# Patient Record
Sex: FEMALE | Race: WHITE | URBAN - METROPOLITAN AREA
[De-identification: names, ages, dates, MRNs, and addresses within clinical notes are randomized per-mention and may not be internally consistent; named-entity substitution may affect disease eponyms.]

---

## 2017-01-30 ENCOUNTER — OFFICE VISIT (OUTPATIENT)
Dept: NEUROLOGY | Facility: CLINIC | Age: 57
End: 2017-01-30
Payer: COMMERCIAL

## 2017-01-30 VITALS
BODY MASS INDEX: 22.65 KG/M2 | HEART RATE: 66 BPM | TEMPERATURE: 97.6 F | DIASTOLIC BLOOD PRESSURE: 89 MMHG | SYSTOLIC BLOOD PRESSURE: 132 MMHG | WEIGHT: 119.8 LBS

## 2017-01-30 DIAGNOSIS — F03.90 DEMENTIA WITHOUT BEHAVIORAL DISTURBANCE, UNSPECIFIED DEMENTIA TYPE: Primary | ICD-10-CM

## 2017-01-30 DIAGNOSIS — F03.90 RAPIDLY PROGRESSIVE DEMENTIA (H): ICD-10-CM

## 2017-01-30 LAB
ANION GAP SERPL CALCULATED.3IONS-SCNC: 7 MMOL/L (ref 3–14)
BUN SERPL-MCNC: 16 MG/DL (ref 7–30)
CALCIUM SERPL-MCNC: 9.7 MG/DL (ref 8.5–10.1)
CHLORIDE SERPL-SCNC: 104 MMOL/L (ref 94–109)
CO2 SERPL-SCNC: 30 MMOL/L (ref 20–32)
CREAT SERPL-MCNC: 0.76 MG/DL (ref 0.52–1.04)
GFR SERPL CREATININE-BSD FRML MDRD: 79 ML/MIN/1.7M2
GLUCOSE SERPL-MCNC: 107 MG/DL (ref 70–99)
POTASSIUM SERPL-SCNC: 4.1 MMOL/L (ref 3.4–5.3)
SODIUM SERPL-SCNC: 141 MMOL/L (ref 133–144)
T3FREE SERPL-MCNC: 2.6 PG/ML (ref 2.3–4.2)
T4 FREE SERPL-MCNC: 0.93 NG/DL (ref 0.76–1.46)
TSH SERPL DL<=0.05 MIU/L-ACNC: 1.13 MU/L (ref 0.4–4)

## 2017-01-30 PROCEDURE — 84443 ASSAY THYROID STIM HORMONE: CPT | Performed by: PSYCHIATRY & NEUROLOGY

## 2017-01-30 PROCEDURE — 80048 BASIC METABOLIC PNL TOTAL CA: CPT | Performed by: PSYCHIATRY & NEUROLOGY

## 2017-01-30 PROCEDURE — 84439 ASSAY OF FREE THYROXINE: CPT | Performed by: PSYCHIATRY & NEUROLOGY

## 2017-01-30 PROCEDURE — 84481 FREE ASSAY (FT-3): CPT | Performed by: PSYCHIATRY & NEUROLOGY

## 2017-01-30 PROCEDURE — 99215 OFFICE O/P EST HI 40 MIN: CPT | Performed by: PSYCHIATRY & NEUROLOGY

## 2017-01-30 PROCEDURE — 36415 COLL VENOUS BLD VENIPUNCTURE: CPT | Performed by: PSYCHIATRY & NEUROLOGY

## 2017-01-30 RX ORDER — DONEPEZIL HYDROCHLORIDE 10 MG/1
10 TABLET, FILM COATED ORAL AT BEDTIME
Qty: 30 TABLET | Refills: 11 | Status: SHIPPED | OUTPATIENT
Start: 2017-01-30 | End: 2017-07-06

## 2017-01-30 RX ORDER — MEMANTINE HYDROCHLORIDE 5 MG/1
TABLET ORAL
Qty: 120 TABLET | Refills: 11 | Status: ON HOLD | OUTPATIENT
Start: 2017-01-30 | End: 2017-07-18

## 2017-01-30 NOTE — MR AVS SNAPSHOT
"              After Visit Summary   1/30/2017    Judith Sifuentes    MRN: [id number]           Patient Information     Date Of Birth          1960        Visit Information        Provider Department      1/30/2017 9:30 AM Esme Russell MD [facility]        Today's Diagnoses     Dementia without behavioral disturbance, unspecified dementia type    -  1     Rapidly progressive dementia           Care Instructions    Plan:    Continue the Aricept (donepezil) 10mg at bedtime.  Increase the Namenda to 5mg in the morning and 10mg at bedtime for one week, then 10mg twice daily.   Labs today. We will notify you of results.   Keep up the good work with staying active!  Return to clinic in 3 months. We will discuss the shaking/twitching further then, if it continues.        Follow-ups after your visit        Follow-up notes from your care team     Return in about 3 months (around 4/30/2017).      Who to contact     If you have questions or need follow up information about today's clinic visit or your schedule please contact [facility] directly at [phone].  Normal or non-critical lab and imaging results will be communicated to you by [employer], letter or phone within 4 business days after the clinic has received the results. If you do not hear from us within 7 days, please contact the clinic through [employer] or phone. If you have a critical or abnormal lab result, we will notify you by phone as soon as possible.  Submit refill requests through [employer] or call your pharmacy and they will forward the refill request to us. Please allow 3 business days for your refill to be completed.          Additional Information About Your Visit        MyChart Information     [employer]ola lets you send messages to your doctor, view your test results, renew your prescriptions, schedule appointments and more. To sign up, go to [email or web address] . Click on \"Log in\" on the left side of the screen, " "which will take you to the Welcome page. Then click on \"Sign up Now\" on the right side of the page.     You will be asked to enter the access code listed below, as well as some personal information. Please follow the directions to create your username and password.     Your access code is: [email or web address]  Expires: 3/27/2017  7:43 AM     Your access code will  in 90 days. If you need help or a new code, please call your [facility] or [phone].        Care EveryWhere ID     This is your Care EveryWhere ID. This could be used by other organizations to access your [place] medical records  [id number]        Your Vitals Were     Pulse Temperature Last Period             66 97.6  F (36.4  C) (Oral) 2013          Blood Pressure from Last 3 Encounters:   17 132/89   16 132/75   16 122/69    Weight from Last 3 Encounters:   17 119 lb 12.8 oz (54.341 kg)   16 117 lb (53.071 kg)   16 113 lb (51.256 kg)              We Performed the Following     Basic metabolic panel     T3, Free     T4, free     TSH          Today's Medication Changes          These changes are accurate as of: 17 10:07 AM.  If you have any questions, ask your nurse or doctor.               These medicines have changed or have updated prescriptions.        Dose/Directions    memantine 5 MG tablet   Commonly known as:  NAMENDA   This may have changed:  additional instructions   Used for:  Dementia without behavioral disturbance, unspecified dementia type   Changed by:  Esme Russell MD        Take 1 tab in the morning and 2 tabs (10mg) at night for one week and then increase to 2 tabs (10mg ) twice daily.   Quantity:  120 tablet   Refills:  11            Where to get your medicines      [facility] [place]  [address] [address]  [address] [facility] [address]     Phone:  [phone]    - donepezil 10 MG tablet  - memantine 5 MG tablet       "       Primary Care Provider Office Phone # Fax #    Erika Davey [phone]       [facility] [address] [address]        Thank you!     Thank you for choosing [facility]  for your care. Our goal is always to provide you with excellent care. Hearing back from our patients is one way we can continue to improve our services. Please take a few minutes to complete the written survey that you may receive in the mail after your visit with us. Thank you!             Your Updated Medication List - Protect others around you: Learn how to safely use, store and throw away your medicines at www.disposemymeds.org.          This list is accurate as of: 1/30/17 10:07 AM.  Always use your most recent med list.                   Brand Name Dispense Instructions for use    donepezil 10 MG tablet    ARICEPT    30 tablet    Take 1 tablet (10 mg) by mouth At Bedtime       memantine 5 MG tablet    NAMENDA    120 tablet    Take 1 tab in the morning and 2 tabs (10mg) at night for one week and then increase to 2 tabs (10mg ) twice daily.

## 2017-01-30 NOTE — PATIENT INSTRUCTIONS
Plan:    Continue the Aricept (donepezil) 10mg at bedtime.  Increase the Namenda to 5mg in the morning and 10mg at bedtime for one week, then 10mg twice daily.   Labs today. We will notify you of results.   Keep up the good work with staying active!  Referral to care coordinator.  Return to clinic in 3 months. We will discuss the shaking/twitching further then, if it continues.

## 2017-01-30 NOTE — NURSING NOTE
"Chief Complaint   Patient presents with     RECHECK     Cognitive decline.       Initial /89 mmHg  Pulse 66  Temp(Src) 97.6  F (36.4  C) (Oral)  Wt 119 lb 12.8 oz (54.341 kg)  LMP 09/18/2013 Estimated body mass index is 22.65 kg/(m^2) as calculated from the following:    Height as of 12/27/16: 5' 1\" (1.549 m).    Weight as of this encounter: 119 lb 12.8 oz (54.341 kg).  BP completed using cuff size: regular    Patient prefers to be contacted: [phone] and letter.  Okay to leave detailed message on voicemail: yes    Madeleine Pressley NR-CMA      "

## 2017-01-30 NOTE — PROGRESS NOTES
ESTABLISHED PATIENT NEUROLOGY NOTE    DATE OF VISIT: 1/30/2017  MRN: [id number]  PATIENT NAME: Judith Sifuentes  YOB: 1960    Chief Complaint   Patient presents with     RECHECK     Cognitive decline.     SUBJECTIVE:                                                      HISTORY OF PRESENT ILLNESS:  Judith is here for follow up regarding cognitive decline. The patient is accompanied by her  in clinic today. He is her primary caregiver. The only new concerns they express today is occasional twitching (vs tremor?) in the hands. This comes and goes without warning and can sometimes get int he way of fine motor tasks such as eating. The patient's daughter helps to care for her mother and her son will also be moving back into the house for additional support. Her  says that she takes care of her own ADLs without help. There are not currently any safety concerns. She does occasionally get frustrated, but otherwise no behavioral changes to report. She likes to go shopping with her friend and also continues to be social with [employer] functions. No change in her mobility. She is tolerating the Aricept and Namenda, denies side effects. The patient feels that maybe her memory is a little better on the medications. They do endorse good days and bad days. She has occasional bouts of confusion. No safety concerns at home. The patient's  says he has been doing some future planning in terms of welfare options, but he has not been in touch with a /care coordinator.     Regarding follow-up for the abnormal thyroid antibody testing, the patient's appointment with endocrinology was apparently cancelled and then not rescheduled. The patient's primary care provider felt that the results were a false positive. No further testing has been completed. I am reminded that the patient had abnormal CSF studies which we have not been able to explain. The family opted not to have the studies  redone.     CURRENT MEDICATIONS:     No current outpatient prescriptions on file prior to visit.  No current facility-administered medications on file prior to visit.    RECENT DIAGNOSTIC STUDIES:   Labs:   Results for orders placed or performed in visit on 07/14/16   T4, free   Result Value Ref Range    T4 Free 0.85 0.76 - 1.46 ng/dL   TSH   Result Value Ref Range    TSH 1.50 0.40 - 4.00 mU/L   T3, Free   Result Value Ref Range    Free T3 2.2 (L) 2.3 - 4.2 pg/mL       REVIEW OF SYSTEMS:                                                      10-point review of systems is negative except as mentioned above in HPI.     EXAM:                                                      Physical Exam:   Vitals: /89 mmHg  Pulse 66  Temp(Src) 97.6  F (36.4  C) (Oral)  Wt 119 lb 12.8 oz (54.341 kg)  LMP 09/18/2013  BMI= Body mass index is 22.65 kg/(m^2).  GENERAL: NAD. Appears older than stated age.  Neurologic:  MENTAL STATUS: Alert, attentive. Speech is fluent. Poor comprehension. Requires a lot of cues.  MoCA: 6/30 (normal is 26 and above. Prior score 10/30).   CRANIAL NERVES: Pupils equally, round and reactive to light. Facial movement normal. EOM full. Hearing to conversation. Trapezius strength intact. Palate moves symmetrically. Tongue midline.  MOTOR: 5/5 in proximal and distal muscle groups of upper and lower extremities. Tone and bulk normal. No abnormal movements observed.  SENSATION: Normal light touch throughout.  COORDINATION: Normal finger nose finger. Normal hand opening/closing speed and amplitude.  STATION AND GAIT: Posture is stooped. Romberg negative. Gait is cautious, but otherwise appears normal.  CV: RRR. S1, S2.       ASSESSMENT and PLAN:                                                      Assessment and Plan:    ICD-10-CM    1. Dementia without behavioral disturbance, unspecified dementia type F03.90 memantine (NAMENDA) 5 MG tablet     Basic metabolic panel     TSH     T4, free     T3, Free   2.  Rapidly progressive dementia F03.90 donepezil (ARICEPT) 10 MG tablet       Ms. Denys Sifuentes is a pleasant 55 yo woman with somewhat rapid cognitive decline (over 3 years time). We have not yet found a good explanation for the speed of her decline, though it is possible that she has a rapidly progressive form of early-onset Alzheimer's. Subjectively, the patient and her  feel that she is stable. She feels that the memory is slightly better on the Aricept and Namenda. Objectively, she continues to decline clinically. We discussed her current needs and future planning. The patient's  is the primary caregiver, and he is interested in community resources. I will place a care coordination referral to get things started. We also discussed increasing the Namenda to full dose of 10mg BID since Judith is tolerating the medication well. I would like to recheck some labs today too, with the new history of twitching/shaking. The patient's  understands and agrees with the plan.     Patient Instructions:  Continue the Aricept (donepezil) 10mg at bedtime.  Increase the Namenda to 5mg in the morning and 10mg at bedtime for one week, then 10mg twice daily.   Labs today. We will notify you of results.   Keep up the good work with staying active!  Referral to care coordinator.  Return to clinic in 3 months. We will discuss the shaking/twitching further then, if it continues.      Total Time: 40 minutes were spent with the patient. More than 50% of the time spent on counseling (as described above in Assessment and Plan) /coordinating the care.    Esme Russell MD  Neurology

## 2017-01-31 NOTE — PROGRESS NOTES
Quick Note:    Please advise Judith Sifuentes,  1960, that her lab results were unremarkable. Thyroid function appears normal.   [phone] (home) None (work)    Thank you,  Esme Russell    ______

## 2017-02-02 ENCOUNTER — CARE COORDINATION (OUTPATIENT)
Dept: CARE COORDINATION | Facility: CLINIC | Age: 57
End: 2017-02-02

## 2017-02-02 NOTE — LETTER
[facility]  [address] [place] [address]      March 6, 2017      Judith Mcfarlane Yuvalnba  [address]  [facility] [address]    Dear Judith,  I am the Clinic Care Coordinator that works with your primary care provider's clinic. I recently tried to call and was unable to reach you. Below is a description of what Clinic Care Coordination is and how I can further assist you.     The Clinic Care Coordinator role is a Registered Nurse and/or  who understands the health care system. The goal of Clinic Care Coordination is to help you manage your health and improve access to the [facility] system in the most efficient manner.  The Registered Nurse can assist you in meeting your health care goals by providing education, coordinating services, and strengthening the communication among your providers. The  can assist you with financial, behavioral, psychosocial, and chemical dependency and counseling/psychiatric resources.    Please feel free to keep this letter and contact information to contact me at [phone] with any further questions or concerns that may arise. We at [facility] are focused on providing you with the highest-quality healthcare experience possible and that all starts with you.       Sincerely,     Genesis Melvin    Enclosed: I have enclosed a copy of a 24 Hour Access Plan. This has helpful phone numbers for you to call when needed. Please keep this in an easy to access place to use as needed.

## 2017-02-02 NOTE — LETTER
Health Care Home - Access Care Plan    About Me  Patient Name:  Judith Sifuentes    YOB: 1960  Age:                            56 year old   Roberts MRN:         [id number] Telephone Information:     Home Phone [phone]   Mobile [phone]       Address:    [address]  [facility] [address] Email address:  No e-mail address on record      Emergency Contact(s)  Name Relationship Lgl Grd Work Phone Home Phone Mobile Phone   1. LIDIA SIFUENTES Spouse  NONE [phone]   2. JOSE CORDOVA Father  NONE [phone] NONE   3. MINDY VALENTIN Friend  NONE [phone] NONE             Health Maintenance:      My Access Plan  Medical Emergency 911   Questions or concerns during clinic hours Primary Clinic Line, I will call the clinic directly: Primary Clinic: [facility]- [phone]   24 Hour Appointment Line [phone] or  [phone])  (toll free)   24 Hour Nurse Line [phone] (toll free)   Questions or concerns outside clinic hours 24 Hour Appointment Line, I will call the after-hours on-call line:   [facility] [phone] or [phone] ([phone]) (toll-free)   Preferred Urgent Care Preferred Urgent Care: [facility], [phone]   Preferred Hospital Preferred Hospital: [place], [place]  [phone]   Preferred Pharmacy [facility] [address] Behavioral Health Crisis Line [facility], [phone] or 911     My Care Team Members  Patient Care Team       Relationship Specialty Notifications Start End    Erika Davey DO PCP - General Internal Medicine  2/1/16     Phone: [phone] Pager: [phone] Fax: [phone]        [facility] [address] [facility] [address]    Genesis Melvin   Admissions 3/1/16     Comment:  [phone]    Phone: [phone] Fax: [phone]        Drew  MD Esme Referring Physician Neurosurgery  3/16/16     Comment:  referring to neuropsych    Phone: [phone] Fax: [phone]         [address] [facility] [address]    Judith Lewis,    Psychology  3/16/16     Phone: [phone] Fax: [phone]         [address] [facility] [address]        My Medical and Care Information  Problem List   Patient Active Problem List   Diagnosis     Prediabetes     Hyperlipidemia LDL goal <130     Hair loss     Memory change     Advanced directives, counseling/discussion     Spinal puncture headache     Dementia without behavioral disturbance, unspecified dementia type      Current Medications and Allergies:  See printed Medication Report

## 2017-02-02 NOTE — PROGRESS NOTES
Clinic Care Coordination Contact  [facility]/Voicemail    Referral Source: PCP  Clinical Data: Care Coordinator Outreach  Outreach attempted x 1.  Left message on voicemail with call back information and requested return call.  Plan: Care Coordinator will mail out care coordination introduction letter with care coordinator contact information and explanation of care coordination services.  SW previously closed pt to care coordination in November 2016 per patient/family request.  Care Coordinator will try to reach patient again in 3-5 business days.    Genesis Gibbons  Social Work Care Coordinator  [facility]  [phone]

## 2017-02-27 ENCOUNTER — TELEPHONE (OUTPATIENT)
Dept: FAMILY MEDICINE | Facility: CLINIC | Age: 57
End: 2017-02-27

## 2017-02-28 NOTE — TELEPHONE ENCOUNTER
Your last mammogram was 5/12/14 at [facility]. You are due for a screening Mammogram.  Please contact the Diagnostics Registration Department at: [phone] to schedule this appointment.     Specialty Comments have been updated:    Panel Management: Telephone Encounter: Breast Cancer 2/20/17    Marium Harley CMA

## 2017-03-01 NOTE — TELEPHONE ENCOUNTER
Dr. Davey, patient is due for pappe,mammo,colon cancer screening. Per her problem list she has dementia. Is this something we can defer, or send a letter? .Thank You . Fernando AVENDANO CMA

## 2017-03-06 NOTE — PROGRESS NOTES
Clinic Care Coordination Contact  [facility]/Voicemail    Referral Source: PCP  Clinical Data: Care Coordinator Outreach  Outreach attempted x 2.  Left message on voicemail with call back information and requested return call.  Plan: Care Coordinator mailed out care coordination introduction letter on 12/19/16 & 3/6/17. Care Coordinator will do no further outreaches at this time.      Genesis Gibbons  Social Work Care Coordinator  [facility]  [phone]

## 2017-03-09 ENCOUNTER — CARE COORDINATION (OUTPATIENT)
Dept: CARE COORDINATION | Facility: CLINIC | Age: 57
End: 2017-03-09

## 2017-03-09 NOTE — PROGRESS NOTES
Clinic Care Coordination Contact  [facility]/Voicemail       Clinical Data: Care Coordinator Outreach  Outreach attempted x 2.  Left message on voicemail with call back information and requested return call.  Plan: Care Coordinator mailed out care coordination introduction letter on 3-6-17. Care Coordinator will try to reach patient again in 5-15 business days.    Genesis Pagan  Social Work Care Coordinator  [facility]  [phone]

## 2017-03-10 ENCOUNTER — OFFICE VISIT (OUTPATIENT)
Dept: FAMILY MEDICINE | Facility: CLINIC | Age: 57
End: 2017-03-10
Payer: COMMERCIAL

## 2017-03-10 VITALS
HEIGHT: 61 IN | BODY MASS INDEX: 21.52 KG/M2 | SYSTOLIC BLOOD PRESSURE: 121 MMHG | HEART RATE: 61 BPM | WEIGHT: 114 LBS | TEMPERATURE: 96.7 F | DIASTOLIC BLOOD PRESSURE: 68 MMHG

## 2017-03-10 DIAGNOSIS — Z71.89 ADVANCED DIRECTIVES, COUNSELING/DISCUSSION: ICD-10-CM

## 2017-03-10 DIAGNOSIS — R14.3 FLATULENCE, ERUCTATION AND GAS PAIN: Primary | ICD-10-CM

## 2017-03-10 DIAGNOSIS — R14.2 FLATULENCE, ERUCTATION AND GAS PAIN: Primary | ICD-10-CM

## 2017-03-10 DIAGNOSIS — F03.90 DEMENTIA WITHOUT BEHAVIORAL DISTURBANCE, UNSPECIFIED DEMENTIA TYPE: ICD-10-CM

## 2017-03-10 DIAGNOSIS — K30 INDIGESTION: ICD-10-CM

## 2017-03-10 DIAGNOSIS — R14.1 FLATULENCE, ERUCTATION AND GAS PAIN: Primary | ICD-10-CM

## 2017-03-10 PROCEDURE — 99214 OFFICE O/P EST MOD 30 MIN: CPT | Performed by: INTERNAL MEDICINE

## 2017-03-10 RX ORDER — OMEPRAZOLE 40 MG/1
40 CAPSULE, DELAYED RELEASE ORAL DAILY
Qty: 30 CAPSULE | Refills: 0 | Status: SHIPPED | OUTPATIENT
Start: 2017-03-10 | End: 2017-04-27

## 2017-03-10 NOTE — NURSING NOTE
"Chief Complaint   Patient presents with     Gastrointestinal Problem     burping all the time last 2 weeks. Still eating fine.        Initial /68 (BP Location: Right arm, Patient Position: Chair, Cuff Size: Adult Regular)  Pulse 61  Temp 96.7  F (35.9  C) (Tympanic)  Ht 5' 1\" (1.549 m)  Wt 114 lb (51.7 kg)  LMP 09/18/2013  BMI 21.54 kg/m2 Estimated body mass index is 21.54 kg/(m^2) as calculated from the following:    Height as of this encounter: 5' 1\" (1.549 m).    Weight as of this encounter: 114 lb (51.7 kg).  Medication Reconciliation: complete  "

## 2017-03-10 NOTE — PATIENT INSTRUCTIONS
Thank you for choosing [facility].  You may be receiving a survey in the mail from [employer] regarding your visit today.  Please take a few minutes to complete and return the survey to let us know how we are doing.      If you have questions or concerns, please contact us via [employer] or you can contact your care team at [phone].    Our Clinic hours are:  Monday 6:40 am  to 7:00 pm  Tuesday -Friday 6:40 am to 5:00 pm    The [place] outpatient lab hours are:  Monday - Friday 6:10 am to 4:45 pm  Saturdays 7:00 am to 11:00 am  Appointments are required, call [phone]    If you have clinical questions after hours or would like to schedule an appointment,  call the clinic at [phone].      [facility] [place] [place] - [address] [place] [address]      1. Start omeprazole 40 mg once daily x 1 month.  2. If burping continues, can try Gas-X or Beano or Mylanta.    3. See Dr. Davey in 1-2 months is symptoms continue.    Please mail copy of your Advance Directive (living will)

## 2017-03-10 NOTE — PROGRESS NOTES
SUBJECTIVE:                                                    Judith Sifuentes is a 56 year old female who presents to clinic today for the following health issues:      Chief Complaint   Patient presents with     Gastrointestinal Problem     burping all the time last 2 weeks. Still eating fine.    GI symptoms:  Feels abdominal distension.  Feels excessive gas that she needs to frequently belch to resolve.  She does report passing gas, not changed.  Is having daily or QOD bowel movement.  No nausea.  No diarrhea.  Unknown if blood in the stool.  Denies abdominal pain.  No fevers.  Appetite is normal.  Denies heartburn.  No diet changes.  No history of previous belching issues. No carbonated beverage use. No dysphagia or odynophagia.  Has not tried any OTC meds except took a few tums.     aricept 3-19% report nausea  namenda  - Diarrhea (5%), constipation (3% to 5%), vomiting (2% to 3%), abdominal pain (2%)        Advance Directive: from note 5/2016 -  is her health care agent, Ramy 2nd oldest child is legal POA.  reports she would not want to live on a machine. He does not know if there is a formal code status order in place.  We dont' have AD on file.   reports if there is no chance of meaningful recovery, they would not want life sustaining treatment.  They do want patient to remain full code for now.     HCM:  Patient declines any further cancer screening due to dementia.   agrees.  Current Outpatient Prescriptions   Medication Sig Dispense Refill     memantine (NAMENDA) 5 MG tablet Take 1 tab in the morning and 2 tabs (10mg) at night for one week and then increase to 2 tabs (10mg ) twice daily. 120 tablet 11     donepezil (ARICEPT) 10 MG tablet Take 1 tablet (10 mg) by mouth At Bedtime 30 tablet 11       Reviewed and updated as needed this visit by clinical staff  Allergies       Reviewed and updated as needed this visit by Provider         ROS:  Constitutional, HEENT,  "cardiovascular, pulmonary, gi and gu systems are negative, except as otherwise noted.    OBJECTIVE:                                                    /68 (BP Location: Right arm, Patient Position: Chair, Cuff Size: Adult Regular)  Pulse 61  Temp 96.7  F (35.9  C) (Tympanic)  Ht 5' 1\" (1.549 m)  Wt 114 lb (51.7 kg)  LMP 09/18/2013  BMI 21.54 kg/m2  Body mass index is 21.54 kg/(m^2).  GENERAL APPEARANCE: alert and no distress  RESP: lungs clear to auscultation - no rales, rhonchi or wheezes  CV: regular rates and rhythm, normal S1 S2, no S3 or S4 and no murmur, click or rub  ABDOMEN: soft, nontender, without hepatosplenomegaly or masses and bowel sounds normal  NEURO: difficulty following simple directions, very poor memory.  Word finding difficulties.  All baseline findings       ASSESSMENT/PLAN:                                                       1. Flatulence, eructation and gas pain - likely GERD or indgestion.  Also considered aerophagia due to advanced dementia.  1. Start omeprazole 40 mg once daily x 1 month.  2. If burping continues, can try Gas-X or Beano or Mylanta.    3. See Dr. Davey in 1-2 months is symptoms continue.   - omeprazole (PRILOSEC) 40 MG capsule; Take 1 capsule (40 mg) by mouth daily Take 30-60 minutes before a meal.  Dispense: 30 capsule; Refill: 0    2. Indigestion  - omeprazole (PRILOSEC) 40 MG capsule; Take 1 capsule (40 mg) by mouth daily Take 30-60 minutes before a meal.  Dispense: 30 capsule; Refill: 0    3. Dementia without behavioral disturbance, unspecified dementia type - looked up side effects of her 2 meds, burping does not appear to be one  --she does not want any further cancer screenings which I feel is reasonable given her advanced dementia.    4. Advanced directives, counseling/discussion -= gave  POLST and discussed.  He wants to take home and discuss with family, will return to be signed      Erika Davey, DO  [facility]  "

## 2017-03-10 NOTE — MR AVS SNAPSHOT
After Visit Summary   3/10/2017    Judith Sifuentes    MRN: [id number]           Patient Information     Date Of Birth          1960        Visit Information        Provider Department      3/10/2017 2:40 PM Erika Davey, DO [facility]        Today's Diagnoses     Dementia without behavioral disturbance, unspecified dementia type    -  1    Flatulence, eructation and gas pain        Indigestion          Care Instructions          Thank you for choosing [facility].  You may be receiving a survey in the mail from [employer] regarding your visit today.  Please take a few minutes to complete and return the survey to let us know how we are doing.      If you have questions or concerns, please contact us via [employer] or you can contact your care team at [phone].    Our Clinic hours are:  Monday 6:40 am  to 7:00 pm  Tuesday -Friday 6:40 am to 5:00 pm    The [place] outpatient lab hours are:  Monday - Friday 6:10 am to 4:45 pm  Saturdays 7:00 am to 11:00 am  Appointments are required, call [phone]    If you have clinical questions after hours or would like to schedule an appointment,  call the clinic at [phone].      [facility] [place], [place] - [id number] [place] [address]      1. Start omeprazole 40 mg once daily x 1 month.  2. If burping continues, can try Gas-X or Beano or Mylanta.    3. See Dr. Davey in 1-2 months is symptoms continue.    Please mail copy of your Advance Directive (living will)        Follow-ups after your visit        Your next 10 appointments already scheduled     Apr 27, 2017  9:30 AM CDT   Return Visit with Esme Russell MD   [facility] ([facility])    [address] [address] [phone]              Who to contact     If you have questions or need follow up information about today's clinic visit or your schedule please contact [facility] directly at  "[phone].  Normal or non-critical lab and imaging results will be communicated to you by MyChart, letter or phone within 4 business days after the clinic has received the results. If you do not hear from us within 7 days, please contact the clinic through [employer] or phone. If you have a critical or abnormal lab result, we will notify you by phone as soon as possible.  Submit refill requests through [employer] or call your pharmacy and they will forward the refill request to us. Please allow 3 business days for your refill to be completed.          Additional Information About Your Visit        [employer] Information     [employer] lets you send messages to your doctor, view your test results, renew your prescriptions, schedule appointments and more. To sign up, go to [email or web address] . Click on \"Log in\" on the left side of the screen, which will take you to the Welcome page. Then click on \"Sign up Now\" on the right side of the page.     You will be asked to enter the access code listed below, as well as some personal information. Please follow the directions to create your username and password.     Your access code is: [email or web address]  Expires: 3/27/2017  7:43 AM     Your access code will  in 90 days. If you need help or a new code, please call your [facility] clinic or [phone].        Care EveryWhere ID     This is your Care EveryWhere ID. This could be used by other organizations to access your [facility] medical records  [id number]        Your Vitals Were     Pulse Temperature Height Last Period BMI (Body Mass Index)       61 96.7  F (35.9  C) (Tympanic) 5' 1\" (1.549 m) 2013 21.54 kg/m2        Blood Pressure from Last 3 Encounters:   03/10/17 121/68   17 132/89   16 132/75    Weight from Last 3 Encounters:   03/10/17 114 lb (51.7 kg)   17 119 lb 12.8 oz (54.3 kg)   16 117 lb (53.1 kg)              Today, you had the following     No orders found for display         Today's " Medication Changes          These changes are accurate as of: 3/10/17  3:00 PM.  If you have any questions, ask your nurse or doctor.               Start taking these medicines.        Dose/Directions    omeprazole 40 MG capsule   Commonly known as:  priLOSEC   Used for:  Flatulence, eructation and gas pain, Indigestion   Started by:  Erika Davey DO        Dose:  40 mg   Take 1 capsule (40 mg) by mouth daily Take 30-60 minutes before a meal.   Quantity:  30 capsule   Refills:  0            Where to get your medicines      These medications were sent to [facility] [address] [place] [address]     Phone:  [phone]     omeprazole 40 MG capsule                Primary Care Provider Office Phone # Fax #    Erika Davey [phone]       [facility] [address] [address]        Thank you!     Thank you for choosing [facility]  for your care. Our goal is always to provide you with excellent care. Hearing back from our patients is one way we can continue to improve our services. Please take a few minutes to complete the written survey that you may receive in the mail after your visit with us. Thank you!             Your Updated Medication List - Protect others around you: Learn how to safely use, store and throw away your medicines at www.disposemymeds.org.          This list is accurate as of: 3/10/17  3:00 PM.  Always use your most recent med list.                   Brand Name Dispense Instructions for use    donepezil 10 MG tablet    ARICEPT    30 tablet    Take 1 tablet (10 mg) by mouth At Bedtime       memantine 5 MG tablet    NAMENDA    120 tablet    Take 1 tab in the morning and 2 tabs (10mg) at night for one week and then increase to 2 tabs (10mg ) twice daily.       omeprazole 40 MG capsule    priLOSEC    30 capsule    Take 1 capsule (40 mg) by mouth daily Take 30-60 minutes  before a meal.

## 2017-03-27 ENCOUNTER — TELEPHONE (OUTPATIENT)
Dept: FAMILY MEDICINE | Facility: CLINIC | Age: 57
End: 2017-03-27

## 2017-03-27 NOTE — TELEPHONE ENCOUNTER
Letter along with POLST from pt's . Arlene, pt's friend was called and will get a hold of Tavon and have him call in to make an appt to fill out paperwork.    Put in Lady's inbox.    [facility]

## 2017-04-15 NOTE — TELEPHONE ENCOUNTER
Sent patient a letter indicating appt is needed in order to complete.  Will continue to watch chart for appt.

## 2017-04-18 ENCOUNTER — CARE COORDINATION (OUTPATIENT)
Dept: CARE COORDINATION | Facility: CLINIC | Age: 57
End: 2017-04-18

## 2017-04-18 NOTE — PROGRESS NOTES
Clinic Care Coordination Contact  [facility]/Voicemail    Referral Source: PCP  Clinical Data: Care Coordinator Outreach  Outreach attempted x 2.  Left message on voicemail with call back information and requested return call.  Plan: Care Coordinator mailed out care coordination introduction letter on 3-6-17. Care Coordinator will do no further outreaches at this time.    Genesis Kauffman [facility]  Social Work Care Coordinator  [facility]  [phone]

## 2017-04-27 ENCOUNTER — OFFICE VISIT (OUTPATIENT)
Dept: NEUROLOGY | Facility: CLINIC | Age: 57
End: 2017-04-27
Payer: COMMERCIAL

## 2017-04-27 VITALS
SYSTOLIC BLOOD PRESSURE: 111 MMHG | DIASTOLIC BLOOD PRESSURE: 64 MMHG | HEART RATE: 70 BPM | BODY MASS INDEX: 20.78 KG/M2 | WEIGHT: 110 LBS | TEMPERATURE: 98.1 F

## 2017-04-27 DIAGNOSIS — F03.90 RAPIDLY PROGRESSIVE DEMENTIA (H): Primary | ICD-10-CM

## 2017-04-27 PROCEDURE — 99215 OFFICE O/P EST HI 40 MIN: CPT | Performed by: PSYCHIATRY & NEUROLOGY

## 2017-04-27 ASSESSMENT — MONTREAL COGNITIVE ASSESSMENT (MOCA)
6. READ LIST OF DIGITS [FORWARD/BACKWARD]: 1
WHAT LEVEL OF EDUCATION WAS ATTAINED: 1
9. REPEAT EACH SENTENCE: 0
VISUOSPATIAL/EXECUTIVE SUBSCORE: 0
7. [VIGILENCE] TAP WHEN HEARING DESIGNATED LETTER: 0
10. [FLUENCY] NAME WORDS STARTING WITH DESIGNATED LETTER: 0
12. MEMORY INDEX SCORE: 0
8. SERIAL SUBTRACTION OF 7S: 0
11. FOR EACH PAIR OF WORDS, WHAT CATEGORY DO THEY BELONG TO (OUT OF 2): 0
4. NAME EACH OF THE THREE ANIMALS SHOWN: 0
13. ORIENTATION SUBSCORE: 1
WHAT IS THE TOTAL SCORE (OUT OF 30): 3

## 2017-04-27 NOTE — NURSING NOTE
"Chief Complaint   Patient presents with     RECHECK     Cognition.         Initial /64 (BP Location: Right arm, Patient Position: Chair, Cuff Size: Adult Regular)  Pulse 70  Temp 98.1  F (36.7  C) (Oral)  Wt 110 lb (49.9 kg)  LMP 09/18/2013  BMI 20.78 kg/m2 Estimated body mass index is 20.78 kg/(m^2) as calculated from the following:    Height as of 3/10/17: 5' 1\" (1.549 m).    Weight as of this encounter: 110 lb (49.9 kg).  Medication Reconciliation: complete    Patient prefers to be contacted: [phone]  Okay to leave detailed message on voicemail: yes    Madeleine BETANCUR-CMA    "

## 2017-04-27 NOTE — PROGRESS NOTES
ESTABLISHED PATIENT NEUROLOGY NOTE    DATE OF VISIT: 4/27/2017  MRN: [id number]  PATIENT NAME: Judith Sifuentes  YOB: 1960    Chief Complaint   Patient presents with     RECHECK     Cognition.       SUBJECTIVE:                                                      HISTORY OF PRESENT ILLNESS:  Judith is here for follow up regarding cognitive impairment. The patient has a rapidly-progressive dementia, likely early onset Alzheimer's. She is accompanied by her  and daughter in clinic today. They provide the majority of her care. The family is in contact with our Care coordinators as well.  brings [place] paperwork with him again to clinic today.     They tell me that there have not been any big changes since Judith's last visit. She has good days and bad days, sometimes needing help with simple tasks such as getting dressed. Family provides the help. She is having trouble understanding how to swallow her pills so now sometimes she lets them dissolve in her mouth first. She is on Namenda and Aricept and denies side effects. She denies difficulties swallowing food. No choking. Appetite is good. Sleep is good. Mood is good. No behavioral changes noted by family. The patient sleeps well. No hallucinations. No physical changes in terms of mobility either. The patient denies dizziness, difficulties walking, changes in bladder/bowel function. I note that the  does reach out to help steady her as she takes a step up onto the exam room bed. The family feels that they are doing well at home.     Regarding the twitching discussed at her previous visit, it is now less frequent. The  and the patient herself have not really noticed any twitching lately.     Regarding follow-up for the abnormal thyroid antibody testing, the patient's appointment with endocrinology was apparently cancelled and then not rescheduled. The patient's primary care provider felt that the results were a false  positive. No further testing has been completed. I am reminded that the patient had abnormal CSF studies which we have not been able to explain. The family opted not to have the studies redone. Repeat thyroid function testing was normal.         CURRENT MEDICATIONS:     Current Outpatient Prescriptions on File Prior to Visit:  donepezil (ARICEPT) 10 MG tablet Take 1 tablet (10 mg) by mouth At Bedtime   memantine (NAMENDA) 5 MG tablet Take 1 tab in the morning and 2 tabs (10mg) at night for one week and then increase to 2 tabs (10mg ) twice daily. (Patient taking differently: Take 5 mg by mouth 2 times daily Take 1 tab in the morning and 2 tabs (10mg) at night for one week and then increase to 2 tabs (10mg ) twice daily.)     No current facility-administered medications on file prior to visit.     RECENT DIAGNOSTIC STUDIES:   Labs:   Results for orders placed or performed in visit on 01/30/17   Basic metabolic panel   Result Value Ref Range    Sodium 141 133 - 144 mmol/L    Potassium 4.1 3.4 - 5.3 mmol/L    Chloride 104 94 - 109 mmol/L    Carbon Dioxide 30 20 - 32 mmol/L    Anion Gap 7 3 - 14 mmol/L    Glucose 107 (H) 70 - 99 mg/dL    Urea Nitrogen 16 7 - 30 mg/dL    Creatinine 0.76 0.52 - 1.04 mg/dL    GFR Estimate 79 >60 mL/min/1.7m2    GFR Estimate If Black >90   GFR Calc   >60 mL/min/1.7m2    Calcium 9.7 8.5 - 10.1 mg/dL   TSH   Result Value Ref Range    TSH 1.13 0.40 - 4.00 mU/L   T4, free   Result Value Ref Range    T4 Free 0.93 0.76 - 1.46 ng/dL   T3, Free   Result Value Ref Range    Free T3 2.6 2.3 - 4.2 pg/mL         REVIEW OF SYSTEMS:                                                      10-point review of systems is negative except as mentioned above in HPI.     EXAM:                                                      Physical Exam:   Vitals: /64 (BP Location: Right arm, Patient Position: Chair, Cuff Size: Adult Regular)  Pulse 70  Temp 98.1  F (36.7  C) (Oral)  Wt 110 lb (49.9  kg)  LMP 09/18/2013  BMI 20.78 kg/m2  BMI= Body mass index is 20.78 kg/(m^2).  GENERAL: NAD. Appears older than stated age.  Neurologic:  MENTAL STATUS: Alert, attentive. Speech is fluent. Poor comprehension. Requires a lot of cues. MoCA: 3/30 (normal is 26 and above. Prior score 6/30).   CRANIAL NERVES: Pupils equally, round and reactive to light. Facial movement normal. EOM full. Hearing to conversation. Trapezius strength intact. Palate moves symmetrically. Tongue midline.  MOTOR: 5/5 in proximal and distal muscle groups of upper and lower extremities. Tone and bulk normal. No abnormal movements observed.  SENSATION: Normal light touch throughout.  COORDINATION: Clumsy opening/closing speed and amplitude. Apraxic.  STATION AND GAIT: Gait is normal. Patient runs down the gerber for me.  CV: RRR. S1, S2.   Neck: No bruits.       ASSESSMENT and PLAN:                                                      Assessment and Plan:    ICD-10-CM    1. Rapidly progressive dementia F03.90        MsVilma Sifuentes is a pleasant 57 yo woman with somewhat rapid cognitive decline (over 3 years time). We have not yet found a good explanation for the speed of her decline, though it is possible that she has a rapidly progressive form of early-onset Alzheimer's. Subjectively, the patient and her  feel that she is stable for the most part (good days and bad days). She is tolerating the Aricept and Namenda without difficulty. Objectively, she continues to decline clinically from the cognitive standpoint. We once again discussed her current needs, prognosis and future planning. The family seems to be doing well at home. We will plan to continue the current medications and follow-up in 6 month's time. I asked Judith's family to let me (or the care coordinators) know if they have any concerns in the meantime. They understand and agree with the plan.     Total Time: 40 minutes were spent with the patient. More than 50% of the time  spent on counseling (as described above in A&P) /coordinating the care.    Esme Russell MD  Neurology

## 2017-05-02 ENCOUNTER — TELEPHONE (OUTPATIENT)
Dept: NEUROLOGY | Facility: CLINIC | Age: 57
End: 2017-05-02

## 2017-05-04 NOTE — TELEPHONE ENCOUNTER
Completed forms mailed to Jon Sifuentes's home.  A copy will be scanned into the EMR.  Madeleine GARCIA

## 2017-07-01 ENCOUNTER — HOSPITAL ENCOUNTER (EMERGENCY)
Facility: CLINIC | Age: 57
Discharge: HOME | End: 2017-07-01
Attending: EMERGENCY MEDICINE | Admitting: EMERGENCY MEDICINE
Payer: COMMERCIAL

## 2017-07-01 VITALS
SYSTOLIC BLOOD PRESSURE: 136 MMHG | HEART RATE: 74 BPM | OXYGEN SATURATION: 99 % | TEMPERATURE: 98.2 F | RESPIRATION RATE: 18 BRPM | DIASTOLIC BLOOD PRESSURE: 67 MMHG

## 2017-07-01 DIAGNOSIS — R82.81 PYURIA: ICD-10-CM

## 2017-07-01 DIAGNOSIS — F03.91 DEMENTIA WITH BEHAVIORAL DISTURBANCE, UNSPECIFIED DEMENTIA TYPE: ICD-10-CM

## 2017-07-01 LAB
ALBUMIN SERPL-MCNC: 4 G/DL (ref 3.4–5)
ALBUMIN UR-MCNC: 10 MG/DL
ALP SERPL-CCNC: 110 U/L (ref 40–150)
ALT SERPL W P-5'-P-CCNC: 28 U/L (ref 0–50)
ANION GAP SERPL CALCULATED.3IONS-SCNC: 5 MMOL/L (ref 3–14)
APPEARANCE UR: CLEAR
AST SERPL W P-5'-P-CCNC: 19 U/L (ref 0–45)
BASOPHILS # BLD AUTO: 0 10E9/L (ref 0–0.2)
BASOPHILS NFR BLD AUTO: 0.4 %
BILIRUB SERPL-MCNC: 0.3 MG/DL (ref 0.2–1.3)
BILIRUB UR QL STRIP: NEGATIVE
BUN SERPL-MCNC: 25 MG/DL (ref 7–30)
CALCIUM SERPL-MCNC: 9.2 MG/DL (ref 8.5–10.1)
CHLORIDE SERPL-SCNC: 106 MMOL/L (ref 94–109)
CO2 SERPL-SCNC: 30 MMOL/L (ref 20–32)
COLOR UR AUTO: YELLOW
CREAT SERPL-MCNC: 0.77 MG/DL (ref 0.52–1.04)
DIFFERENTIAL METHOD BLD: NORMAL
EOSINOPHIL # BLD AUTO: 0 10E9/L (ref 0–0.7)
EOSINOPHIL NFR BLD AUTO: 0.4 %
ERYTHROCYTE [DISTWIDTH] IN BLOOD BY AUTOMATED COUNT: 13.4 % (ref 10–15)
GFR SERPL CREATININE-BSD FRML MDRD: 77 ML/MIN/1.7M2
GLUCOSE SERPL-MCNC: 106 MG/DL (ref 70–99)
GLUCOSE UR STRIP-MCNC: NEGATIVE MG/DL
HCT VFR BLD AUTO: 42.7 % (ref 35–47)
HGB BLD-MCNC: 14.1 G/DL (ref 11.7–15.7)
HGB UR QL STRIP: NEGATIVE
IMM GRANULOCYTES # BLD: 0 10E9/L (ref 0–0.4)
IMM GRANULOCYTES NFR BLD: 0.1 %
KETONES UR STRIP-MCNC: NEGATIVE MG/DL
LEUKOCYTE ESTERASE UR QL STRIP: ABNORMAL
LYMPHOCYTES # BLD AUTO: 1.2 10E9/L (ref 0.8–5.3)
LYMPHOCYTES NFR BLD AUTO: 15.5 %
MCH RBC QN AUTO: 31.3 PG (ref 26.5–33)
MCHC RBC AUTO-ENTMCNC: 33 G/DL (ref 31.5–36.5)
MCV RBC AUTO: 95 FL (ref 78–100)
MONOCYTES # BLD AUTO: 0.6 10E9/L (ref 0–1.3)
MONOCYTES NFR BLD AUTO: 7.1 %
MUCOUS THREADS #/AREA URNS LPF: PRESENT /LPF
NEUTROPHILS # BLD AUTO: 6 10E9/L (ref 1.6–8.3)
NEUTROPHILS NFR BLD AUTO: 76.5 %
NITRATE UR QL: NEGATIVE
PH UR STRIP: 5.5 PH (ref 5–7)
PLATELET # BLD AUTO: 206 10E9/L (ref 150–450)
POTASSIUM SERPL-SCNC: 3.7 MMOL/L (ref 3.4–5.3)
PROT SERPL-MCNC: 8.1 G/DL (ref 6.8–8.8)
RBC # BLD AUTO: 4.51 10E12/L (ref 3.8–5.2)
RBC #/AREA URNS AUTO: 2 /HPF (ref 0–2)
SODIUM SERPL-SCNC: 141 MMOL/L (ref 133–144)
SP GR UR STRIP: 1.02 (ref 1–1.03)
URN SPEC COLLECT METH UR: ABNORMAL
UROBILINOGEN UR STRIP-MCNC: NORMAL MG/DL (ref 0–2)
WBC # BLD AUTO: 7.9 10E9/L (ref 4–11)
WBC #/AREA URNS AUTO: 13 /HPF (ref 0–2)

## 2017-07-01 PROCEDURE — 80053 COMPREHEN METABOLIC PANEL: CPT | Performed by: EMERGENCY MEDICINE

## 2017-07-01 PROCEDURE — 85025 COMPLETE CBC W/AUTO DIFF WBC: CPT | Performed by: EMERGENCY MEDICINE

## 2017-07-01 PROCEDURE — 99284 EMERGENCY DEPT VISIT MOD MDM: CPT | Performed by: EMERGENCY MEDICINE

## 2017-07-01 PROCEDURE — 25000132 ZZH RX MED GY IP 250 OP 250 PS 637: Performed by: EMERGENCY MEDICINE

## 2017-07-01 PROCEDURE — 87086 URINE CULTURE/COLONY COUNT: CPT | Performed by: EMERGENCY MEDICINE

## 2017-07-01 PROCEDURE — 81001 URINALYSIS AUTO W/SCOPE: CPT | Performed by: EMERGENCY MEDICINE

## 2017-07-01 PROCEDURE — 99283 EMERGENCY DEPT VISIT LOW MDM: CPT

## 2017-07-01 RX ORDER — LORAZEPAM 0.5 MG/1
0.5 TABLET ORAL ONCE
Status: COMPLETED | OUTPATIENT
Start: 2017-07-01 | End: 2017-07-01

## 2017-07-01 RX ORDER — SULFAMETHOXAZOLE/TRIMETHOPRIM 800-160 MG
1 TABLET ORAL 2 TIMES DAILY
Qty: 6 TABLET | Refills: 0 | Status: SHIPPED | OUTPATIENT
Start: 2017-07-01 | End: 2017-07-04

## 2017-07-01 RX ORDER — LORAZEPAM 1 MG/1
.5-1 TABLET ORAL EVERY 6 HOURS PRN
Qty: 20 TABLET | Refills: 0 | Status: ON HOLD | OUTPATIENT
Start: 2017-07-01 | End: 2017-07-18

## 2017-07-01 RX ADMIN — LORAZEPAM 0.5 MG: 0.5 TABLET ORAL at 15:30

## 2017-07-01 NOTE — ED AVS SNAPSHOT
[facility] Emergency Department    [address] [address]    Phone:  [phone]    Fax:  [phone]                                       Judith Sifuentes   MRN: [id number]    Department:  [facility] Emergency Department   Date of Visit:  7/1/2017           After Visit Summary Signature Page     I have received my discharge instructions, and my questions have been answered. I have discussed any challenges I see with this plan with the nurse or doctor.    ..........................................................................................................................................  Patient/Patient Representative Signature      ..........................................................................................................................................  Patient Representative Print Name and Relationship to Patient    ..................................................               ................................................  Date                                            Time    ..........................................................................................................................................  Reviewed by Signature/Title    ...................................................              ..............................................  Date                                                            Time

## 2017-07-01 NOTE — DISCHARGE INSTRUCTIONS
"   * BLADDER INFECTION,Female (Adult)    A bladder infection (\"cystitis\" or \"UTI\") usually causes a constant urge to urinate and a burning when passing urine. Urine may be cloudy, smelly or dark. There may be pain in the lower abdomen. A bladder infection occurs when bacteria from the vaginal area enter the bladder opening (urethra). This can occur from sexual intercourse, wearing tight clothing, dehydration and other factors.  HOME CARE:  1. Drink lots of fluids (at least 6-8 glasses a day, unless you must restrict fluids for other medical reasons). This will force the medicine into your urinary system and flush the bacteria out of your body. Cranberry juice has been shown to help clear out the bacteria.  2. Avoid sexual intercourse until your symptoms are gone.  3. A bladder infection is treated with antibiotics. You may also be given Pyridium (generic = phenazopyridine) to reduce the burning sensation. This medicine will cause your urine to become a bright orange color. The orange urine may stain clothing. You may wear a pad or panty-liner to protect clothing.  PREVENTING FUTURE INFECTIONS:  1. Always wipe from front to back after a bowel movement.  2. Keep the genital area clean and dry.  3. Drink plenty of fluids each day to avoid dehydration.  4. Urinate right after intercourse to flush out the bladder.  5. Wear cotton underwear and cotton-lined panty hose; avoid tight-fitting pants.  6. If you are on birth control pills and are having frequent bladder infections, discuss with your doctor.  FOLLOW UP: Return to this facility or see your doctor if ALL symptoms are not gone after three days of treatment.  GET PROMPT MEDICAL ATTENTION if any of the following occur:    Fever over 101 F (38.3 C)    No improvement by the third day of treatment    Increasing back or abdominal pain    Repeated vomiting; unable to keep medicine down    Weakness, dizziness or fainting    Vaginal discharge    Pain, redness or swelling in " the labia (outer vaginal area)    6304-3622 [employer], [address] [place] [address], [place] [address]. All rights reserved. This information is not intended as a substitute for professional medical care. Always follow your healthcare professional's instructions.

## 2017-07-01 NOTE — ED AVS SNAPSHOT
" [facility] Emergency Department    [address] [address]    Phone:  [phone]    Fax:  [phone]                                       Judith Sifuentes   MRN: [id number]    Department:  [facility] Emergency Department   Date of Visit:  7/1/2017           Patient Information     Date Of Birth          1960        Your diagnoses for this visit were:     Dementia with behavioral disturbance, unspecified dementia type     Pyuria        You were seen by Soham Samano MD.      Follow-up Information     Follow up with Erika Davey DO.    Specialty:  Internal Medicine    Why:  This week regarding further medication adjustments, plans regarding living situatiion    Contact information:    [facility]  [address] [address]  [phone]          Discharge Instructions          * BLADDER INFECTION,Female (Adult)    A bladder infection (\"cystitis\" or \"UTI\") usually causes a constant urge to urinate and a burning when passing urine. Urine may be cloudy, smelly or dark. There may be pain in the lower abdomen. A bladder infection occurs when bacteria from the vaginal area enter the bladder opening (urethra). This can occur from sexual intercourse, wearing tight clothing, dehydration and other factors.  HOME CARE:  1. Drink lots of fluids (at least 6-8 glasses a day, unless you must restrict fluids for other medical reasons). This will force the medicine into your urinary system and flush the bacteria out of your body. Cranberry juice has been shown to help clear out the bacteria.  2. Avoid sexual intercourse until your symptoms are gone.  3. A bladder infection is treated with antibiotics. You may also be given Pyridium (generic = phenazopyridine) to reduce the burning sensation. This medicine will cause your urine to become a bright orange color. The orange urine may stain clothing. You may wear a pad or panty-liner to protect " clothing.  PREVENTING FUTURE INFECTIONS:  1. Always wipe from front to back after a bowel movement.  2. Keep the genital area clean and dry.  3. Drink plenty of fluids each day to avoid dehydration.  4. Urinate right after intercourse to flush out the bladder.  5. Wear cotton underwear and cotton-lined panty hose; avoid tight-fitting pants.  6. If you are on birth control pills and are having frequent bladder infections, discuss with your doctor.  FOLLOW UP: Return to this facility or see your doctor if ALL symptoms are not gone after three days of treatment.  GET PROMPT MEDICAL ATTENTION if any of the following occur:    Fever over 101 F (38.3 C)    No improvement by the third day of treatment    Increasing back or abdominal pain    Repeated vomiting; unable to keep medicine down    Weakness, dizziness or fainting    Vaginal discharge    Pain, redness or swelling in the labia (outer vaginal area)    6602-6471 [employer], [address] [place] [address]. All rights reserved. This information is not intended as a substitute for professional medical care. Always follow your healthcare professional's instructions.      Future Appointments        Provider Department Dept Phone Center    10/23/2017 10:15 AM Esme Russell MD [facility] [phone] [facility]      24 Hour Appointment Hotline       To make an appointment at any [facility], call [phone] ([phone]). If you don't have a family doctor or clinic, we will help you find one. [facility] are conveniently located to serve the needs of you and your family.             Review of your medicines      START taking        Dose / Directions Last dose taken    LORazepam 1 MG tablet   Commonly known as:  ATIVAN   Dose:  0.5-1 mg   Quantity:  20 tablet        Take 0.5-1 tablets (0.5-1 mg) by mouth every 6 hours as needed for other (aggitation)   Refills:  0        sulfamethoxazole-trimethoprim 800-160 MG per tablet    Commonly known as:  BACTRIM DS   Dose:  1 tablet   Quantity:  6 tablet        Take 1 tablet by mouth 2 times daily for 3 days   Refills:  0          Our records show that you are taking the medicines listed below. If these are incorrect, please call your family doctor or clinic.        Dose / Directions Last dose taken    donepezil 10 MG tablet   Commonly known as:  ARICEPT   Dose:  10 mg   Quantity:  30 tablet        Take 1 tablet (10 mg) by mouth At Bedtime   Refills:  11        memantine 5 MG tablet   Commonly known as:  NAMENDA   Quantity:  120 tablet        Take 1 tab in the morning and 2 tabs (10mg) at night for one week and then increase to 2 tabs (10mg ) twice daily.   Refills:  11                Prescriptions were sent or printed at these locations (2 Prescriptions)                   Other Prescriptions                Printed at Department/Unit printer (2 of 2)         sulfamethoxazole-trimethoprim (BACTRIM DS) 800-160 MG per tablet               LORazepam (ATIVAN) 1 MG tablet                Procedures and tests performed during your visit     CBC with platelets differential    Comprehensive metabolic panel    UA reflex to Microscopic and Culture    Urine Culture Aerobic Bacterial      Orders Needing Specimen Collection     None      Pending Results     Date and Time Order Name Status Description    7/1/2017 1325 Urine Culture Aerobic Bacterial In process             Pending Culture Results     Date and Time Order Name Status Description    7/1/2017 1325 Urine Culture Aerobic Bacterial In process             Pending Results Instructions     If you had any lab results that were not finalized at the time of your Discharge, you can call the ED Lab Result RN at [phone]. You will be contacted by this team for any positive Lab results or changes in treatment. The nurses are available 7 days a week from 10A to 6:30P.  You can leave a message 24 hours per day and they will return your call.        Test  Results From Your Hospital Stay        7/1/2017  2:50 PM      Component Results     Component Value Ref Range & Units Status    WBC 7.9 4.0 - 11.0 10e9/L Final    RBC Count 4.51 3.8 - 5.2 10e12/L Final    Hemoglobin 14.1 11.7 - 15.7 g/dL Final    Hematocrit 42.7 35.0 - 47.0 % Final    MCV 95 78 - 100 fl Final    MCH 31.3 26.5 - 33.0 pg Final    MCHC 33.0 31.5 - 36.5 g/dL Final    RDW 13.4 10.0 - 15.0 % Final    Platelet Count 206 150 - 450 10e9/L Final    Diff Method Automated Method  Final    % Neutrophils 76.5 % Final    % Lymphocytes 15.5 % Final    % Monocytes 7.1 % Final    % Eosinophils 0.4 % Final    % Basophils 0.4 % Final    % Immature Granulocytes 0.1 % Final    Absolute Neutrophil 6.0 1.6 - 8.3 10e9/L Final    Absolute Lymphocytes 1.2 0.8 - 5.3 10e9/L Final    Absolute Monocytes 0.6 0.0 - 1.3 10e9/L Final    Absolute Eosinophils 0.0 0.0 - 0.7 10e9/L Final    Absolute Basophils 0.0 0.0 - 0.2 10e9/L Final    Abs Immature Granulocytes 0.0 0 - 0.4 10e9/L Final         7/1/2017  3:04 PM      Component Results     Component Value Ref Range & Units Status    Sodium 141 133 - 144 mmol/L Final    Potassium 3.7 3.4 - 5.3 mmol/L Final    Chloride 106 94 - 109 mmol/L Final    Carbon Dioxide 30 20 - 32 mmol/L Final    Anion Gap 5 3 - 14 mmol/L Final    Glucose 106 (H) 70 - 99 mg/dL Final    Urea Nitrogen 25 7 - 30 mg/dL Final    Creatinine 0.77 0.52 - 1.04 mg/dL Final    GFR Estimate 77 >60 mL/min/1.7m2 Final    Non  GFR Calc    GFR Estimate If Black >90   GFR Calc   >60 mL/min/1.7m2 Final    Calcium 9.2 8.5 - 10.1 mg/dL Final    Bilirubin Total 0.3 0.2 - 1.3 mg/dL Final    Albumin 4.0 3.4 - 5.0 g/dL Final    Protein Total 8.1 6.8 - 8.8 g/dL Final    Alkaline Phosphatase 110 40 - 150 U/L Final    ALT 28 0 - 50 U/L Final    AST 19 0 - 45 U/L Final         7/1/2017  2:00 PM      Component Results     Component Value Ref Range & Units Status    Color Urine Yellow  Final    Appearance Urine  "Clear  Final    Glucose Urine Negative NEG mg/dL Final    Bilirubin Urine Negative NEG Final    Ketones Urine Negative NEG mg/dL Final    Specific Gravity Urine 1.022 1.003 - 1.035 Final    Blood Urine Negative NEG Final    pH Urine 5.5 5.0 - 7.0 pH Final    Protein Albumin Urine 10 (A) NEG mg/dL Final    Urobilinogen mg/dL Normal 0.0 - 2.0 mg/dL Final    Nitrite Urine Negative NEG Final    Leukocyte Esterase Urine Moderate (A) NEG Final    Source Midstream Urine  Final    RBC Urine 2 0 - 2 /HPF Final    WBC Urine 13 (H) 0 - 2 /HPF Final    Mucous Urine Present (A) NEG /LPF Final         2017  2:35 PM                Thank you for choosing [facility]       Thank you for choosing [facility] for your care. Our goal is always to provide you with excellent care. Hearing back from our patients is one way we can continue to improve our services. Please take a few minutes to complete the written survey that you may receive in the mail after you visit with us. Thank you!        [employer] Information     [employer] lets you send messages to your doctor, view your test results, renew your prescriptions, schedule appointments and more. To sign up, go to [email or web address] . Click on \"Log in\" on the left side of the screen, which will take you to the Welcome page. Then click on \"Sign up Now\" on the right side of the page.     You will be asked to enter the access code listed below, as well as some personal information. Please follow the directions to create your username and password.     Your access code is: [email or web address]  Expires: 2017  3:22 PM     Your access code will  in 90 days. If you need help or a new code, please call your [facility] [facility] clinic or [phone].        Care EveryWhere ID     This is your Care EveryWhere ID. This could be used by other organizations to access your [facility] [facility] medical records  [id number]        Equal Access to Services     DARRYL GUERRA AH: laine Morillo, " ayaan matos ah. So [facility] [phone].    ATENCIÓN: Si habla nakiaañol, tiene a mcclendon disposición servicios gratuitos de asistencia lingüística. Llame al [phone].    We comply with applicable federal civil rights laws and [place] laws. We do not discriminate on the basis of race, color, national origin, age, disability sex, sexual orientation or gender identity.            After Visit Summary       This is your record. Keep this with you and show to your community pharmacist(s) and doctor(s) at your next visit.

## 2017-07-01 NOTE — ED PROVIDER NOTES
History     Chief Complaint   Patient presents with     Dementia     Anxiety     HPI  Judith Sifuentes is a 56 year old female who has a history of early onset of rapid dementia, prediabetes, and hyperlipidemia who presents to the ED for evaluation of dementia and anxiety. Today, she presents with her  and son. Son reports that for the last week her dementia has been getting progressively worse. There have been a few occasions that she was found standing on her own talking to people who are not there.  states that today, she was talking to a little boy who was not there. When her  told her that there was no little boy, patient started to get very upset and started to try and run out of the house. She also started to hit her .  reports that she stays at home during the days, and she is not currently at an assisted living center. Patient does not drink a lot of water per . She is currently seeing Dr. Russell in neurology. She is currently on namenda and Aricept. She does not drink alcohol. She denies recent illness, fever, cough, any pains, or urinary symptoms.    Patient Active Problem List   Diagnosis     Prediabetes     Hyperlipidemia LDL goal <130     Hair loss     Advanced directives, counseling/discussion     Dementia without behavioral disturbance, unspecified dementia type     Current Outpatient Prescriptions   Medication Sig Dispense Refill     sulfamethoxazole-trimethoprim (BACTRIM DS) 800-160 MG per tablet Take 1 tablet by mouth 2 times daily for 3 days 6 tablet 0     LORazepam (ATIVAN) 1 MG tablet Take 0.5-1 tablets (0.5-1 mg) by mouth every 6 hours as needed for other (aggitation) 20 tablet 0     memantine (NAMENDA) 5 MG tablet Take 1 tab in the morning and 2 tabs (10mg) at night for one week and then increase to 2 tabs (10mg ) twice daily. (Patient taking differently: Take 5 mg by mouth 2 times daily Take 1 tab in the morning and 2 tabs (10mg) at night  for one week and then increase to 2 tabs (10mg ) twice daily.) 120 tablet 11     donepezil (ARICEPT) 10 MG tablet Take 1 tablet (10 mg) by mouth At Bedtime 30 tablet 11     No Known Allergies    I have reviewed the Medications, Allergies, Past Medical and Surgical History, and Social History in the Epic system.    Review of Systems  All other systems are reviewed and are negative.    Physical Exam   BP: 129/80  Pulse: 74  Temp: 98.2  F (36.8  C)  Resp: 18  SpO2: 99 %  Physical Exam  Nontoxic-appearing no respiratory distress alert and oriented to self,  and son.  Head atraumatic normocephalic  Cranial nerves; vision baseline fields intact, PERRL, EOMI, facial sensation intact to light touch, facial muscle tone intact and symmetrical, hearing grossly intact,swallowing without difficulty, voice baseline, SCM  strength intact, tongue protrudes midline.  TM's unremarkable, EACs clear, oropharynx moist without lesions or erythema, palatal elevation symmetric, neck supple full active painless range of motion no posterior midline tenderness.  Lungs clear to auscultation no rales rhonchi or wheezes  Heart regular no murmur S3 or rub  Abdomen soft nontender bowel sounds positive no masses or HSM  Strength and sensation intact throughout the extremities, skin clear from rash or lesion.  There is no drift or dysmetria, no truncal ataxia    ED Course     ED Course     Procedures             Critical Care time:  none               Labs Ordered and Resulted from Time of ED Arrival Up to the Time of Departure from the ED   COMPREHENSIVE METABOLIC PANEL - Abnormal; Notable for the following:        Result Value    Glucose 106 (*)     All other components within normal limits   UA MACROSCOPIC WITH REFLEX TO MICRO AND CULTURE - Abnormal; Notable for the following:     Protein Albumin Urine 10 (*)     Leukocyte Esterase Urine Moderate (*)     WBC Urine 13 (*)     Mucous Urine Present (*)     All other components within normal  limits   CBC WITH PLATELETS DIFFERENTIAL   URINE CULTURE AEROBIC BACTERIAL     Results for orders placed or performed during the hospital encounter of 07/01/17 (from the past 24 hour(s))   UA reflex to Microscopic and Culture   Result Value Ref Range    Color Urine Yellow     Appearance Urine Clear     Glucose Urine Negative NEG mg/dL    Bilirubin Urine Negative NEG    Ketones Urine Negative NEG mg/dL    Specific Gravity Urine 1.022 1.003 - 1.035    Blood Urine Negative NEG    pH Urine 5.5 5.0 - 7.0 pH    Protein Albumin Urine 10 (A) NEG mg/dL    Urobilinogen mg/dL Normal 0.0 - 2.0 mg/dL    Nitrite Urine Negative NEG    Leukocyte Esterase Urine Moderate (A) NEG    Source Midstream Urine     RBC Urine 2 0 - 2 /HPF    WBC Urine 13 (H) 0 - 2 /HPF    Mucous Urine Present (A) NEG /LPF   CBC with platelets differential   Result Value Ref Range    WBC 7.9 4.0 - 11.0 10e9/L    RBC Count 4.51 3.8 - 5.2 10e12/L    Hemoglobin 14.1 11.7 - 15.7 g/dL    Hematocrit 42.7 35.0 - 47.0 %    MCV 95 78 - 100 fl    MCH 31.3 26.5 - 33.0 pg    MCHC 33.0 31.5 - 36.5 g/dL    RDW 13.4 10.0 - 15.0 %    Platelet Count 206 150 - 450 10e9/L    Diff Method Automated Method     % Neutrophils 76.5 %    % Lymphocytes 15.5 %    % Monocytes 7.1 %    % Eosinophils 0.4 %    % Basophils 0.4 %    % Immature Granulocytes 0.1 %    Absolute Neutrophil 6.0 1.6 - 8.3 10e9/L    Absolute Lymphocytes 1.2 0.8 - 5.3 10e9/L    Absolute Monocytes 0.6 0.0 - 1.3 10e9/L    Absolute Eosinophils 0.0 0.0 - 0.7 10e9/L    Absolute Basophils 0.0 0.0 - 0.2 10e9/L    Abs Immature Granulocytes 0.0 0 - 0.4 10e9/L   Comprehensive metabolic panel   Result Value Ref Range    Sodium 141 133 - 144 mmol/L    Potassium 3.7 3.4 - 5.3 mmol/L    Chloride 106 94 - 109 mmol/L    Carbon Dioxide 30 20 - 32 mmol/L    Anion Gap 5 3 - 14 mmol/L    Glucose 106 (H) 70 - 99 mg/dL    Urea Nitrogen 25 7 - 30 mg/dL    Creatinine 0.77 0.52 - 1.04 mg/dL    GFR Estimate 77 >60 mL/min/1.7m2    GFR Estimate  If Black >90   GFR Calc   >60 mL/min/1.7m2    Calcium 9.2 8.5 - 10.1 mg/dL    Bilirubin Total 0.3 0.2 - 1.3 mg/dL    Albumin 4.0 3.4 - 5.0 g/dL    Protein Total 8.1 6.8 - 8.8 g/dL    Alkaline Phosphatase 110 40 - 150 U/L    ALT 28 0 - 50 U/L    AST 19 0 - 45 U/L       Medications   LORazepam (ATIVAN) tablet 0.5 mg (not administered)       12:17 PM Patient assessed.     Assessments & Plan (with Medical Decision Making)  56-year-old female presents with family from home, episodic agitation, context early rapidly progressive dementia.  Agitation recent development.  No obvious trigger based on history, exam and workup, urine significant for 13 white cells without urinary complaint or other abnormality on UA.  Culture done pending, treat empirically 3 days Bactrim.  Recommend continuing Aricept and Namenda as previously prescribed.  Prescription for lorazepam 0.5-1 mg every 6 hours as needed for agitation.  This may or may not be helpful in this situation.  Recommend close follow-up this coming week with primary care and neurology regarding further evaluation, medication adjustment, and discussion surrounding living situation.  Given the description of her behaviors recently she is not appropriate for home care.  Discussed return criteria,  and son expressed understanding and agreement.       I have reviewed the nursing notes.    I have reviewed the findings, diagnosis, plan and need for follow up with the patient.       New Prescriptions    LORAZEPAM (ATIVAN) 1 MG TABLET    Take 0.5-1 tablets (0.5-1 mg) by mouth every 6 hours as needed for other (aggitation)    SULFAMETHOXAZOLE-TRIMETHOPRIM (BACTRIM DS) 800-160 MG PER TABLET    Take 1 tablet by mouth 2 times daily for 3 days       Final diagnoses:   Dementia with behavioral disturbance, unspecified dementia type   Pyuria     This document serves as a record of the services and decisions personally performed and made by Soham Samano MD. It  was created on HIS/HER behalf by   Kayley Parisi, a trained medical scribe. The creation of this document is based the provider's statements to the medical scribe.  Kayley Parisi 12:17 PM 7/1/2017    Provider:   The information in this document, created by the medical scribe for me, accurately reflects the services I personally performed and the decisions made by me. I have reviewed and approved this document for accuracy prior to leaving the patient care area.  Soham Samano MD 12:17 PM 7/1/2017 7/1/2017   [facility] EMERGENCY DEPARTMENT     Soham Samano MD  07/01/17 1532

## 2017-07-03 ENCOUNTER — CARE COORDINATION (OUTPATIENT)
Dept: CARE COORDINATION | Facility: CLINIC | Age: 57
End: 2017-07-03

## 2017-07-03 LAB
BACTERIA SPEC CULT: NORMAL
MICRO REPORT STATUS: NORMAL
SPECIMEN SOURCE: NORMAL

## 2017-07-03 RX ORDER — LORAZEPAM 1 MG/1
.5-1 TABLET ORAL EVERY 6 HOURS PRN
Qty: 20 TABLET | Refills: 0 | OUTPATIENT
Start: 2017-07-03

## 2017-07-03 NOTE — PROGRESS NOTES
Clinic Care Coordination Contact  Care Team Conversations    Spouse of pt had left 2 voice mails on Genesis Gibbons's voice mail.  This Sw followed up as regular Sw was out.  Spouse said they were doing ok now and that the medication form the Ed was helping out.  They do have a neurology appointment on Thursday and he wants to wait until that to make any changes/decisions.  Did discuss the Alzheimer's help line and provided him with the phone number - [phone].  Spouse declined any other needs or resources at this time.    Spouse was not very forthcoming with any information and declined that he had any questions when asked on a few occassions.  He did want a follow up call from Genesis and note will be routed for a follow up call in about one week.     CANDICE Bagley, Clinic Care Coordinator 7/3/2017   10:38 AM  [phone]

## 2017-07-03 NOTE — TELEPHONE ENCOUNTER
Reason for Call:  Other prescription    Detailed comments: pt's  calling in to see if they can get a refill. He talked to Dr Russell's clinic and they told him he needed to go through Dr Davey. Pt has severe dementia and gets very agitated according to pt's . She was seen in the ER and given ativan but he only has 6 pills to last until Thursday when they see Dr Russell. He said that won't be enough, she gets really bad and these really helped calm her down.    Phone Number Patient can be reached at: Home number on file [phone] (home)    Best Time: any    Can we leave a detailed message on this number? YES    Call taken on 7/3/2017 at 9:34 AM by Shawanda Hanna

## 2017-07-03 NOTE — TELEPHONE ENCOUNTER
Lorazepam not recommended for patients with dementia and this was never prescribed by Dr. Davey. The patient have enough medication until her appt with Dr. Russell. Recommend to keep appt with Dr. Russell and discuss more appropriate medications for agitation management.     LASHA Patel CNP

## 2017-07-05 ENCOUNTER — TELEPHONE (OUTPATIENT)
Dept: NEUROLOGY | Facility: CLINIC | Age: 57
End: 2017-07-05

## 2017-07-05 NOTE — TELEPHONE ENCOUNTER
I spoke with Judith's  Tavon today as well as [facility] Gibbons about her case.   I cautioned Tavon against the overuse of Ativan - use 1/2 tab at a time at max and if things escalate again she should return to the ED.  He understands and tells me that he will be accompanied by all of their children and Judith's mother in clinic tomorrow.  I did ask that if possible, I would like them to come in early to meet with the  prior to the appt.     Esme Russell

## 2017-07-06 ENCOUNTER — TELEPHONE (OUTPATIENT)
Dept: NEUROLOGY | Facility: CLINIC | Age: 57
End: 2017-07-06

## 2017-07-06 ENCOUNTER — CARE COORDINATION (OUTPATIENT)
Dept: CARE COORDINATION | Facility: CLINIC | Age: 57
End: 2017-07-06

## 2017-07-06 ENCOUNTER — OFFICE VISIT (OUTPATIENT)
Dept: NEUROLOGY | Facility: CLINIC | Age: 57
End: 2017-07-06
Payer: COMMERCIAL

## 2017-07-06 DIAGNOSIS — G30.0 EARLY ONSET ALZHEIMER'S DISEASE WITH BEHAVIORAL DISTURBANCE (H): Primary | ICD-10-CM

## 2017-07-06 DIAGNOSIS — F02.818 EARLY ONSET ALZHEIMER'S DISEASE WITH BEHAVIORAL DISTURBANCE (H): Primary | ICD-10-CM

## 2017-07-06 PROCEDURE — 99215 OFFICE O/P EST HI 40 MIN: CPT | Performed by: PSYCHIATRY & NEUROLOGY

## 2017-07-06 RX ORDER — QUETIAPINE FUMARATE 25 MG/1
TABLET, FILM COATED ORAL
Qty: 60 TABLET | Refills: 3 | Status: ON HOLD | OUTPATIENT
Start: 2017-07-06 | End: 2017-07-18

## 2017-07-06 NOTE — LETTER
July 6, 2017      Judith Sifuentes  [address]  [place] [address]    Dear Judith & family,  I am the Clinic Care Coordinator that works with your primary care provider's clinic and I wanted to thank you for spending the time to talk with me today in clinic.      As discussed, I will contact [facility] and request that an application for [facility] funds & resources be mailed to your home.  I have also provided you with information on [employer] for you to contact for assistance in your home.  Please call them.    Together, we will work on getting the resources that will best care for Caity & your family as caregivers.  Please feel free to contact me at [phone] with any further questions or concerns that may arise. We at [facility] are focused on providing you with the highest-quality healthcare experience possible and that all starts with you.       Sincerely,     Genesis Melvin    Enclosed: I have enclosed a copy of the Complex Care Plan. This has helpful information and goals that we have talked about. Please keep this in an easy to access place to use as needed.

## 2017-07-06 NOTE — PROGRESS NOTES
Clinic Care Coordination Contact  OUTREACH    Referral Information:  Referral Source: PCP  Reason for Contact: Pt with progressive dementia & family with caregiver support burnout  Care Conference: Yes    SW met with pt, spouse-Tavon, daughter-Fiona, son-Ramy and daughter in law-Phyllis.  Son-Sammy is working.  Family shared that the past 3 weeks have been very difficult, as pt is waking around 2-3 in the morning, hallucinating of a little boy outside in the freezing snow and that she must go and save him.  Tavon shared that he is sleeping in front of the door, only getting about 2-3 hours of sleep/night and is exhausted.  Discussed how the family has safety proofed the home, even using interesting techniques such as bringing a driveway sensor (motion sensor) into the kitchen to try to monitor and alert family of pt elopement attempts.  Fiona works PT-FT and is planning on going to college this fall, Sammy is working a lot of OT and Tavon states he needs to get back to work due to financial crisis of the household.  He is currently on a FLMA to care for the pt.     Per Tavon & family, if pt's behaviors can be better managed & they can get assistance of 8 hours of help in the home per day, they would like to keep the pt at home.  If not, they will need to explore placing the pt in a facility as they are unable to manage at home any longer.  SW discussed utilizing friends, family, networks who could provide even an afternoon per week & family said they are unable to think of anyone to help them.    SW provided education to pt's family about how to apply for a LTCC (long term care consultation) with [facility] to see what services the pt will be eligible to receive.  Tavon shared he has had the paperwork from the [facility], but never completed it.  DAYTON stressed the importance of why the [facility] is a great agency to partner with during this time as they have the funds to provide the services needed to best care for the pt.   Tavon stated he understood, asked this writer to connect with the county to ensure he has the proper forms to complete and will do so in the near future.  DAYTON placed call and left message for Lurdes Durbin, [phone] with Adult Services.     [facility] Utilization:   ED Visits in last year: 1  Hospital visits in last year: 0  Last PCP appointment: 07/06/17  Missed Appointments: 0  Concerns: yes  Multiple Providers or Specialists: yes    Clinical Concerns:  Current Medical Concerns: Pt is displaying alzheimer's related behaviors such as hallucinations, eloping, wandering    Current Behavioral Concerns: as above    Education Provided to patient: see above     Clinical Pathway: None    Medication Management:  Pt was seeing MD Russell today to assist with medications for behaviors due to dementia.     Functional Status:  Mobility Status: Independent  Equipment Currently Used at Home: none  Transportation: Family transports      Psychosocial:  Current living arrangement:: I live in a private home with family-, Tavon- Daughter, Fiona- Son-Sammy  Financial/Insurance: SSDI and uncertain of Tavon's income at this time.  Medicare & [employer]     Resources and Interventions:  Current Resources: None      Advanced Care Plans/Directives on file: No  Referrals Placed: Community Resources, [facility] Resources, [facility]     Goals:   Goal 1 Statement: I will research and apply for resources for [facility]  Goal 1 Progression Percent: 30%  Goal 1 Progression Date: 07/06/17  Barriers: Family is very overwhelmed and may not contact resources as needed  Strengths: Family is doing their best to keep pt safe and at home  Patient/Caregiver understanding: Family is understanding that they will need to apply for [facility] resources to bring help into the home  Frequency of Care Coordination: 1 week, monthly, PRN        Plan: DAYTON to continue to follow.

## 2017-07-06 NOTE — LETTER
[facility]  Complex Care Plan  About Me  Patient Name:  Judith Sifuentes  YOB: 1960  Age:   56 year old   [place] MRN: [id number] Telephone Information:     Home Phone [phone]   Mobile [phone]       Address:    [address]  [place] [address] Email address:  No e-mail address on record      Emergency Contact(s)  Name Relationship Lgl Grd Work Phone Home Phone Mobile Phone   1. LIDIA SIFUENTES Spouse  NONE [phone]   2. JOSE CORDOVA Father  NONE [phone] NONE   3. MINDY VALENTIN Friend  NONE [phone] NONE           Primary language:  English     needed? No   [place] [facility]:  [phone] op. 1  Other communication barriers: Yes, pt with Alzheimer's Dementia  Preferred Method of Communication:  Phone  Current living arrangement: I live in a private home with family  Mobility Status/ Medical Equipment: Independent  My Access Plan  Medical Emergency 911   Primary Clinic Line [facility]- [phone]   24 Hour Appointment Line [phone] or  [phone]) (toll-free)   24 Hour Nurse Line [phone] (toll-free)   Preferred Urgent Care [facility], [phone]   Preferred Hospital [place]  [phone]   Preferred Pharmacy [facility] [address] [facility]     Behavioral Health Crisis Line [facility], [phone] or 911   My Care Team Members  Patient Care Team       Relationship Specialty Notifications Start End    Erika Davey DO PCP - General Internal Medicine  2/1/16     Phone: [phone] Pager: [phone] Fax: [phone]        [facility] [address] [facility] [address]    Genesis Melvin   Admissions 3/1/16     Comment:  [phone]    Phone: [phone] Fax: [phone]        Esme Russell MD Referring Physician Neurosurgery  3/16/16      Comment:  referring to neuropsych    Phone: [phone] Fax: [phone]         [address] [facility] [address]    Judith Lewis, LP   Psychology  3/16/16     Phone: [phone] Fax: [phone]         [facility] [address] [facility] [facility] [address]         My Care Plans  Self Management and Treatment Plan  Goals and (Comments)  Goal #1: I will research and apply for resources for [facility]        30% of goal reached      My Medical and Care Information  Problem List   Patient Active Problem List   Diagnosis     Prediabetes     Hyperlipidemia LDL goal <130     Hair loss     Advanced directives, counseling/discussion     Dementia without behavioral disturbance, unspecified dementia type      Current Medications and Allergies:  See printed Medication Report.    Care Coordination Start Date: 07/03/17   Frequency of Care Coordination: 1 week, monthly, PRN   Form Last Updated: 07/06/2017

## 2017-07-06 NOTE — PATIENT INSTRUCTIONS
Plan:    Continue the Namenda 5mg twice daily.   Stop the Aricept. Stop the Ativan.   Start Seroquel: 25mg at bedtime. 1/2 tab during the day as needed. *Information provided.  Let me know how Judit is doing in a week or two.   Return to clinic in 2 months. We will recheck some labs at that time.

## 2017-07-06 NOTE — PROGRESS NOTES
ESTABLISHED PATIENT NEUROLOGY NOTE    DATE OF VISIT: 7/6/2017  MRN: [id number]  PATIENT NAME: Judith Sifuentes  YOB: 1960    Chief Complaint   Patient presents with     RECHECK     worsening dementia     SUBJECTIVE:                                                      HISTORY OF PRESENT ILLNESS:  Judith is here for follow up regarding rapidly-progressive dementia and new behavioral concerns. She was seen in the [facility] ED for behavioral outbursts that the  was having difficulty managing at home. She has been on Aricept and Namenda for a while, without clear benefit  It is noted that she had a UTI at the time of the ED visit. She was given Ativan for the agitation. She was given a three day course of bactrim for the UTI.     The patient and family denied hallucinations or behavioral disturbance at her visit in April.     The patient is accompanied in clinic today by her  (Tavon) and the patient's adult children. The family explains that the episode leading to the ED visit was a new phenomenon. She has had some brief problems with hallucinations, specifically worry about a little boy she sees outside their home that she wants to go save from the cold. She saw some boys at the holiday parade a few days ago, but was only briefly bothered by this. When it occurs from inside the house, she wants to get out and her  is becoming very fatigued at trying to manage her. In addition, she is up during the night at times so he has had to keep watch. The children help, but they have work/school so they will have limited availability in the near future.     The family is interested in keeping Judit at home, but would like to see about getting some help for respite and so that Tavon can go back to work. Our  has been working with the family over the past couple of days, since the incident, so the family has additional resources to turn to in the near future. Tavon is not  concerned about any safety issues at home, except for the potential for Judit to escape. No clear triggers for the hallucinations, but she does seem to have them more frequently in the morning. She sleeps some during the night but also naps on and off throughout the day. She has had some increased clumsiness with the use of the Ativan.     Judit enjoys music from the 1970's so he has been playing this for her. She also enjoys baths with Lavender bubble bath. Appetite is good.      Family has talked about getting an aromatherapy diffuser in the house.     No side effects to report regarding the Namenda and Aricept.     CURRENT MEDICATIONS:     Current Outpatient Prescriptions on File Prior to Visit:  LORazepam (ATIVAN) 1 MG tablet Take 0.5-1 tablets (0.5-1 mg) by mouth every 6 hours as needed for other (aggitation)   memantine (NAMENDA) 5 MG tablet Take 1 tab in the morning and 2 tabs (10mg) at night for one week and then increase to 2 tabs (10mg ) twice daily. (Patient taking differently: Take 5 mg by mouth 2 times daily Take 1 tab in the morning and 2 tabs (10mg) at night for one week and then increase to 2 tabs (10mg ) twice daily.)     No current facility-administered medications on file prior to visit.     RECENT DIAGNOSTIC STUDIES:   Labs:   Results for orders placed or performed during the hospital encounter of 07/01/17   CBC with platelets differential   Result Value Ref Range    WBC 7.9 4.0 - 11.0 10e9/L    RBC Count 4.51 3.8 - 5.2 10e12/L    Hemoglobin 14.1 11.7 - 15.7 g/dL    Hematocrit 42.7 35.0 - 47.0 %    MCV 95 78 - 100 fl    MCH 31.3 26.5 - 33.0 pg    MCHC 33.0 31.5 - 36.5 g/dL    RDW 13.4 10.0 - 15.0 %    Platelet Count 206 150 - 450 10e9/L    Diff Method Automated Method     % Neutrophils 76.5 %    % Lymphocytes 15.5 %    % Monocytes 7.1 %    % Eosinophils 0.4 %    % Basophils 0.4 %    % Immature Granulocytes 0.1 %    Absolute Neutrophil 6.0 1.6 - 8.3 10e9/L    Absolute Lymphocytes 1.2 0.8 - 5.3  10e9/L    Absolute Monocytes 0.6 0.0 - 1.3 10e9/L    Absolute Eosinophils 0.0 0.0 - 0.7 10e9/L    Absolute Basophils 0.0 0.0 - 0.2 10e9/L    Abs Immature Granulocytes 0.0 0 - 0.4 10e9/L   Comprehensive metabolic panel   Result Value Ref Range    Sodium 141 133 - 144 mmol/L    Potassium 3.7 3.4 - 5.3 mmol/L    Chloride 106 94 - 109 mmol/L    Carbon Dioxide 30 20 - 32 mmol/L    Anion Gap 5 3 - 14 mmol/L    Glucose 106 (H) 70 - 99 mg/dL    Urea Nitrogen 25 7 - 30 mg/dL    Creatinine 0.77 0.52 - 1.04 mg/dL    GFR Estimate 77 >60 mL/min/1.7m2    GFR Estimate If Black >90   GFR Calc   >60 mL/min/1.7m2    Calcium 9.2 8.5 - 10.1 mg/dL    Bilirubin Total 0.3 0.2 - 1.3 mg/dL    Albumin 4.0 3.4 - 5.0 g/dL    Protein Total 8.1 6.8 - 8.8 g/dL    Alkaline Phosphatase 110 40 - 150 U/L    ALT 28 0 - 50 U/L    AST 19 0 - 45 U/L   UA reflex to Microscopic and Culture   Result Value Ref Range    Color Urine Yellow     Appearance Urine Clear     Glucose Urine Negative NEG mg/dL    Bilirubin Urine Negative NEG    Ketones Urine Negative NEG mg/dL    Specific Gravity Urine 1.022 1.003 - 1.035    Blood Urine Negative NEG    pH Urine 5.5 5.0 - 7.0 pH    Protein Albumin Urine 10 (A) NEG mg/dL    Urobilinogen mg/dL Normal 0.0 - 2.0 mg/dL    Nitrite Urine Negative NEG    Leukocyte Esterase Urine Moderate (A) NEG    Source Midstream Urine     RBC Urine 2 0 - 2 /HPF    WBC Urine 13 (H) 0 - 2 /HPF    Mucous Urine Present (A) NEG /LPF   Urine Culture Aerobic Bacterial   Result Value Ref Range    Specimen Description Midstream Urine     Culture Micro No growth after 2 days     Micro Report Status FINAL 07/03/2017        REVIEW OF SYSTEMS:                                                      Patient is unable to participate in full ROS due to cognitive impairment. Denies pain.     EXAM:                                                      Physical Exam: (most recent dose of Ativan was about 3 hours prior to my exam)  Vitals: LMP  09/18/2013  BMI= There is no height or weight on file to calculate BMI.   GENERAL:   FOcused Neurologic:   MS: Patient is sleepy, but alerts easily. She answers questions appropriately. She is able to name her favorite food: blueberry pie.   CRANIAL NERVES: Pupils equally, round and sluggishly reactive to light. Facial movement normal. EOM full. Hearing to conversation. Trapezius strength intact. Palate moves symmetrically. Tongue midline.  MOTOR: 5/5 in proximal and distal muscle groups of upper and lower extremities. Tone and bulk normal. No abnormal movements observed.  SENSATION: Normal light touch throughout.  COORDINATION: Clumsy opening/closing speed and amplitude. Apraxic.  STATION AND GAIT: Gait is slightly ataxic.   CV: RRR. S1, S2.     ASSESSMENT and PLAN:                                                      Assessment and Plan:    ICD-10-CM    1. Early onset Alzheimer's disease with behavioral disturbance G30.0 QUEtiapine (SEROQUEL) 25 MG tablet    F02.81        MS. Denys Sifuentes is a pleasant 55 yo woman with likely rapidly-progressive early onset A;zheimer's dementia. She continues to decline and had a recent episode of uncontrollable agitation leading to an ED visit and new hallucinations. The UTI may have been at least in part the cause, but it is clear that the patient continue to decline as would be expected with her neurodegenerative disease. The family is working with our  with regards to help at home. From a neuropsychiatric standpoint, the patient has not really needed treatment in the past and I advised them the benzodiazepines are not the solution for the long-term. I would like to stop the Aricept and start Seroquel (I would be worried about concurrent use of these with potential for QT prolongation). We will continue the Namenda.     Phone numbers for the Nurse Triage line were given to the patient's family as well as some information regarding [facility] resources. I  agree that it is time for the  to get some respite. He is considering placement in the future so we also briefly discussed this. Keeping her at home is desired for now. I encouraged them to keep up the good work with the music and activities to help stimulate Judit. Hopefully the Seroquel will help to normalize her sleep/wake cycle and calm her during the day. I asked them to let us know if they need any additional help. The patient's family understands and agrees with the plan.     Patient Instructions:  Continue the Namenda 5mg twice daily.   Stop the Aricept. Stop the Ativan.   Start Seroquel: 25mg at bedtime. 1/2 tab during the day as needed. *Information provided.  Let me know how Judit is doing in a week or two.   Return to clinic in 2 months. We will recheck some labs at that time.     Total Time: 40 minutes were spent with the patient. More than 50% of the time spent on counseling (as described above in Assessment and Plan) /coordinating the care.    Esme Russell MD  Neurology    CC: Erika Davey DO

## 2017-07-06 NOTE — TELEPHONE ENCOUNTER
asking for clarification on medication changes.  Concern seems to be name brand vs. Generic.      We reviewed the AVS from today:    Patient Instructions:  Continue the Namenda 5mg twice daily.   Stop the Aricept. Stop the Ativan.   Start Seroquel: 25mg at bedtime. 1/2 tab during the day as needed.    1) They have memantine 10 mg.  I had him spell the name from the bottle.  I advised him to split the pills and give her 1/2 tablet morning and night.  2) Confirmed they should stop the donepezil.  They will dispose of them.  3) Confirmed they should stop the lorazepam.  4) They've picked up the quetiapine and said they understood those instructions.

## 2017-07-06 NOTE — MR AVS SNAPSHOT
After Visit Summary   7/6/2017    Judith Sifuentes    MRN: [id number]           Patient Information     Date Of Birth          1960        Visit Information        Provider Department      7/6/2017 9:30 AM Esme Russell MD [facility]        Today's Diagnoses     Early onset Alzheimer's disease with behavioral disturbance    -  1      Care Instructions    Plan:    Continue the Namenda 5mg twice daily.   Stop the Aricept. Stop the Ativan.   Start Seroquel: 25mg at bedtime. 1/2 tab during the day as needed. *Information provided.  Let me know how Judit is doing in a week or two.   Return to clinic in 2 months. We will recheck some labs at that time.           Follow-ups after your visit        Follow-up notes from your care team     Return in about 2 months (around 9/6/2017).      Your next 10 appointments already scheduled     Oct 23, 2017 10:15 AM CDT   Return Visit with Esme Russell MD   [facility] ([facility])    [address] [address] [phone]              Who to contact     If you have questions or need follow up information about today's clinic visit or your schedule please contact [facility] directly at [phone].  Normal or non-critical lab and imaging results will be communicated to you by [employer], letter or phone within 4 business days after the clinic has received the results. If you do not hear from us within 7 days, please contact the clinic through [employer] or phone. If you have a critical or abnormal lab result, we will notify you by phone as soon as possible.  Submit refill requests through [employer] or call your pharmacy and they will forward the refill request to us. Please allow 3 business days for your refill to be completed.          Additional Information About Your Visit        [employer] Information     [employer] lets you send messages to your doctor, view your test results,  "renew your prescriptions, schedule appointments and more. To sign up, go to [email or web address] . Click on \"Log in\" on the left side of the screen, which will take you to the Welcome page. Then click on \"Sign up Now\" on the right side of the page.     You will be asked to enter the access code listed below, as well as some personal information. Please follow the directions to create your username and password.     Your access code is: [email or web address]  Expires: 2017  3:22 PM     Your access code will  in 90 days. If you need help or a new code, please call your [facility] clinic or [phone].        Care EveryWhere ID     This is your Care EveryWhere ID. This could be used by other organizations to access your [facility] medical records  [id number]        Your Vitals Were     Last Period                   2013            Blood Pressure from Last 3 Encounters:   17 136/67   17 111/64   03/10/17 121/68    Weight from Last 3 Encounters:   17 110 lb (49.9 kg)   03/10/17 114 lb (51.7 kg)   17 119 lb 12.8 oz (54.3 kg)              Today, you had the following     No orders found for display         Today's Medication Changes          These changes are accurate as of: 17 10:14 AM.  If you have any questions, ask your nurse or doctor.               Start taking these medicines.        Dose/Directions    QUEtiapine 25 MG tablet   Commonly known as:  SEROQUEL   Used for:  Early onset Alzheimer's disease with behavioral disturbance   Started by:  Esme Russell MD        25mg (1 tab) at bedtime. Okay to give 1/2 tab 2-3 times during the day as needed.   Quantity:  60 tablet   Refills:  3         These medicines have changed or have updated prescriptions.        Dose/Directions    memantine 5 MG tablet   Commonly known as:  NAMENDA   This may have changed:    - how much to take  - how to take this  - when to take this  - additional instructions   Used for:  Dementia without " behavioral disturbance, unspecified dementia type        Take 1 tab in the morning and 2 tabs (10mg) at night for one week and then increase to 2 tabs (10mg ) twice daily.   Quantity:  120 tablet   Refills:  11         Stop taking these medicines if you haven't already. Please contact your care team if you have questions.     donepezil 10 MG tablet   Commonly known as:  ARICEPT   Stopped by:  Esme Russell MD                Where to get your medicines      These medications were sent to [address] [address]     Phone:  [phone]     QUEtiapine 25 MG tablet                Primary Care Provider Office Phone # Fax #    Erika aDvey,  [phone]       [facility] [address] [address]        Equal Access to Services     DARRYL GUERRA : Hadii chuckie nicole hadasho Soomaali, waaxda luqadaha, qaybta kaalmada adeegyada, ayaan li hayyanick campa . So [facility] [phone].    ATENCIÓN: Si habla español, tiene a mcclendon disposición servicios gratuitos de asistencia lingüística. Arjun al [phone].    We comply with applicable federal civil rights laws and [place] laws. We do not discriminate on the basis of race, color, national origin, age, disability sex, sexual orientation or gender identity.            Thank you!     Thank you for choosing [facility]  for your care. Our goal is always to provide you with excellent care. Hearing back from our patients is one way we can continue to improve our services. Please take a few minutes to complete the written survey that you may receive in the mail after your visit with us. Thank you!             Your Updated Medication List - Protect others around you: Learn how to safely use, store and throw away your medicines at www.disposemymeds.org.          This list is accurate as of: 7/6/17 10:14 AM.  Always use your most recent med  list.                   Brand Name Dispense Instructions for use Diagnosis    LORazepam 1 MG tablet    ATIVAN    20 tablet    Take 0.5-1 tablets (0.5-1 mg) by mouth every 6 hours as needed for other (aggitation)        memantine 5 MG tablet    NAMENDA    120 tablet    Take 1 tab in the morning and 2 tabs (10mg) at night for one week and then increase to 2 tabs (10mg ) twice daily.    Dementia without behavioral disturbance, unspecified dementia type       QUEtiapine 25 MG tablet    SEROQUEL    60 tablet    25mg (1 tab) at bedtime. Okay to give 1/2 tab 2-3 times during the day as needed.    Early onset Alzheimer's disease with behavioral disturbance

## 2017-07-07 ENCOUNTER — HOSPITAL ENCOUNTER (EMERGENCY)
Facility: CLINIC | Age: 57
Discharge: ANOTHER HOSPITAL | End: 2017-07-07
Attending: NURSE PRACTITIONER | Admitting: NURSE PRACTITIONER
Payer: COMMERCIAL

## 2017-07-07 ENCOUNTER — CARE COORDINATION (OUTPATIENT)
Dept: CARE COORDINATION | Facility: CLINIC | Age: 57
End: 2017-07-07

## 2017-07-07 ENCOUNTER — TELEPHONE (OUTPATIENT)
Dept: NEUROLOGY | Facility: CLINIC | Age: 57
End: 2017-07-07

## 2017-07-07 ENCOUNTER — HOSPITAL ENCOUNTER (INPATIENT)
Facility: CLINIC | Age: 57
LOS: 11 days | Discharge: NURSING, REHAB OR LONG-TERM CARE | DRG: 057 | End: 2017-07-18
Attending: INTERNAL MEDICINE | Admitting: INTERNAL MEDICINE
Payer: COMMERCIAL

## 2017-07-07 ENCOUNTER — APPOINTMENT (OUTPATIENT)
Dept: CT IMAGING | Facility: CLINIC | Age: 57
End: 2017-07-07
Attending: NURSE PRACTITIONER
Payer: COMMERCIAL

## 2017-07-07 VITALS
RESPIRATION RATE: 18 BRPM | DIASTOLIC BLOOD PRESSURE: 89 MMHG | WEIGHT: 110 LBS | BODY MASS INDEX: 20.78 KG/M2 | SYSTOLIC BLOOD PRESSURE: 122 MMHG | OXYGEN SATURATION: 97 % | HEART RATE: 61 BPM | TEMPERATURE: 98.1 F

## 2017-07-07 DIAGNOSIS — F02.818 EARLY ONSET ALZHEIMER'S DISEASE WITH BEHAVIORAL DISTURBANCE (H): ICD-10-CM

## 2017-07-07 DIAGNOSIS — F03.90 DEMENTIA WITHOUT BEHAVIORAL DISTURBANCE, UNSPECIFIED DEMENTIA TYPE: Primary | ICD-10-CM

## 2017-07-07 DIAGNOSIS — K59.01 SLOW TRANSIT CONSTIPATION: ICD-10-CM

## 2017-07-07 DIAGNOSIS — R44.3 HALLUCINATION: ICD-10-CM

## 2017-07-07 DIAGNOSIS — R41.0 ACUTE DELIRIUM: Primary | ICD-10-CM

## 2017-07-07 DIAGNOSIS — G30.0 EARLY ONSET ALZHEIMER'S DISEASE WITH BEHAVIORAL DISTURBANCE (H): ICD-10-CM

## 2017-07-07 LAB
ALBUMIN SERPL-MCNC: 3.9 G/DL (ref 3.4–5)
ALBUMIN UR-MCNC: 10 MG/DL
ALP SERPL-CCNC: 109 U/L (ref 40–150)
ALT SERPL W P-5'-P-CCNC: 29 U/L (ref 0–50)
AMPHETAMINES UR QL SCN: NORMAL
ANION GAP SERPL CALCULATED.3IONS-SCNC: 7 MMOL/L (ref 3–14)
APPEARANCE UR: CLEAR
AST SERPL W P-5'-P-CCNC: 25 U/L (ref 0–45)
BACTERIA SPEC CULT: NORMAL
BACTERIA SPEC CULT: NORMAL
BARBITURATES UR QL: NORMAL
BASOPHILS # BLD AUTO: 0 10E9/L (ref 0–0.2)
BASOPHILS NFR BLD AUTO: 0.4 %
BENZODIAZ UR QL: NORMAL
BILIRUB SERPL-MCNC: 0.4 MG/DL (ref 0.2–1.3)
BILIRUB UR QL STRIP: NEGATIVE
BUN SERPL-MCNC: 31 MG/DL (ref 7–30)
CALCIUM SERPL-MCNC: 9.7 MG/DL (ref 8.5–10.1)
CANNABINOIDS UR QL SCN: NORMAL
CHLORIDE SERPL-SCNC: 106 MMOL/L (ref 94–109)
CO2 SERPL-SCNC: 30 MMOL/L (ref 20–32)
COCAINE UR QL: NORMAL
COLOR UR AUTO: YELLOW
CREAT SERPL-MCNC: 1.06 MG/DL (ref 0.52–1.04)
DIFFERENTIAL METHOD BLD: NORMAL
EOSINOPHIL # BLD AUTO: 0.1 10E9/L (ref 0–0.7)
EOSINOPHIL NFR BLD AUTO: 1 %
ERYTHROCYTE [DISTWIDTH] IN BLOOD BY AUTOMATED COUNT: 13.2 % (ref 10–15)
GFR SERPL CREATININE-BSD FRML MDRD: 53 ML/MIN/1.7M2
GLUCOSE SERPL-MCNC: 91 MG/DL (ref 70–99)
GLUCOSE UR STRIP-MCNC: NEGATIVE MG/DL
HCT VFR BLD AUTO: 45.7 % (ref 35–47)
HGB BLD-MCNC: 15.1 G/DL (ref 11.7–15.7)
HGB UR QL STRIP: NEGATIVE
IMM GRANULOCYTES # BLD: 0 10E9/L (ref 0–0.4)
IMM GRANULOCYTES NFR BLD: 0 %
KETONES UR STRIP-MCNC: NEGATIVE MG/DL
LEUKOCYTE ESTERASE UR QL STRIP: ABNORMAL
LYMPHOCYTES # BLD AUTO: 1.3 10E9/L (ref 0.8–5.3)
LYMPHOCYTES NFR BLD AUTO: 25 %
MCH RBC QN AUTO: 31.1 PG (ref 26.5–33)
MCHC RBC AUTO-ENTMCNC: 33 G/DL (ref 31.5–36.5)
MCV RBC AUTO: 94 FL (ref 78–100)
MICRO REPORT STATUS: NORMAL
MICRO REPORT STATUS: NORMAL
MONOCYTES # BLD AUTO: 0.5 10E9/L (ref 0–1.3)
MONOCYTES NFR BLD AUTO: 9.2 %
MUCOUS THREADS #/AREA URNS LPF: PRESENT /LPF
NEUTROPHILS # BLD AUTO: 3.4 10E9/L (ref 1.6–8.3)
NEUTROPHILS NFR BLD AUTO: 64.4 %
NITRATE UR QL: NEGATIVE
OPIATES UR QL SCN: NORMAL
PCP UR QL SCN: NORMAL
PH UR STRIP: 5.5 PH (ref 5–7)
PLATELET # BLD AUTO: 231 10E9/L (ref 150–450)
POTASSIUM SERPL-SCNC: 4.3 MMOL/L (ref 3.4–5.3)
PROT SERPL-MCNC: 8.1 G/DL (ref 6.8–8.8)
RBC # BLD AUTO: 4.86 10E12/L (ref 3.8–5.2)
RBC #/AREA URNS AUTO: 1 /HPF (ref 0–2)
SODIUM SERPL-SCNC: 143 MMOL/L (ref 133–144)
SP GR UR STRIP: 1.02 (ref 1–1.03)
SPECIMEN SOURCE: NORMAL
SPECIMEN SOURCE: NORMAL
SQUAMOUS #/AREA URNS AUTO: <1 /HPF (ref 0–1)
URN SPEC COLLECT METH UR: ABNORMAL
UROBILINOGEN UR STRIP-MCNC: NORMAL MG/DL (ref 0–2)
WBC # BLD AUTO: 5.2 10E9/L (ref 4–11)
WBC #/AREA URNS AUTO: 3 /HPF (ref 0–2)

## 2017-07-07 PROCEDURE — 85025 COMPLETE CBC W/AUTO DIFF WBC: CPT | Performed by: NURSE PRACTITIONER

## 2017-07-07 PROCEDURE — 87086 URINE CULTURE/COLONY COUNT: CPT | Performed by: NURSE PRACTITIONER

## 2017-07-07 PROCEDURE — 80053 COMPREHEN METABOLIC PANEL: CPT | Performed by: NURSE PRACTITIONER

## 2017-07-07 PROCEDURE — 80307 DRUG TEST PRSMV CHEM ANLYZR: CPT | Performed by: NURSE PRACTITIONER

## 2017-07-07 PROCEDURE — 99285 EMERGENCY DEPT VISIT HI MDM: CPT | Performed by: NURSE PRACTITIONER

## 2017-07-07 PROCEDURE — 99285 EMERGENCY DEPT VISIT HI MDM: CPT | Mod: 25 | Performed by: EMERGENCY MEDICINE

## 2017-07-07 PROCEDURE — 81001 URINALYSIS AUTO W/SCOPE: CPT | Mod: XU | Performed by: NURSE PRACTITIONER

## 2017-07-07 PROCEDURE — 25000132 ZZH RX MED GY IP 250 OP 250 PS 637: Mod: GY | Performed by: NURSE PRACTITIONER

## 2017-07-07 PROCEDURE — 25000132 ZZH RX MED GY IP 250 OP 250 PS 637: Performed by: INTERNAL MEDICINE

## 2017-07-07 PROCEDURE — 70450 CT HEAD/BRAIN W/O DYE: CPT

## 2017-07-07 PROCEDURE — 12000001 ZZH R&B MED SURG/OB UMMC

## 2017-07-07 RX ORDER — QUETIAPINE FUMARATE 25 MG/1
25 TABLET, FILM COATED ORAL AT BEDTIME
Status: DISCONTINUED | OUTPATIENT
Start: 2017-07-07 | End: 2017-07-08

## 2017-07-07 RX ORDER — MEMANTINE HYDROCHLORIDE 5 MG/1
5 TABLET ORAL 2 TIMES DAILY
Status: DISCONTINUED | OUTPATIENT
Start: 2017-07-07 | End: 2017-07-07

## 2017-07-07 RX ORDER — ONDANSETRON 2 MG/ML
4 INJECTION INTRAMUSCULAR; INTRAVENOUS EVERY 6 HOURS PRN
Status: DISCONTINUED | OUTPATIENT
Start: 2017-07-07 | End: 2017-07-18 | Stop reason: HOSPADM

## 2017-07-07 RX ORDER — QUETIAPINE FUMARATE 25 MG/1
25 TABLET, FILM COATED ORAL 2 TIMES DAILY PRN
Status: DISCONTINUED | OUTPATIENT
Start: 2017-07-07 | End: 2017-07-08

## 2017-07-07 RX ORDER — NALOXONE HYDROCHLORIDE 0.4 MG/ML
.1-.4 INJECTION, SOLUTION INTRAMUSCULAR; INTRAVENOUS; SUBCUTANEOUS
Status: DISCONTINUED | OUTPATIENT
Start: 2017-07-07 | End: 2017-07-18 | Stop reason: HOSPADM

## 2017-07-07 RX ORDER — ONDANSETRON 4 MG/1
4 TABLET, ORALLY DISINTEGRATING ORAL EVERY 6 HOURS PRN
Status: DISCONTINUED | OUTPATIENT
Start: 2017-07-07 | End: 2017-07-18 | Stop reason: HOSPADM

## 2017-07-07 RX ADMIN — QUETIAPINE FUMARATE 25 MG: 25 TABLET, FILM COATED ORAL at 22:17

## 2017-07-07 RX ADMIN — QUETIAPINE FUMARATE 12.5 MG: 25 TABLET, FILM COATED ORAL at 17:24

## 2017-07-07 NOTE — IP AVS SNAPSHOT
"    UR 8A: [phone]                                              INTERAGENCY TRANSFER FORM - PHYSICIAN ORDERS   2017                    Hospital Admission Date: 2017  MAVERICK GARZA   : 1960  Sex: Female        Attending Provider: Momo Boswell MD     Allergies:  No Known Allergies    Infection:  None   Service:  HOSPITALIST    Ht:  1.549 m (5' 1\")   Wt:  49.9 kg (109 lb 14.4 oz)   Admission Wt:  50.6 kg (111 lb 9.6 oz)    BMI:  20.77 kg/m 2   BSA:  1.47 m 2            Patient PCP Information     Provider PCP Type    Erika Davey DO General      ED Clinical Impression     Diagnosis Description Comment Added By Time Added    Dementia without behavioral disturbance, unspecified dementia type [F03.90] Dementia without behavioral disturbance, unspecified dementia type [F03.90]  Shakeel Conde MD 2017  9:40 AM    Early onset Alzheimer's disease with behavioral disturbance [G30.0, F02.81] Early onset Alzheimer's disease with behavioral disturbance [G30.0, F02.81]  Shakeel Conde MD 2017  9:41 AM    Slow transit constipation [K59.01] Slow transit constipation [K59.01]  Shakeel Conde MD 2017  9:42 AM      Hospital Problems as of 2017              Priority Class Noted POA    Delirium Medium  2017 Yes      Non-Hospital Problems as of 2017              Priority Class Noted    Prediabetes   2013    Hyperlipidemia LDL goal <130   2013    Hair loss   2013    Advanced directives, counseling/discussion   2014    Dementia without behavioral disturbance, unspecified dementia type Medium  2016      Code Status History     Date Active Date Inactive Code Status Order ID Comments User Context    2017  9:44 AM  DNR/DNI [id number]  Shakeel Conde MD Outpatient    2017  5:14 PM 2017  9:44 AM DNR/DNI [id number]  Be Thurman MD Inpatient    2017  8:24 PM 2017  5:14 PM Full Code " [id number]  Forrest Boyd MD Inpatient         Medication Review      START taking        Dose / Directions Comments    senna-docusate 8.6-50 MG per tablet   Commonly known as:  SENOKOT-S;PERICOLACE   Used for:  Slow transit constipation        Dose:  1 tablet   Take 1 tablet by mouth 2 times daily   Quantity:  100 tablet   Refills:  0        sertraline 25 MG tablet   Commonly known as:  ZOLOFT   Used for:  Dementia without behavioral disturbance, unspecified dementia type        Dose:  12.5 mg   Take 0.5 tablets (12.5 mg) by mouth At Bedtime   Quantity:  30 tablet   Refills:  0          CONTINUE these medications which may have CHANGED, or have new prescriptions. If we are uncertain of the size of tablets/capsules you have at home, strength may be listed as something that might have changed.        Dose / Directions Comments    * QUEtiapine 25 MG tablet   Commonly known as:  SEROQUEL   This may have changed:  additional instructions   Used for:  Early onset Alzheimer's disease with behavioral disturbance        25mg (1 tab) at bedtime   Quantity:  60 tablet   Refills:  3        * QUEtiapine 25 MG tablet   Commonly known as:  SEROquel   This may have changed:  You were already taking a medication with the same name, and this prescription was added. Make sure you understand how and when to take each.   Used for:  Early onset Alzheimer's disease with behavioral disturbance        Dose:  6.25 mg   Take 0.25 tablets (6.25 mg) by mouth 2 times daily (before meals)   Quantity:  60 tablet   Refills:  0        * Notice:  This list has 2 medication(s) that are the same as other medications prescribed for you. Read the directions carefully, and ask your doctor or other care provider to review them with you.      STOP taking     LORazepam 1 MG tablet   Commonly known as:  ATIVAN           memantine 5 MG tablet   Commonly known as:  NAMENDA                   After Care     Activity - Up ad danielito           Advance Diet as  Tolerated       Follow this diet upon discharge: Orders Placed This Encounter      Room Service      Combination Diet Regular Diet Adult       General info for SNF       Length of Stay Estimate: Long Term Care  Condition at Discharge: Declining  Level of care:skilled   Rehabilitation Potential: Fair  Admission H&P remains valid and up-to-date: Yes  Recent Chemotherapy: N/A  Use Nursing Home Standing Orders: Yes       Mantoux instructions       Give two-step Mantoux (PPD) Per Facility Policy Yes             Your next 10 appointments already scheduled     Sep 06, 2017 10:15 AM CDT   [facility]    [address] [address]   [phone]            Oct 23, 2017 10:15 AM CDT   [facility]   [facility]    [address] [address]   [phone]              Statement of Approval     Ordered          07/18/17 0945  I have reviewed and agree with all the recommendations and orders detailed in this document.  EFFECTIVE NOW     Approved and electronically signed by:  Shakeel Conde MD           07/18/17 0944  I have reviewed and agree with all the recommendations and orders detailed in this document.     Approved and electronically signed by:  Shakeel Conde MD

## 2017-07-07 NOTE — IP AVS SNAPSHOT
UR 8A    [address]    [place] [address]    Phone:  [phone]                                       After Visit Summary   7/7/2017    Judith Sifuentes    MRN: [id number]           After Visit Summary Signature Page     I have received my discharge instructions, and my questions have been answered. I have discussed any challenges I see with this plan with the nurse or doctor.    ..........................................................................................................................................  Patient/Patient Representative Signature      ..........................................................................................................................................  Patient Representative Print Name and Relationship to Patient    ..................................................               ................................................  Date                                            Time    ..........................................................................................................................................  Reviewed by Signature/Title    ...................................................              ..............................................  Date                                                            Time

## 2017-07-07 NOTE — IP AVS SNAPSHOT
"    UR 8A: [phone]                                              INTERAGENCY TRANSFER FORM - LAB / IMAGING / EKG / EMG RESULTS   2017                    Hospital Admission Date: 2017  MAVERICK GARZA   : 1960  Sex: Female        Attending Provider: Momo Boswell MD     Allergies:  No Known Allergies    Infection:  None   Service:  HOSPITALIST    Ht:  1.549 m (5' 1\")   Wt:  49.9 kg (109 lb 14.4 oz)   Admission Wt:  50.6 kg (111 lb 9.6 oz)    BMI:  20.77 kg/m 2   BSA:  1.47 m 2            Patient PCP Information     Provider PCP Type    Erika Davey,  General      Unresulted Labs     None      Encounter-Level Documents:     There are no encounter-level documents.      Order-Level Documents:     There are no order-level documents.      "

## 2017-07-07 NOTE — PROGRESS NOTES
Clinic Care Coordination Contact  Care Team Conversations    Sw called and spoke with inpatient care management team member, Fiorella, to provide information on pt who is in the ED.  Fiorella informed this writer that there is no SW referral for the pt at this time.      DAYTON also discussed care options with ED provider, Erika De La Rosa, regarding pt's home situation and possible admission to [facility] due to pt's agitation, behaviors and hallucinations  later in the day, once a bed is secured.    DAYTON again spoke with Fiorella and discussed that pt will most likely be admitted to [facility] as [facility] has declined admission as pt would not meet eligibility for their geripsych unit; discussed options such as [facility], [facility] and [facility] for beds.    Genesis Gibbons  Social Work Care Coordinator  [facility]  [phone]

## 2017-07-07 NOTE — IP AVS SNAPSHOT
"` `           UR 8A: [phone]                                              INTERAGENCY TRANSFER FORM - NURSING   2017                    Hospital Admission Date: 2017  MAVERICK GARZA   : 1960  Sex: Female        Attending Provider: Momo Boswell MD     Allergies:  No Known Allergies    Infection:  None   Service:  HOSPITALIST    Ht:  1.549 m (5' 1\")   Wt:  49.9 kg (109 lb 14.4 oz)   Admission Wt:  50.6 kg (111 lb 9.6 oz)    BMI:  20.77 kg/m 2   BSA:  1.47 m 2            Patient PCP Information     Provider PCP Type    Erika Davey DO General      Current Code Status     Date Active Code Status Order ID Comments User Context       Prior      Code Status History     Date Active Date Inactive Code Status Order ID Comments User Context    2017  9:44 AM  DNR/DNI [id number]  Shakeel Conde MD Outpatient    2017  5:14 PM 2017  9:44 AM DNR/DNI [id number]  Be Thurman MD Inpatient    2017  8:24 PM 2017  5:14 PM Full Code [id number]  Forrest Boyd MD Inpatient      Advance Directives        Does patient have a scanned Advance Directive/ACP document in EPIC?           No        Hospital Problems as of 2017              Priority Class Noted POA    Delirium Medium  2017 Yes      Non-Hospital Problems as of 2017              Priority Class Noted    Prediabetes   2013    Hyperlipidemia LDL goal <130   2013    Hair loss   2013    Advanced directives, counseling/discussion   2014    Dementia without behavioral disturbance, unspecified dementia type Medium  2016      Immunizations     Name Date      Tdap (Adacel,Boostrix) 08          END      ASSESSMENT     Discharge Profile Flowsheet     DISCHARGE NEEDS ASSESSMENT     FINAL RESOURCES      Concerns To Be Addressed  coping/stress concerns;elopement concerns;financial/insurance concerns;home safety concerns 17 0839   Referrals Placed  Community " "Resources;[facility] Resources;Other *** ([facility] ) 07/18/17 0839    Equipment Currently Used at Home  none 07/18/17 0839   SKIN      GASTROINTESTINAL (ADULT,PEDIATRIC,OB)     Inspection  Full 07/17/17 1921    GI WDL  WDL 07/17/17 1921   Skin areas NOT inspected  Spine;Hip, left;Hip, right;Buttock, left;Buttock, right 07/17/17 0005    Last Bowel Movement  07/17/17 07/17/17 1438   Skin WDL  WDL 07/17/17 1921    Passing flatus  yes 07/16/17 1705   SAFETY      COMMUNICATION ASSESSMENT     Safety WDL  WDL 07/18/17 0648    Patient's communication style  spoken language (English or Bilingual) 07/07/17 1012   Safety Factors  bed in low position;call light in reach 07/18/17 0648                 Assessment WDL (Within Defined Limits) Definitions           Safety WDL     Effective: 09/28/15    Row Information: <b>WDL Definition:</b> Bed in low position, wheels locked; call light in reach; upper side rails up x 2; ID band on<br> <font color=\"gray\"><i>Item=AS safety wdl>>List=AS safety wdl>>Version=F14</i></font>      Skin WDL     Effective: 09/28/15    Row Information: <b>WDL Definition:</b> Warm; dry; intact; elastic; without discoloration; pressure points without redness<br> <font color=\"gray\"><i>Item=AS skin wdl>>List=AS skin wdl>>Version=F14</i></font>      Vitals     Vital Signs Flowsheet     VITAL SIGNS     OXYGEN THERAPY      Temp  97.3  F (36.3  C) 07/18/17 0840   SpO2  97 % 07/18/17 0840    Temp src  Oral 07/18/17 0840   O2 Device  None (Room air) 07/18/17 0840    Resp  18 07/18/17 0840   PAIN/COMFORT      Pulse  71 07/18/17 0840   Patient Currently in Pain  denies 07/17/17 1914    Pulse/Heart Rate Source  Monitor 07/18/17 0840   Preferred Pain Scale  CAPA (Clinically Aligned Pain Assessment) ([facility]) 07/16/17 2356    BP  123/65 07/18/17 0840   HEIGHT AND WEIGHT      BP Location  Right arm 07/18/17 0840   Weight  49.9 kg (109 lb 14.4 oz) 07/15/17 0323    LYING ORTHOSTATIC BP     " Weight Method  Standing scale 07/15/17 0323    Lying Orthostatic BP  108/60 07/16/17 1020   POSITIONING      Lying Orthostatic Pulse  64 bpm 07/16/17 1020   Body Position  independently positioning 07/18/17 1021    SITTING ORTHOSTATIC BP     Head of Bed (HOB)  HOB flat 07/18/17 1021    Sitting Orthostatic BP  113/59 07/16/17 0918   Positioning/Transfer Devices  pillows 07/16/17 2356    Sitting Orthostatic Pulse  77 bpm 07/16/17 0918   Chair  Upright in chair 07/16/17 1705    STANDING ORTHOSTATIC BP     DAILY CARE      Standing Orthostatic BP  93/67 07/16/17 0918   Activity Type  ambulated in gerber;ambulated in room;up ad danielito 07/18/17 1021    Standing Orthostatic Pulse  83 bpm 07/16/17 0918   Activity Level of Assistance  independent 07/18/17 1021            Patient Lines/Drains/Airways Status    Active LINES/DRAINS/AIRWAYS     None            Patient Lines/Drains/Airways Status    Active PICC/CVC     None            Intake/Output Detail Report     Date Intake     Output Net    Shift P.O. I.V. IV Piggyback Total Urine Total       Day 07/17/17 0000 - 07/17/17 0659 -- -- -- -- -- -- 0    Shell 07/17/17 0700 - 07/17/17 1459 -- -- -- -- -- -- 0    Noc 07/17/17 1500 - 07/17/17 2359 -- -- -- -- -- -- 0    Day 07/18/17 0000 - 07/18/17 0659 -- -- -- -- -- -- 0    Shell 07/18/17 0700 - 07/18/17 1459 240 -- -- 240 -- -- 240      Last Void/BM       Most Recent Value    Urine Occurrence 1 at 07/17/2017 0940    Stool Occurrence 1 at 07/17/2017 1400      Case Management/Discharge Planning     Case Management/Discharge Planning Flowsheet     ASSESSMENT/CONCERNS TO BE ADDRESSED     Equipment Currently Used at Home  none 07/18/17 0839    Concerns To Be Addressed  coping/stress concerns;elopement concerns;financial/insurance concerns;home safety concerns 07/18/17 0839   Referrals Placed  Community Resources;[facility] Resources;Other *** ([facility] ) 07/18/17 0839    FINAL RESOURCES

## 2017-07-07 NOTE — ED NOTES
Pt in ED on the 1st, with a follow up visit to neurologist yesterday. Has rapidly progressing alzheimer's. Medication changed yesterday. However, family states behaviors are worsening. Pt is becoming violent at home with  and more difficult to control.

## 2017-07-07 NOTE — ED NOTES
Pt continues to have eyes closed during procedures and while staff is in room.   Pt's  signed DECC form.

## 2017-07-07 NOTE — TELEPHONE ENCOUNTER
My opinion would be that it is too soon to say the Seroquel is ineffective, but will have to know more details about what is going on today.     CY

## 2017-07-07 NOTE — IP AVS SNAPSHOT
MRN:[id number]                      After Visit Summary   7/7/2017    Judith Sifuentes    MRN: [id number]           Thank you!     Thank you for choosing [facility] for your care. Our goal is always to provide you with excellent care. Hearing back from our patients is one way we can continue to improve our services. Please take a few minutes to complete the written survey that you may receive in the mail after you visit with us. Thank you!        Patient Information     Date Of Birth          1960        About your hospital stay     You were admitted on:  July 7, 2017 You last received care in the:  UR 8A    You were discharged on:  July 18, 2017       Who to Call     For medical emergencies, please call 911.  For non-urgent questions about your medical care, please call your primary care provider or clinic, [phone]          Attending Provider     Provider Specialty    Shakeel Conde MD Internal Medicine    [facility], MD [facility] Internal Medicine       Primary Care Provider Office Phone # Fax #    Erika Isidra Davey [phone]      After Care Instructions     Activity - Up ad danielito           Advance Diet as Tolerated       Follow this diet upon discharge: Orders Placed This Encounter      Room Service      Combination Diet Regular Diet Adult            General info for SNF       Length of Stay Estimate: Long Term Care  Condition at Discharge: Declining  Level of care:skilled   Rehabilitation Potential: Fair  Admission H&P remains valid and up-to-date: Yes  Recent Chemotherapy: N/A  Use Nursing Home Standing Orders: Yes            Mantoux instructions       Give two-step Mantoux (PPD) Per Facility Policy Yes                  Your next 10 appointments already scheduled     Sep 06, 2017 10:15 AM CDT   [facility]    [address] [address]   [phone]          "   Oct 23, 2017 10:15 AM CDT   [facility]   [facility]    [address]  [place] [facility] [place] [facility] [place] [address] [phone]              Pending Results     No orders found from 2017 to 2017.            Statement of Approval     Ordered          17 0945  I have reviewed and agree with all the recommendations and orders detailed in this document.  EFFECTIVE NOW     Approved and electronically signed by:  Shakeel Conde MD           17 0944  I have reviewed and agree with all the recommendations and orders detailed in this document.     Approved and electronically signed by:  Shakeel Conde MD             Admission Information     Date & Time Provider Department Dept. Phone    2017 Momo Boswell MD UR 8A [phone]      Your Vitals Were     Blood Pressure Pulse Temperature Respirations Weight Last Period    123/65 (BP Location: Right arm) 71 97.3  F (36.3  C) (Oral) 18 49.9 kg (109 lb 14.4 oz) 2013    Pulse Oximetry BMI (Body Mass Index)                97% 20.77 kg/m2          MyChart Information     [facility] lets you send messages to your doctor, view your test results, renew your prescriptions, schedule appointments and more. To sign up, go to [email or web address] . Click on \"Log in\" on the left side of the screen, which will take you to the Welcome page. Then click on \"Sign up Now\" on the right side of the page.     You will be asked to enter the access code listed below, as well as some personal information. Please follow the directions to create your username and password.     Your access code is: [email or web address]  Expires: 2017  3:22 PM     Your access code will  in 90 days. If you need help or a new code, please call your [facility] clinic or [phone].        Care EveryWhere ID     This is your Care EveryWhere ID. This could be used by other organizations to access your [facility] medical " records  [id number]        Equal Access to Services     [facility] MADDIWANDY : Hadii chuckie nicole davi Solluvia, waaxda luqadaha, qaybta kaalmastephanie burdenmartastephanie, ayaan middletonjaisonladan copeland. So [facility] [phone].    ATENCIÓN: Si habla español, tiene a mcclendon disposición servicios gratuitos de asistencia lingüística. Ricoame al [phone].    We comply with applicable federal civil rights laws and [place] laws. We do not discriminate on the basis of race, color, national origin, age, disability sex, sexual orientation or gender identity.               Review of your medicines      START taking        Dose / Directions    senna-docusate 8.6-50 MG per tablet   Commonly known as:  SENOKOT-S;PERICOLACE   Used for:  Slow transit constipation        Dose:  1 tablet   Take 1 tablet by mouth 2 times daily   Quantity:  100 tablet   Refills:  0       sertraline 25 MG tablet   Commonly known as:  ZOLOFT   Used for:  Dementia without behavioral disturbance, unspecified dementia type        Dose:  12.5 mg   Take 0.5 tablets (12.5 mg) by mouth At Bedtime   Quantity:  30 tablet   Refills:  0         CONTINUE these medicines which may have CHANGED, or have new prescriptions. If we are uncertain of the size of tablets/capsules you have at home, strength may be listed as something that might have changed.        Dose / Directions    * QUEtiapine 25 MG tablet   Commonly known as:  SEROQUEL   This may have changed:  additional instructions   Used for:  Early onset Alzheimer's disease with behavioral disturbance        25mg (1 tab) at bedtime   Quantity:  60 tablet   Refills:  3       * QUEtiapine 25 MG tablet   Commonly known as:  SEROquel   This may have changed:  You were already taking a medication with the same name, and this prescription was added. Make sure you understand how and when to take each.   Used for:  Early onset Alzheimer's disease with behavioral disturbance        Dose:  6.25 mg   Take 0.25 tablets (6.25 mg) by mouth 2  times daily (before meals)   Quantity:  60 tablet   Refills:  0       * Notice:  This list has 2 medication(s) that are the same as other medications prescribed for you. Read the directions carefully, and ask your doctor or other care provider to review them with you.      STOP taking     LORazepam 1 MG tablet   Commonly known as:  ATIVAN           memantine 5 MG tablet   Commonly known as:  NAMENDA                Where to get your medicines      Some of these will need a paper prescription and others can be bought over the counter. Ask your nurse if you have questions.     You don't need a prescription for these medications     QUEtiapine 25 MG tablet    QUEtiapine 25 MG tablet    senna-docusate 8.6-50 MG per tablet    sertraline 25 MG tablet                Protect others around you: Learn how to safely use, store and throw away your medicines at www.disposemymeds.org.             Medication List: This is a list of all your medications and when to take them. Check marks below indicate your daily home schedule. Keep this list as a reference.      Medications           Morning Afternoon Evening Bedtime As Needed    * QUEtiapine 25 MG tablet   Commonly known as:  SEROQUEL   25mg (1 tab) at bedtime   Last time this was given:  6.25 mg on 7/18/2017  8:03 AM                                * QUEtiapine 25 MG tablet   Commonly known as:  SEROquel   Take 0.25 tablets (6.25 mg) by mouth 2 times daily (before meals)   Last time this was given:  6.25 mg on 7/18/2017  8:03 AM                                senna-docusate 8.6-50 MG per tablet   Commonly known as:  SENOKOT-S;PERICOLACE   Take 1 tablet by mouth 2 times daily   Last time this was given:  1 tablet on 7/18/2017  8:03 AM                                sertraline 25 MG tablet   Commonly known as:  ZOLOFT   Take 0.5 tablets (12.5 mg) by mouth At Bedtime   Last time this was given:  12.5 mg on 7/17/2017  9:38 PM                                * Notice:  This list has 2  medication(s) that are the same as other medications prescribed for you. Read the directions carefully, and ask your doctor or other care provider to review them with you.

## 2017-07-07 NOTE — TELEPHONE ENCOUNTER
Spoke to Provider in ED. Patient is to be admitted at [place]. Seroquel finally seems to be kicking in.    CY

## 2017-07-07 NOTE — CONSULTS
Received referral from ER to assist pt and family to find pt geriatric placement. Reviewed pt's chart. Seems pt is having difficulties at home. Family is unable to care for pt. Per notes, it seems to be unsafe for pt to return home at this time. Checked [place] [facility] on-line. Seems not many beds available at this time. Called [facility]; [facility] [place]  #[phone] and faxed referral to #[phone]. Await c/b to see if able to take pt. CANDICE Jeong

## 2017-07-07 NOTE — IP AVS SNAPSHOT
` ` Patient Information     Patient Name Sex     Still Judith Garza ([id number]) Female 1960       Room Bed    [address] [place] [address]      Patient Demographics     Address Phone    [address] [place] [address] [phone] (Home) *Preferred*  None (Work)  [phone] (Mobile)      Patient Ethnicity & Race     Ethnic Group Patient Race    American White      Emergency Contact(s)     Name Relation Home Work Mobile    LIDIA GARZA Spouse [phone] NONE [phone]    JOSE CORDOVA Father [phone] NONE NONE    EATEN,MINDY Friend [phone] NONE NONE      Documents on File        Status Date Received Description       Documents for the Patient    Insurance Card  () 08     Privacy Notice - [place]  () 08     Consent Form  08     Face Sheet  08     External Medication Information Consent Accepted 13     Patient ID Received () 13 [place] Drivers [place]    Consent for Services - Hospital/Clinic Received () 13     Insurance Card Received () 11     Privacy Notice - [place] Received 11     Consent for Services - Hospital/Clinic Received () 11     [facility] for Patient Signature       Consent for EHR Access  13 Copied from existing Consent for services - C/HOD collected on 2011    HIM RISA Authorization - File Only   [facility] 13    [facility] Specified Other       Insurance Card Received () 13 medica [place]    HIM RISA Authorization  14 AI Records    HIM RISA Authorization  () 14 ai records    HIM RISA Authorization  () 14 ai records    HIM RISA Authorization  () 14 ai records    Consent for Services/Privacy Notice - Hospital/Clinic Received () 16     Consent to Communicate  16 AUTHORIZATION TO DISCUSS PROTECTED HEALTH INFORMATION 16    Consent for Services - [facility]       Consent for  Services/Privacy Notice - Hospital/Clinic-Esign Received 03/10/17     HIM RISA Authorization  16 DDS    HIM RISA Authorization  () 16 DDS - ALL [facility] SITES COVERED    Insurance Card Received () 16 [employer]    Consent to Communicate  06/10/16 AUTHORIZATION TO DISCUSS PROTECTED HEALTH INFORMATION    Insurance Card Received () 17 medicare    Insurance Card Received 17 medica    Insurance Card Received 03/10/17 medicare    HIM RISA Authorization - File Only  17 DEC RELEASE OF INFORMATION    CE Prospective Auth Received () 16 Authorization Document from [employer]    System-Retrieved CE Auth Form Received () 16 External Authorization Form from Social Security Administration       Documents for the Encounter    CMS IM for Patient Signature Received 17     Code Blue/Rapid Response  17 RAPID RESPONSE TEAM CALL RECORD      Admission Information     Attending Provider Admitting Provider Admission Type Admission Date/Time    Momo Boswell MD Momo rabago MD Urgent 17  1947    Discharge Date Hospital Service Auth/Cert Status Service Area     Hospitalist Incomplete [facility] SERVICES    Unit Room/Bed Admission Status       UR 8A 801/801- Admission (Confirmed)       Admission     Complaint    acute delirium, chronic dementia, Delirium      Hospital Account     Name Acct ID Class Status Primary Coverage    Still Judith Sifuentes [id number] Inpatient Open MEDICA - [facility] VANTAGE FULLY INS WITH MEDICA            Guarantor Account (for Hospital Account #[id number])     Name Relation to Pt Service Area Active? Acct Type    Judith Red  FCS Yes Personal/Family    Address Phone          [address] [place] [address] [phone](H)              Coverage Information (for Hospital Account #[id number])     1. MEDICA/[facility] VANTAGE FULLY INS WITH  MEDICA     F/O Payor/Plan Precert #    MEDICA/[facility] AND [facility] VANTA FULLY INS WITH MEDICA     Subscriber Subscriber #    Jon Sifuentes [id number]    Address Phone    [address] [address]  [place] [address] [phone]          2. MEDICARE/MEDICARE     F/O Payor/Plan Precert #    MEDICARE/MEDICARE     Subscriber Subscriber #    Judith Red [id number]    Address Phone    [employer] CLAIMS  [address]  [place] [address] [phone]

## 2017-07-07 NOTE — IP AVS SNAPSHOT
` `     UR 8A: [phone]                 INTERAGENCY TRANSFER FORM - NOTES (H&P, Discharge Summary, Consults, Procedures, Therapies)   2017                    Hospital Admission Date: 2017  MAVERICK GARZA   : 1960  Sex: Female        Patient PCP Information     Provider PCP Type    Erika Davey DO General         History & Physicals      H&P by Forrest Boyd MD at 2017 10:09 PM     Author:  Forrest Boyd MD Service:  Internal Medicine Author Type:  Fellow    Filed:  2017 10:09 PM Date of Service:  2017 10:09 PM Creation Time:  2017  9:57 PM    Status:  Attested :  Forrest Boyd MD (Fellow)    Cosigner:  Shakeel Conde MD at 2017 10:08 AM        Attestation signed by Shakeel Conde MD at 2017 10:08 AM        Pt seen, case reviewed with ER team and Dr Daugherty                                 H&P         Assessment and Plan:   55 yo woman with likely rapidly-progressive early onset A;zheimer's dementia. She continues to decline and had a recent episode of uncontrollable agitation leading to recurrent ED visits.    Continue therapy per Neurology's last note 2017  - Continue the Namenda 5mg twice daily.   - Stop the Aricept. Stop the Ativan.   - Start Seroquel: 25mg at bedtime. 1/2 tab during the day as needed.   - Neurology consult in the am  - 1:1 raghav Boyd MD  [facility]  [facility]        HPI:   55 yo woman with likely rapidly-progressive early onset A;zheimer's dementia. She continues to decline and had a recent episode of uncontrollable agitation leading to recurrent ED visits. She was brought to the [facility], for medical management prior to possible transfer to Geropsychiatry.     The patient is not able to provide quality history. She does not where she is, where she lives except for she is here to get better. No family is present at the bedside.    History is summarized from the  chart.    ED notes:   reports the last week has gotten progressively worse.  He reports he has safety proofed the house to try to protect his wife and he is sleeping in front of the door to prevent her from escaping.   reports she is most agitated from 0300 to noon.  He reports he is completely exhausted and cannot take her home.  Yesterday Judith's medications were adjusted and her lorazepam was discontinued and her donzepil was discontinued and she was started on Seroquel 25 mg po qhs and okay to take 1/2 tablet PO 2-3 times during the day for agitation.  She is still taking the namenda 5 mg po bid.  She seems to be eating okay and voiding and stooling okay.  He reports last night was the worst night ever and she was hitting him and biting and kicking him in attempts to help the boy in her hallucinations.  Spoke with  regarding wife and he verbally states he has guardianship of patient - Judith Sifuentes.      Neurology appointment: 7/6/2017  55 yo woman with likely rapidly-progressive early onset A;zheimer's dementia. She continues to decline and had a recent episode of uncontrollable agitation leading to ED visits and new hallucinations. The UTI may have been at least in part the cause, but it is clear that the patient continue to decline as would be expected with her neurodegenerative disease. The family is working with our  with regards to help at home. From a neuropsychiatric standpoint, the patient has not really needed treatment in the past and I advised them the benzodiazepines are not the solution for the long-term. I would like to stop the Aricept and start Seroquel (I would be worried about concurrent use of these with potential for QT prolongation). We will continue the Namenda.      Phone numbers for the Nurse Triage line were given to the patient's family as well as some information regarding [facility] resources. I agree that it is time for the  to  get some respite. He is considering placement in the future so we also briefly discussed this. Keeping her at home is desired for now. I encouraged them to keep up the good work with the music and activities to help stimulate Judit. Hopefully the Seroquel will help to normalize her sleep/wake cycle and calm her during the day. I asked them to let us know if they need any additional help. The patient's family understands and agrees with the plan.     Plan from visit:   Continue the Namenda 5mg twice daily.   Stop the Aricept. Stop the Ativan.   Start Seroquel: 25mg at bedtime. 1/2 tab during the day as needed. *Information provided.         Past Medical History:     Past Medical History:   Diagnosis Date     Spinal puncture headache 5/24/2016            Past Surgical History:   No past surgical history on file.         Social History:     Social History     Social History     Marital status:      Spouse name: N/A     Number of children: N/A     Years of education: N/A     Occupational History     Not on file.     Social History Main Topics     Smoking status: Never Smoker     Smokeless tobacco: Never Used     Alcohol use Yes      Comment: rare     Drug use: No     Sexual activity: Yes     Partners: Male     Other Topics Concern     Parent/Sibling W/ Cabg, Mi Or Angioplasty Before 65f 55m? No     Social History Narrative            Family History:     Family History   Problem Relation Age of Onset     DIABETES Mother      Hypertension Mother      DIABETES Father      Hypertension Maternal Grandmother      DIABETES Maternal Grandmother      Thyroid Disease Daughter             Allergies:    No Known Allergies         Medications:     Current Facility-Administered Medications on File Prior to Encounter:  [COMPLETED] QUEtiapine (SEROquel) half-tab 12.5 mg     Current Outpatient Prescriptions on File Prior to Encounter:  QUEtiapine (SEROQUEL) 25 MG tablet 25mg (1 tab) at bedtime. Okay to give 1/2 tab 2-3 times  during the day as needed.   LORazepam (ATIVAN) 1 MG tablet Take 0.5-1 tablets (0.5-1 mg) by mouth every 6 hours as needed for other (aggitation)   memantine (NAMENDA) 5 MG tablet Take 1 tab in the morning and 2 tabs (10mg) at night for one week and then increase to 2 tabs (10mg ) twice daily. (Patient taking differently: Take 5 mg by mouth 2 times daily Take 1 tab in the morning and 2 tabs (10mg) at night for one week and then increase to 2 tabs (10mg ) twice daily.)            Review of Systems:   Confused cannot provide appropriate review of systems.       Physical Exam:   Temp:  [97.9  F (36.6  C)-98.1  F (36.7  C)] 97.9  F (36.6  C)  Pulse:  [61-83] 83  Heart Rate:  [68-72] 72  Resp:  [16-18] 18  BP: (110-131)/(68-89) 110/68  SpO2:  [97 %-99 %] 97 %  GENERAL APPEARANCE: healthy, alert and no distress  EYES: Eyes grossly normal to inspection, and conjunctivae and sclerae normal  HEENT: No adenopathy  NECK: no adenopathy, no asymmetry, masses, or scars and thyroid normal to palpation  RESP: lungs clear to auscultation - no rales, rhonchi or wheezes  CV: regular rates and rhythm, normal S1 S2, no S3 or S4  ABDOMEN: soft, nontender, no hepatosplenomegaly, no masses and bowel sounds normal         Data:   CMP  Recent Labs  Lab 07/07/17  1116 07/01/17  1430    141   POTASSIUM 4.3 3.7   CHLORIDE 106 106   CO2 30 30   ANIONGAP 7 5   GLC 91 106*   BUN 31* 25   CR 1.06* 0.77   GFRESTIMATED 53* 77   GFRESTBLACK 64 >90African American GFR Calc   JOSETTE 9.7 9.2   PROTTOTAL 8.1 8.1   ALBUMIN 3.9 4.0   BILITOTAL 0.4 0.3   ALKPHOS 109 110   AST 25 19   ALT 29 28     CBC  Recent Labs  Lab 07/07/17  1116 07/01/17  1430   WBC 5.2 7.9   RBC 4.86 4.51   HGB 15.1 14.1   HCT 45.7 42.7   MCV 94 95   MCH 31.1 31.3   MCHC 33.0 33.0   RDW 13.2 13.4    206     INRNo lab results found in last 7 days.[BK1.1]          Revision History        User Key Date/Time User Provider Type Action    > BK1.1 7/7/2017 10:09 PM Forrest Boyd,  MD Fellow Sign                  Discharge Summaries     No notes of this type exist for this encounter.         Consult Notes      Consults by Tony Saldana MD at 7/12/2017  2:50 PM     Author:  Tony Saldana MD Service:  Psychiatry Author Type:  Physician    Filed:  7/12/2017  3:00 PM Date of Service:  7/12/2017  2:50 PM Creation Time:  7/12/2017  2:48 PM    Status:  Signed :  Tony Saldana MD (Physician)     Consult Orders:    1. Psychiatry IP Consult: dementia, agitated behaviors, depressed.; Consultant may enter orders: Yes; Patient to be seen: Routine; Call back #: [phone] [[id number]] ordered by Be Thurman MD at 07/12/17 0848                Psychiatry    Chart reviewed, patient interviewed, discussed with Dr. Mcneil.    Impression: Early onset rapidly progressive Alzheimer's Disease with agitation and mood symptoms.[RW1.1] Concerns about syncope and falls.[RW1.2]    Recommendations:  1.[RW1.1]  Consider replacing Seroquel with low dose Zyprexa if orthostatics and falls are a consideration. Would try 1.25 mg qhs and q 4-6 hrs prn to start and adjust as indicated.  2.  Zoloft may be helpful for depression and irritability.  Consider starting at 12.5 mg to 25 mg hs and titrate by 12.5 to 25mg per day q 2-3days to an initial dose of 50 mg and monitor.  Can go up if needed.  3.  Dictation to follow, call with questions.    Tony Saldana MD  [phone][RW1.2]     Revision History        User Key Date/Time User Provider Type Action    > RW1.2 7/12/2017  3:00 PM Tony Saldana MD Physician Sign     RW1.1 7/12/2017  2:48 PM Tony Saldana MD Physician                      Progress Notes - Physician (Notes from 07/15/17 through 07/18/17)      Progress Notes by Madhuri Gann LSW at 7/18/2017  9:42 AM     Author:  Madhuri Gann LSW Service:  Social Work Author Type:      Filed:  7/18/2017 10:44 AM Date of Service:  7/18/2017  9:42 AM Creation Time:  7/18/2017  9:42 AM    Status:   Signed :  Madhuri Gann LSW ()         Social Work Services Discharge Note      Patient Name:  Judith Sifuentes     Anticipated Discharge Date:  7/18/17    Discharge Disposition:   LTC:  [facility][RB1.1]    (Ph: [phone] F: [phone])[RB1.2]    Following MD:  Erika Davey      Pre-Admission Screening (PAS) online form has been completed.  The Level of Care (LOC) is:  Determined  Confirmation Code is:[RB1.1]  [facility] facility so PAS is not required according to Senior linkage line[RB1.3]   Patient/caregiver informed of referral to Senior Linkage Line for Pre-Admission Screening for skilled nursing facility (SNF) placement and to expect a phone call post discharge from SNF.     Additional Services/Equipment Arranged:  Pt's  Jon will be providing transportation to patient to SNF.      Patient / Family response to discharge plan:  Patients  and decision maker, Jon is in agreement with discharge plan.      Persons notified of above discharge plan:  Patients , medical team, [facility]     Staff Discharge Instructions:  Please fax discharge orders and signed hard scripts for any controlled substances.  Please print a packet and send with patient.     CTS Handoff completed:  YES    Medicare Notice of Rights provided to the patient/family:[RB1.1]  NO[RB1.2]    CANDICE Washington  Float , Coverage 8A  PH: [phone]  Pgr: [phone][RB1.1]      Additional info:     DAYTON did speak with Angella at [facility] to provide update that patient will not be coming to this facility. (Cell: [phone])[RB1.4]     Revision History        User Key Date/Time User Provider Type Action    > RB1.2 7/18/2017 10:44 AM Madhuri Gann LSW  Sign     RB1.3 7/18/2017 10:34 AM Madhuri Gann LSW       RB1.4 7/18/2017  9:50 AM Madhuri Gann LSW       RB1.1 7/18/2017  9:42 AM Azeem  CANDICE Dhaliwal              Progress Notes by Madhuri Gann LSW at 7/17/2017 10:54 AM     Author:  Madhuri Gann LSW Service:  Social Work Author Type:      Filed:  7/18/2017  9:00 AM Date of Service:  7/17/2017 10:54 AM Creation Time:  7/17/2017 10:54 AM    Status:  Signed :  Madhuri Gann LSW ()         Social Work Services Progress Note[RB1.1]    Hospital Day: 11[RB1.2]  Date of Initial Social Work Evaluation:  7/8/17  Collaborated with:  Patients , Dr. Conde, [facility]     Data:    Patient is a 56 year old female who admitted on 7/7/17. Patient is medically ready to DC.     Intervention:    SW continues to follow patient for assistance with DC planning. SW received a phone call from Angella[RB1.1] Dulce Maria[RB1.3] (Cell:[phone]) Jon who states that pt cannot come home as family is unable to to take care of her, so they are agreeable to pay for placement if necessary until MA is approved [phone]) at Pending [facility] to [facility] with update that the cooperate team granted approval for patient to admit to the facility even though patient has been on 1:1 d/t wandering. Angella requested that DAYTON send copy of MA application as facility needs to determine payment for patient prior to pt being admitted. Per request DAYTON did fax a copy of pt's MA application to [facility] (Fax: [phone]). Jon asher states that pt cannot come home as family is unable to to take care of her, so they are agreeable to pay for placement if necessary until MA is approved[RB1.1]. Angella stated that patient's  would be required to pay $6000 down payment but Angella noted that if this is a huge financial hardship then [facility] memory care unit would be willing to look into lowering the amount for the down payment. Pt's  wants to complete paperwork for [facility] before discussing this facility further. [facility] would be able to  take patient upon payment.[RB1.3]     SW also spoke with pt's  Jon to provide update on the acceptance at [facility]. Pt's  Jon said that he would prefer patient DC to [place] at [facility]. Jon stated that he had spoken with Sigrid (Ph: [phone]) at [facility] who reported that patient was accepted to facility but that Facility and financial paperwork needs to be completed by patients  prior to acceptance at facility. Pt's  planning to meet with admissions coordinator, Sigrid at [facility] to complete paperwork today. Once paperwork is processed they can look at admitting her. This SW did fax [phone] [facility] pt's MA application. Sigrid reported that they could take patient on Thursday, however SW expressed that pt is medically ready to DC so Sigrid reported that once paperwork is turned in by pt's  then facility can[RB1.1] admit patient. Patient has been accepted medically. Pt's , Jon is attempting to complete paperwork by tomorrow.[RB1.3]     [facility]-  (Ph: [phone] F: [phone])  [facility]  [place]: (Ph: [phone] F: [phone])     Assessment:  Patient is not appropriate to assess at this time due to dementia. SW did speak with pt's  Jon who appeared open to speaking with SW[RB1.1]. Pt's  expressed desire for patient to go to the memory care unit at [facility] in [place]. Pt appeared overwhelmed with trying to complete paperwork required for [facility] and noted that he was working to have it completed by tomorrow.[RB1.3]     Plan:    Anticipated Disposition:[RB1.1]  Facility:  [facility][RB1.3]    Barriers to d/c plan:[RB1.1]  Facility has accepted patient medically but is awaiting pt's  to complete paperwork.[RB1.3]     Follow Up:[RB1.1]  SW will continue to follow for  discharge planning and provide support as needed.     CANDICE Washington  Float , Coverage 8A  PH: [phone]  Pgr: [phone][RB1.3]         Revision History        User Key Date/Time User Provider Type Action    > RB1.3 7/18/2017  9:00 AM Madhuri Gann LSW  Sign     RB1.2 7/17/2017 11:57 AM Madhuri Gann LSW       RB1.1 7/17/2017 10:54 AM Madhuri Gann LSW              Progress Notes by Shakeel Conde MD at 7/18/2017  8:15 AM     Author:  Shakeel Conde MD Service:  Internal Medicine Author Type:  Physician    Filed:  7/18/2017  8:18 AM Date of Service:  7/18/2017  8:15 AM Creation Time:  7/18/2017  8:15 AM    Status:  Signed :  Shakeel Conde MD (Physician)         Pt seen, case reviewed with team    There have been no interval medical or behavioral issues  There have been no episodes of agitation  Pt has been ambulating steadily on unit alone    VSS  BP 90s-130s/  Alert, oriented to person, pleasant  No tremor or rigidity  Lungs clear  CV rrr  Abd soft  No LE edema      Assessment    Dementia with agitation prior to admission, stable on Zoloft and seroquel.    Episode of near syncope following admission, no recurrence.  BP stable    Plan  D/c to memory care[DS1.1]       Revision History        User Key Date/Time User Provider Type Action    > DS1.1 7/18/2017  8:18 AM Shakeel Conde MD Physician Sign            Progress Notes by Shakeel Conde MD at 7/17/2017 10:36 AM     Author:  Shakeel Conde MD Service:  Internal Medicine Author Type:  Physician    Filed:  7/17/2017 10:46 AM Date of Service:  7/17/2017 10:36 AM Creation Time:  7/17/2017 10:36 AM    Status:  Signed :  Shakeel Conde MD (Physician)         Pt seen, case reviewed with team    Pt remains medically stable  Sitter d/chantal this am, as Pt's behaviors have been stable. She is walking about the unit steadily, without assistance, is pleasant,  "redirectable    VSS  BP 90s-140s/  Alert, pleasant, oriented to self  Lungs clear  CV rrr  Abd soft  No LE edema      Assessment    Dementia, with agitation, resistiveness, improved with seroquel  Behaviors stable.  Pt is appropriate for memory care unit    Plan  D/c when placement can be found, hopefully today[DS1.1]     Revision History        User Key Date/Time User Provider Type Action    > DS1.1 7/17/2017 10:46 AM Shakeel Conde MD Physician Sign            Progress Notes by Be Thurman MD at 7/16/2017  1:02 PM     Author:  Be Thurman MD Service:  Internal Medicine Author Type:  Physician    Filed:  7/16/2017  1:09 PM Date of Service:  7/16/2017  1:02 PM Creation Time:  7/16/2017  1:02 PM    Status:  Signed :  Be Thurman MD (Physician)         [facility] Internal Medicine Progress Note            Interval History:   Record reviewed.  Seen with RN.  On seroquel 25 mg HS and 6.25 mg BID.  Continued 1:1 sitter.  Intermittent agitation per record with \"argumentative' conversation with herself.  Redirectable. Generally pleasant.  In bed resting when seen.  Denied pain, physical concerns.  Tolerating po.  Limited intake.  BM this AM.  Standing BP 93/67.              Medications:   All medications reviewed today          Physical Exam:   Blood pressure 124/48, pulse 68, temperature 99.1  F (37.3  C), temperature source Oral, resp. rate 18, weight 49.9 kg (109 lb 14.4 oz), last menstrual period 09/18/2013, SpO2 95 %.    Intake/Output Summary (Last 24 hours) at 07/10/17 1033  Last data filed at 07/09/17 1330   Gross per 24 hour   Intake              180 ml   Output                0 ml   Net              180 ml          General:  Alert when seen.  Confused.  Pleasant.  No distress.  Observed ambulating without instability.     Heent:      Neck:    Skin:    Chest:  clear    Cardiac:  Reg without gallop, murmur.    Abdomen:  Non distended, soft, non tender.  BS " normal.    Extremities:  Perfused.  No calf, thigh swelling, discomfort, induration.     Neuro:            Data:     No results found for this or any previous visit (from the past 24 hour(s)).  UC mixed katlyn.          Assessment and Plan:   1)  Rapidly progressive early onset Alzheimer's dementia with continued periods where more anxious with tendency to wander.  Slow titration upward on seroquel after repeat review with psychiatry. Orthostatic BP drop without symptoms.  Suspect potential component of vloume depletion.   2)  Syncope subsequent to admission (possible decrease volume/seroquel, possible vasovagal per record)   No recurrence.  BP as nahomi.   3)  Gait instability. Improved stability.     PLAN:  1)  Encourage diet/po.  2)  Mobilize as tolerated with assist.  3)   Same seroquel.  Gradual titration upward as clinically warranted and BP allows - however no significant agitation/behaviors that are not redirectable.   4) Repeat  IV NS bolus,  5)  Discussed with SW frustrating issue that if on locked memory care unit periods of anxiety/wondering could be managed by staff without meds - but to get there requires to be off sitter (difficult in this setting on med surg unit) and on meds to blunt behaviors.  6)  Review with  7/14.  Made DNR/DNI.   Disposition Plan   Expected discharge not determined memory care unit once appropriate facility found and funding in place.      Entered: Be Thurman 07/16/2017, 1:02 PM              Attestation:  I have reviewed today's vital signs, notes, medications, labs and imaging.     Be Thurman MD[WR1.1]             Revision History        User Key Date/Time User Provider Type Action    > WR1.1 7/16/2017  1:09 PM Be Thurman MD Physician Sign            Progress Notes by Be Thurman MD at 7/15/2017 11:38 AM     Author:  Be Thurman MD Service:  Internal Medicine Author Type:  Physician    Filed:  7/15/2017 11:48 AM Date of  "Service:  7/15/2017 11:38 AM Creation Time:  7/15/2017 11:38 AM    Status:  Signed :  Be Thurman MD (Physician)         [facility] Internal Medicine Progress Note            Interval History:   Record reviewed.  Seen with RN.  On seroquel 25 mg HS and 6.25 mg BID.  Continued 1:1 sitter.  Persistent periods of \"anxiety\" with wandering.  Redirectable. Generally pleasant.  In bed resting when seen.  Denied pain, physical concerns.  Tolerating po.  BM 7/14.  Standing BP 96/64.  Sitting 106 systolic.             Medications:   All medications reviewed today          Physical Exam:   Blood pressure 128/53, pulse 60, temperature 99.6  F (37.6  C), temperature source Oral, resp. rate 16, weight 49.9 kg (109 lb 14.4 oz), last menstrual period 09/18/2013, SpO2 96 %.    Intake/Output Summary (Last 24 hours) at 07/10/17 1033  Last data filed at 07/09/17 1330   Gross per 24 hour   Intake              180 ml   Output                0 ml   Net              180 ml   I/O this shift:  In: 480 [P.O.:480]  Out: -       General:  Alert when seen.  Confused.  Pleasant.  No distress.     Heent:      Neck:    Skin:    Chest:  clear    Cardiac:  Reg without gallop, murmur.    Abdomen:  Non distended, soft, non tender.  BS normal.    Extremities:  Perfused.  No calf, thigh swelling, discomfort, induration.     Neuro:            Data:     No results found for this or any previous visit (from the past 24 hour(s)).  UC mixed katlyn.          Assessment and Plan:   1)  Rapidly progressive early onset Alzheimer's dementia with continued periods where more anxious with tendency to wander.  Slow titration upward on seroquel after repeat review with psychiatry. Orthostatic BP drop as above.  Suspect potential component of vloume depletion.   2)  Syncope subsequent to admission (possible decrease volume/seroquel, possible vasovagal per record)   No recurrence.  BP as nahomi.   3)  Gait instability. Improved stability.     PLAN:  " 1)  Encourage diet/po.  2)  Mobilize as tolerated with assist.  3)   Same seroquel.  Gradual titration upward as clinically warranted and BP allows.  4)  IV NS bolus,  5)  Discussed with SW frustrating issue that if on locked memory care unit periods of anxiety/wondering could be managed by staff without meds - but to get there requires to be off sitter (difficult in this setting on med surg unit) and meds not likely necessary in more spervised/closed environment.  6)  Review with  7/14.  Made DNR/DNI.   Disposition Plan   Expected discharge not determined memory care unit once appropriate facility found and funding in place.      Entered: Be Thurman 07/15/2017, 11:38 AM              Attestation:  I have reviewed today's vital signs, notes, medications, labs and imaging.     Be Thurman MD[WR1.1]             Revision History        User Key Date/Time User Provider Type Action    > WR1.1 7/15/2017 11:48 AM Be Thurman MD Physician Sign                  Procedure Notes     No notes of this type exist for this encounter.      Progress Notes - Therapies (Notes from 07/15/17 through 07/18/17)     No notes of this type exist for this encounter.
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
## 2017-07-07 NOTE — TELEPHONE ENCOUNTER
Call returned. In  [facility] right now.    Advised I would FYI Dr Russell.  Cherelle Cobian RN  [facility] [place] [place] [facility]

## 2017-07-07 NOTE — LETTER
Transition Communication Hand-off for Care Transitions to Next Level of Care Provider    Name: Judith Sifuentes  MRN #: [id number]  Primary Care Provider: Erika Davey     Primary Clinic: [address] [address]     Reason for Hospitalization:  acute delirium  chronic dementia  Delirium  Admit Date/Time: 7/7/2017  7:45 PM  Discharge Date: 7/18/17  Payor Source: Payor: MEDICA / Plan: [facility] FULLY INS WITH MEDICA / Product Type: Indemnity /     Plan of Care Goals/Milestone Events:   Patient Goals:   Long-term: Pt's husbands goal is for patient to DC to SNF memory care unit to provide  ongoing 24/7 care to patient.            Reason for Communication Hand-off Referral: Fragility    Discharge Plan:  Patient will discharge to [facility] in [place]      Concern for non-adherence with plan of care:   Y/N no   Follow-up plan:  Future Appointments  Date Time Provider Department Center   9/6/2017 10:15 AM Esme Russell MD [facility]   10/23/2017 10:15 AM Esme Russell MD [facility]       [facility] Recommendations:  It is recommended that patient have 24 hour caregivers. Patients family has decided to have patient move to SNF memory care unit.     CANDICE Washington  [facility]

## 2017-07-07 NOTE — IP AVS SNAPSHOT
` `      8A: [phone]            Medication Administration Report for Judith Red as of 07/18/17 1045   Legend:    Given Hold Not Given Due Canceled Entry Other Actions    Time Time (Time) Time  Time-Action       Inactive    Active    Linked        Medications 07/12/17 07/13/17 07/14/17 07/15/17 07/16/17 07/17/17 07/18/17    naloxone (NARCAN) injection 0.1-0.4 mg  Dose: 0.1-0.4 mg Freq: EVERY 2 MIN PRN Route: IV  PRN Reason: opioid reversal  Start: 07/07/17 2013   Admin Instructions: For respiratory rate LESS than or EQUAL to 8.  Partial reversal dose:  0.1 mg titrated q 2 minutes for Analgesia Side Effects Monitoring Sedation Level of 3 (frequently drowsy, arousable, drifts to sleep during conversation).Full reversal dose:  0.4 mg bolus for Analgesia Side Effects Monitoring Sedation Level of 4 (somnolent, minimal or no response to stimulation).               ondansetron (ZOFRAN-ODT) ODT tab 4 mg  Dose: 4 mg Freq: EVERY 6 HOURS PRN Route: PO  PRN Reasons: nausea,vomiting  Start: 07/07/17 2024   Admin Instructions: This is Step 1 of nausea and vomiting management.  If nausea not resolved in 15 minutes, go to Step 2 prochlorperazine (COMPAZINE). Do not push through foil backing. Peel back foil and gently remove. Place on tongue immediately. Administration with liquid unnecessary              Or  ondansetron (ZOFRAN) injection 4 mg  Dose: 4 mg Freq: EVERY 6 HOURS PRN Route: IV  PRN Reasons: nausea,vomiting  Start: 07/07/17 2024   Admin Instructions: This is Step 1 of nausea and vomiting management.  If nausea not resolved in 15 minutes, go to Step 2 prochlorperazine (COMPAZINE).  Irritant.               QUEtiapine (SEROquel) quarter-tab 6.25 mg  Dose: 6.25 mg Freq: 2 TIMES DAILY BEFORE MEALS Route: PO  Start: 07/11/17 1000   Admin Instructions: Hold and notify prn excess sedation.     0802 (6.25 mg)-Given       1645 (6.25 mg)-Given        0905 (6.25 mg)-Given       1558 (6.25 mg)-Given        0921  (6.25 mg)-Given       1501 (6.25 mg)-Given [C]       (1537)-Not Given [C]        0942 (6.25 mg)-Given       2055 (6.25 mg)-Given        0817 (6.25 mg)-Given       1630 (6.25 mg)-Given        0740 (6.25 mg)-Given       1626 (6.25 mg)-Given        0803 (6.25 mg)-Given       [ ] 1630           QUEtiapine (SEROquel) tablet 25 mg  Dose: 25 mg Freq: AT BEDTIME Route: PO  Start: 07/14/17 2200      2152 (25 mg)-Given         0017 (25 mg)-Given       2134 (25 mg)-Given        2139 (25 mg)-Given        [ ] 2200           senna-docusate (SENOKOT-S;PERICOLACE) 8.6-50 MG per tablet 1 tablet  Dose: 1 tablet Freq: 2 TIMES DAILY Route: PO  Start: 07/10/17 1100   Admin Instructions: Hold prn loose BM     0802 (1 tablet)-Given       2147 (1 tablet)-Given        (0906)-Not Given       2026 (1 tablet)-Given        0921 (1 tablet)-Given       (2000)-Not Given [C]        (0919)-Not Given       (2058)-Not Given        0817 (1 tablet)-Given       2134 (1 tablet)-Given        0741 (1 tablet)-Given       (2126)-Not Given [C]        0803 (1 tablet)-Given       [ ] 2000           sertraline (ZOLOFT) half-tab 12.5 mg  Dose: 12.5 mg Freq: AT BEDTIME Route: PO  Start: 07/12/17 2200    2147 (12.5 mg)-Given        2137 (12.5 mg)-Given        2151 (12.5 mg)-Given         0015 (12.5 mg)-Given       2134 (12.5 mg)-Given        2138 (12.5 mg)-Given        [ ] 2200          Completed Medications  Medications 07/12/17 07/13/17 07/14/17 07/15/17 07/16/17 07/17/17 07/18/17         Dose: 500 mL Freq: ONCE Route: IV  Last Dose: 500 mL (07/16/17 1328)  Start: 07/16/17 1315   End: 07/16/17 1528        1328 (500 mL)-New Bag               Dose: 500 mL Freq: ONCE Route: IV  Last Dose: 500 mL (07/15/17 1356)  Start: 07/15/17 1200   End: 07/15/17 1556       1356 (500 mL)-New Bag [C]             Discontinued Medications  Medications 07/12/17 07/13/17 07/14/17 07/15/17 07/16/17 07/17/17 07/18/17         Start: 07/08/17 0414   End: 07/17/17 1010   Admin Instructions:  Brandee Cifuentes : cabinet override          1010-Med Discontinued

## 2017-07-07 NOTE — TELEPHONE ENCOUNTER
Reason for Call:  Other call back    Detailed comments: pt  calling stating pt was seen yesterday and she is now worse then she was. The new medication QUEtiapine is not working, she was prescribed this yesterday.     Phone Number Patient can be reached at: Other phone number:  [phone]*    Best Time: any     Can we leave a detailed message on this number? YES    Call taken on 7/7/2017 at 9:25 AM by Olga Ivan

## 2017-07-07 NOTE — PROGRESS NOTES
Spoke to Suzy. The last bed has been taken for today and it is anticipated no more bed availability until the middle of next week. Writer tried to contact [facility] and [facility] - no response on bed availability yet. CANDICE Jeong

## 2017-07-07 NOTE — ED PROVIDER NOTES
Seen with Erika De La Rosa.      Patient is 56-year-old presenting with , and family members after she has had increased amount of confusion, hallucinations, outbursts, and difficulty managing symptoms at home.  She has had emergency department visit 6 days ago, in addition to neurology visit yesterday.  Has had multiple medication adjustments.    Patient does have hallucinations, in addition outbursts.  She is alert, and oriented to person, place, and year.  Concern, from neurology visit is for ongoing worsening decline from her neurology degenerative disease.  She does have recent treatment for bladder infection.  However, concern is for acute delirium in the context of rapidly progressing dementia.  Patient does not appear to have any infectious symptoms.  She is in no acute distress, in the emergency department.    In consultation with Erika De La Rosa, we have consulted DEC who states that patient is not appropriate for [facility] in geriatric psychiatry.  I have also contacted Dr. Oglesby, who states that patient is not appropriate for [facility] as there is no inpatient psychiatry.  However, I feel that patient does have acute delirium with new hallucinations which are present, that would benefit from medical hospitalization, with medication adjustments.    Patient has remained hemodynamically stable, and labs are reviewed, and unremarkable.  Currently awaiting placement for the patient, which has proved to be difficult task.    Continue to search for a hospital bed for this patient as she is not safe for discharge home.  Family members are not able to take care of the patient at home, and she is not a candidate that is safe for discharge home.  Patient has been able to eat as we await appropriate placement situation.  She definitely requires hospitalization for uncontrolled agitation at home with new hallucinations.    Sony Corado, Sony Villela MD  07/07/17 1347

## 2017-07-07 NOTE — LETTER
[facility] EMERGENCY DEPARTMENT  [address] [address]  [phone]    2017    Judith Sifuentes  [address]  [facility] [address]  [phone] (home) None (work)    : 1960      To Whom it may concern:    Judith Sifuentes was seen in our Emergency Department today, 2017. Please excuse Sammy Mcfarlane ( 1980) as he was caring for his mother today in the ER and unable to go to work.    Sincerely,        Erika De La Rosa WHNP, CNM, FNP, DNP

## 2017-07-08 LAB
GLUCOSE BLDC GLUCOMTR-MCNC: 114 MG/DL (ref 70–99)
GLUCOSE BLDC GLUCOMTR-MCNC: 121 MG/DL (ref 70–99)

## 2017-07-08 PROCEDURE — 25000132 ZZH RX MED GY IP 250 OP 250 PS 637: Performed by: INTERNAL MEDICINE

## 2017-07-08 PROCEDURE — 99232 SBSQ HOSP IP/OBS MODERATE 35: CPT | Performed by: INTERNAL MEDICINE

## 2017-07-08 PROCEDURE — 00000146 ZZHCL STATISTIC GLUCOSE BY METER IP

## 2017-07-08 PROCEDURE — 12000001 ZZH R&B MED SURG/OB UMMC

## 2017-07-08 PROCEDURE — 40000275 ZZH STATISTIC RCP TIME EA 10 MIN

## 2017-07-08 PROCEDURE — 25000128 H RX IP 250 OP 636: Performed by: INTERNAL MEDICINE

## 2017-07-08 RX ORDER — SODIUM CHLORIDE 9 MG/ML
INJECTION, SOLUTION INTRAVENOUS
Status: DISCONTINUED
Start: 2017-07-08 | End: 2017-07-17 | Stop reason: HOSPADM

## 2017-07-08 RX ADMIN — Medication 12.5 MG: at 21:44

## 2017-07-08 RX ADMIN — SODIUM CHLORIDE 500 ML: 9 INJECTION, SOLUTION INTRAVENOUS at 04:23

## 2017-07-08 NOTE — PROGRESS NOTES
Pt seen, case reviewed with team    Pt had an syncopal episode while in the bathroom early am. BP 80s/ transiently  Was given IV fluids during the night, has had stable BP since  Staff notes good intake this am  Pt is very unsteady with ambulation, requires assist of one  She has been pleasant and cooperative    Pt is unable to offer any specific physical concerns    BP 140s/70s  Sleepy, easily alerted, oriented to person, easily distracted  Most of speech is word salad.  Frail appearing  Lungs clear  CV rrr no M  Abd soft  No LE edema  + coarse tremors of hands  No focal weakness      UA bland    Assessment    Early onset Alzheimer's dementia with agitation, unmanageable at home. No clear acute medical or neurologic causes. Recent med changes by outpt neurologist included the d/c of lorazepam and aricept and initiation of seroquel (25 mg q hs and prn)    Syncope, ? Vasovagal, ? Related to volume depletion, ? seroquel    Plan  Monitor behaviors, intake  Reduce seroquel to 12.5 mg q hs, monitor BP  Sitter  Review with family and SS  Expect d.c to geropsych or NH/memory care unit

## 2017-07-08 NOTE — PLAN OF CARE
Problem: Goal Outcome Summary  Goal: Goal Outcome Summary  Pt. Alert but confused. Pt did have vasovagal episode overnight, otherwise, VSS. Afebrile. Lung sounds CTA. O2 sats-99% on RA. Bowel sounds active. CMS and neuro's are intact. Denies numbness and tingling in all extremities. Denies nausea, shortness of breath, and chest pain. Denies pain. Pt reports no issues voiding. Tolerating regular diet. Pt up with SBA. PIV placed overnight for 500mL NS bolus, now SL. PCD's on BLE's. Bilateral heels are elevated off the bed. Sitter at bedside for agitation, pt has been pleasant and cooperative. Will continue to monitor.

## 2017-07-08 NOTE — PROGRESS NOTES
"Focus: Patients increase in anxiety  DB: patient climbed out of bed and started walking into halls shaking and screaming stating \" Im scared. I need to go in the house.\"  I: Called patient  Ernesto so patient could talk with him on the phone.   E: Patient was more calm after talking to . Patient was placed back in bed and much more content. Status of patient will continue to be monitored.   "

## 2017-07-08 NOTE — PROGRESS NOTES
Arrived after stat page for rapid response. Patient lying flat in bed answering questions. Patient had a syncopal episode in bathroom, MD states these are common for patient. ED RN attempting IV access for labs and fluids. MD and RN stated they were comfortable with me leaving. Patient VSS.

## 2017-07-08 NOTE — PROGRESS NOTES
"    Social Work: Assessment with Discharge Plan    Patient Name:  Judith Sifuentes  :  1960  Age:  56 year old  MRN:  [id number]  Risk/Complexity Score:   Elevated  Completed assessment with:  Patient's Jon.  [phone]    Presenting Information   Reason for Referral:  Discharge plan  Date of Intake:  2017  Referral Source:  Physician  Decision Maker:  Patient's  and guardian, Jon.  Alternate Decision Maker:  Jon.  Health Care Directive:  Declined completing  Living Situation:  House  Previous Functional Status:  Assistance with Other:  Pt requires 24 hour supervision due to dementia.  Patient and family understanding of hospitalization:  Eval and treat for delirium.  Cultural/Language/Spiritual Considerations:  N?A  Adjustment to Illness:  Pt experiencing advanced dementia/delirium.    Physical Health  Reason for Admission:  No diagnosis found.  Services Needed/Recommended:  Other:  Dementia care      Support System  Significant relationship at present time:  Jon.  Family of origin is available for support:  Yes  Other support available:  Adult children, extended family.  Gaps in support system:  None  Patient is caregiver to:  None     Provider Information   Primary Care Physician:  Erika Davey   [phone]   Clinic:  [facility] [address] [facility]      :  N/A    Financial   Income Source:  Unemployed, on \"social security\" per .  Financial Concerns:   reports he cannot pay for care for pt.  Insurance:    Payor/Plan Subscriber Name Rel Member # Group #   MEDICA -  AND [place]* KAYLAJON NESBITT  [id number]      [address] [address]   MEDICARE - MEDICARE ALEE FLORES  [id number]       ATTN CLAIMS, [address]       Discharge Plan   Patient and family discharge goal:   would like pt placed in a facility.  Provided education on discharge plan:  YES  Patient agreeable to discharge " plan:  YES  A list of Medicare Certified Facilities was provided to the patient and/or family to encourage patient choice. Patient's choices for facility are:  None stated.  Prefers a place near home.  Will NH provide Skilled rehabilitation or complex medical:  YES  General information regarding anticipated insurance coverage and possible out of pocket cost was discussed. Patient and patient's family are aware patient may incur the cost of transportation to the facility, pending insurance payment: YES  Barriers to discharge:  Delirium, behavior.      Additional comments   In discussion with pt's , Jon, he has not completed paperwork for Medical Assistance through [facility] and states that pt's  through the [facility] is completing paperwork.  Per clinic 's notes, Jon is supposed to be completing the paperwork.  Jon states he cannot care for pt anymore and would like her placed.  Explained to Jon that insurance does not cover TCU's for dementia and pt would be private pay at a nursing facility unless she qualifies for MA.  SW offered to contact financial counseling to meet with Jon to determine if pt would qualify.  Jon is agreeable.    Per discussion with MD, plan is to try and stabilize pt's delirium over the weekend, and determine on Monday if pt can go to a nursing facility or will need geriatric psych.  Pt has been combative at home, requiring constant supervision and has a sitter here.    SW to follow.      CANDICE Velazquez  [phone]

## 2017-07-08 NOTE — PLAN OF CARE
Problem: Goal Outcome Summary  Goal: Goal Outcome Summary  Outcome: No Change  Arrived from [facility] via ambulance. VSS . Denies pain.Confused. Needing redirection . On 1:1 sitter.  Resting.    Patient is oriented to self .Tolerated regular diet. Needing redirection even in [employer] .Will continue to monitor.

## 2017-07-08 NOTE — PROGRESS NOTES
Pt was up to BR and lost consciousness when standing up from toilet. Vasovagal episode witnessed by MATTY Osuna, stated pt did shake for about 15 seconds. Pt was assisted back to bed, where BP was 88/55 and returned to baseline (see flowsheets). Rapid response called. Pt regained consciousness oriented to person. Pt was seen in person by moonlighter. 500mL NS bolus ordered and administered.

## 2017-07-08 NOTE — H&P
H&P         Assessment and Plan:   57 yo woman with likely rapidly-progressive early onset A;zheimer's dementia. She continues to decline and had a recent episode of uncontrollable agitation leading to recurrent ED visits.    Continue therapy per Neurology's last note 7/6/2017  - Continue the Namenda 5mg twice daily.   - Stop the Aricept. Stop the Ativan.   - Start Seroquel: 25mg at bedtime. 1/2 tab during the day as needed.   - Neurology consult in the am  [facility]  [facility]        HPI:   57 yo woman with likely rapidly-progressive early onset A;zheimer's dementia. She continues to decline and had a recent episode of uncontrollable agitation leading to recurrent ED visits. She was brought to the [facility], for medical management prior to possible transfer to Geropsychiatry.     The patient is not able to provide quality history. She does not where she is, where she lives except for she is here to get better. No family is present at the bedside.    History is summarized from the chart.    ED notes:   reports the last week has gotten progressively worse.  He reports he has safety proofed the house to try to protect his wife and he is sleeping in front of the door to prevent her from escaping.   reports she is most agitated from 0300 to noon.  He reports he is completely exhausted and cannot take her home.  Yesterday Judith's medications were adjusted and her lorazepam was discontinued and her donzepil was discontinued and she was started on Seroquel 25 mg po qhs and okay to take 1/2 tablet PO 2-3 times during the day for agitation.  She is still taking the namenda 5 mg po bid.  She seems to be eating okay and voiding and stooling okay.  He reports last night was the worst night ever and she was hitting him and biting and kicking him in attempts to help the boy in her hallucinations.  Spoke with  regarding wife and he verbally states he has  guardianship of patient - Judith Sifuentes.      Neurology appointment: 7/6/2017  55 yo woman with likely rapidly-progressive early onset A;zheimer's dementia. She continues to decline and had a recent episode of uncontrollable agitation leading to ED visits and new hallucinations. The UTI may have been at least in part the cause, but it is clear that the patient continue to decline as would be expected with her neurodegenerative disease. The family is working with our  with regards to help at home. From a neuropsychiatric standpoint, the patient has not really needed treatment in the past and I advised them the benzodiazepines are not the solution for the long-term. I would like to stop the Aricept and start Seroquel (I would be worried about concurrent use of these with potential for QT prolongation). We will continue the Namenda.      Phone numbers for the Nurse Triage line were given to the patient's family as well as some information regarding [facility] resources. I agree that it is time for the  to get some respite. He is considering placement in the future so we also briefly discussed this. Keeping her at home is desired for now. I encouraged them to keep up the good work with the music and activities to help stimulate Judit. Hopefully the Seroquel will help to normalize her sleep/wake cycle and calm her during the day. I asked them to let us know if they need any additional help. The patient's family understands and agrees with the plan.     Plan from visit:   Continue the Namenda 5mg twice daily.   Stop the Aricept. Stop the Ativan.   Start Seroquel: 25mg at bedtime. 1/2 tab during the day as needed. *Information provided.         Past Medical History:     Past Medical History:   Diagnosis Date     Spinal puncture headache 5/24/2016            Past Surgical History:   No past surgical history on file.         Social History:     Social History     Social History     Marital  status:      Spouse name: N/A     Number of children: N/A     Years of education: N/A     Occupational History     Not on file.     Social History Main Topics     Smoking status: Never Smoker     Smokeless tobacco: Never Used     Alcohol use Yes      Comment: rare     Drug use: No     Sexual activity: Yes     Partners: Male     Other Topics Concern     Parent/Sibling W/ Cabg, Mi Or Angioplasty Before 65f 55m? No     Social History Narrative            Family History:     Family History   Problem Relation Age of Onset     DIABETES Mother      Hypertension Mother      DIABETES Father      Hypertension Maternal Grandmother      DIABETES Maternal Grandmother      Thyroid Disease Daughter             Allergies:    No Known Allergies         Medications:     Current Facility-Administered Medications on File Prior to Encounter:  [COMPLETED] QUEtiapine (SEROquel) half-tab 12.5 mg     Current Outpatient Prescriptions on File Prior to Encounter:  QUEtiapine (SEROQUEL) 25 MG tablet 25mg (1 tab) at bedtime. Okay to give 1/2 tab 2-3 times during the day as needed.   LORazepam (ATIVAN) 1 MG tablet Take 0.5-1 tablets (0.5-1 mg) by mouth every 6 hours as needed for other (aggitation)   memantine (NAMENDA) 5 MG tablet Take 1 tab in the morning and 2 tabs (10mg) at night for one week and then increase to 2 tabs (10mg ) twice daily. (Patient taking differently: Take 5 mg by mouth 2 times daily Take 1 tab in the morning and 2 tabs (10mg) at night for one week and then increase to 2 tabs (10mg ) twice daily.)            Review of Systems:   Confused cannot provide appropriate review of systems.       Physical Exam:   Temp:  [97.9  F (36.6  C)-98.1  F (36.7  C)] 97.9  F (36.6  C)  Pulse:  [61-83] 83  Heart Rate:  [68-72] 72  Resp:  [16-18] 18  BP: (110-131)/(68-89) 110/68  SpO2:  [97 %-99 %] 97 %  GENERAL APPEARANCE: healthy, alert and no distress  EYES: Eyes grossly normal to inspection, and conjunctivae and sclerae  normal  HEENT: No adenopathy  NECK: no adenopathy, no asymmetry, masses, or scars and thyroid normal to palpation  RESP: lungs clear to auscultation - no rales, rhonchi or wheezes  CV: regular rates and rhythm, normal S1 S2, no S3 or S4  ABDOMEN: soft, nontender, no hepatosplenomegaly, no masses and bowel sounds normal         Data:   CMP  Recent Labs  Lab 07/07/17  1116 07/01/17  1430    141   POTASSIUM 4.3 3.7   CHLORIDE 106 106   CO2 30 30   ANIONGAP 7 5   GLC 91 106*   BUN 31* 25   CR 1.06* 0.77   GFRESTIMATED 53* 77   GFRESTBLACK 64 >90African American GFR Calc   JOSETTE 9.7 9.2   PROTTOTAL 8.1 8.1   ALBUMIN 3.9 4.0   BILITOTAL 0.4 0.3   ALKPHOS 109 110   AST 25 19   ALT 29 28     CBC  Recent Labs  Lab 07/07/17  1116 07/01/17  1430   WBC 5.2 7.9   RBC 4.86 4.51   HGB 15.1 14.1   HCT 45.7 42.7   MCV 94 95   MCH 31.1 31.3   MCHC 33.0 33.0   RDW 13.2 13.4    206     INRNo lab results found in last 7 days.

## 2017-07-09 LAB
ANION GAP SERPL CALCULATED.3IONS-SCNC: 10 MMOL/L (ref 3–14)
BACTERIA SPEC CULT: NORMAL
BUN SERPL-MCNC: 24 MG/DL (ref 7–30)
CALCIUM SERPL-MCNC: 8.8 MG/DL (ref 8.5–10.1)
CHLORIDE SERPL-SCNC: 109 MMOL/L (ref 94–109)
CO2 SERPL-SCNC: 24 MMOL/L (ref 20–32)
CREAT SERPL-MCNC: 0.73 MG/DL (ref 0.52–1.04)
GFR SERPL CREATININE-BSD FRML MDRD: 81 ML/MIN/1.7M2
GLUCOSE SERPL-MCNC: 198 MG/DL (ref 70–99)
MICRO REPORT STATUS: NORMAL
POTASSIUM SERPL-SCNC: 3.7 MMOL/L (ref 3.4–5.3)
SODIUM SERPL-SCNC: 143 MMOL/L (ref 133–144)
SPECIMEN SOURCE: NORMAL

## 2017-07-09 PROCEDURE — 80048 BASIC METABOLIC PNL TOTAL CA: CPT | Performed by: INTERNAL MEDICINE

## 2017-07-09 PROCEDURE — 36415 COLL VENOUS BLD VENIPUNCTURE: CPT | Performed by: INTERNAL MEDICINE

## 2017-07-09 PROCEDURE — 25000132 ZZH RX MED GY IP 250 OP 250 PS 637: Performed by: INTERNAL MEDICINE

## 2017-07-09 PROCEDURE — 12000001 ZZH R&B MED SURG/OB UMMC

## 2017-07-09 PROCEDURE — 99231 SBSQ HOSP IP/OBS SF/LOW 25: CPT | Performed by: INTERNAL MEDICINE

## 2017-07-09 RX ADMIN — Medication 12.5 MG: at 20:47

## 2017-07-09 NOTE — PLAN OF CARE
Problem: Goal Outcome Summary  Goal: Goal Outcome Summary  Outcome: No Change  Patient is calm,cooperative .  Denies pain. Alert,confused.Family visited. unsteady with ambulation even to the BR. Tolerating diet. On 1:1 sitter. Will continue to monitor.

## 2017-07-09 NOTE — PLAN OF CARE
Problem: Individualization  Goal: Patient Preferences  7/8 07:30-15:30  Resting quietly in bed.  1:1 sitter present.  Confused.  Oriented to name only.  Up to bathroom with contact guard assist.  Cooperative.

## 2017-07-09 NOTE — PROGRESS NOTES
Pt seen, case reviewed with staff,     No agitation overnight  No further syncopal episodes  Pt remains very unsteady on feet  Intake good    VSS  BP 110s-120s  Alert, in NAD, lying in bed, oriented to self  Speech word salad  Lungs clear  CV rrr  Abd soft  No tremors or rigidity    UC neg    Assessment    Dementia, rapidly progressive with episodic agitation at home. No obvious reversible component.  On hs and prn seroquel    Syncopal episode on the night of admission, ? Vaso vagal, ? Secondary to recently started seroquel (25 mg at hs).  Vitals stable since then    Gait instability secondary to dementia    Caregiver burnout     Plan  Continue current medications  Disposition to dementia unit vs placement in memory care unit

## 2017-07-09 NOTE — PLAN OF CARE
Problem: Goal Outcome Summary  Goal: Goal Outcome Summary  Outcome: No Change  Patient slept throughout most of the night. 1:1 attendant at bedside. Patient very confused overnight, only orientated to self. Patient denies nay pain, chest pain, SOB, numbness or tingling.Voiding spontaneously without difficulty. PIV SL. Frequent rounding completed and patient shows no signs of pain or discomfort. Continue POC.

## 2017-07-10 ENCOUNTER — CARE COORDINATION (OUTPATIENT)
Dept: CARE COORDINATION | Facility: CLINIC | Age: 57
End: 2017-07-10

## 2017-07-10 PROCEDURE — 99231 SBSQ HOSP IP/OBS SF/LOW 25: CPT | Performed by: INTERNAL MEDICINE

## 2017-07-10 PROCEDURE — 12000001 ZZH R&B MED SURG/OB UMMC

## 2017-07-10 PROCEDURE — 25000132 ZZH RX MED GY IP 250 OP 250 PS 637: Performed by: INTERNAL MEDICINE

## 2017-07-10 RX ORDER — AMOXICILLIN 250 MG
1 CAPSULE ORAL 2 TIMES DAILY
Status: DISCONTINUED | OUTPATIENT
Start: 2017-07-10 | End: 2017-07-18 | Stop reason: HOSPADM

## 2017-07-10 RX ADMIN — SENNOSIDES AND DOCUSATE SODIUM 1 TABLET: 8.6; 5 TABLET ORAL at 22:20

## 2017-07-10 RX ADMIN — Medication 12.5 MG: at 16:33

## 2017-07-10 RX ADMIN — SENNOSIDES AND DOCUSATE SODIUM 1 TABLET: 8.6; 5 TABLET ORAL at 11:23

## 2017-07-10 RX ADMIN — Medication 12.5 MG: at 22:19

## 2017-07-10 RX ADMIN — Medication 12.5 MG: at 10:06

## 2017-07-10 NOTE — PROGRESS NOTES
"[facility] Internal Medicine Progress Note            Interval History:   Record reviewed.  Discussed with Dr. Conde.  Seen with RN.  On scheduled seroquel at HS. Per RN has generally been pleasant.   PRN 12.5 mg dose this AM for restlessness, calling out.  Appears to take limited po.  Noted to be bearing down in BR this AM per record.  Bowel meds ordered.  Time of last BM unclear.  Remains unsteady on feet. Assist of 1 when up to BR.   Presently denies \"pain\".  No CP, SOB, nausea.              Medications:   All medications reviewed today          Physical Exam:   Blood pressure 110/58, pulse 57, temperature 97.7  F (36.5  C), temperature source Oral, resp. rate 16, last menstrual period 09/18/2013, SpO2 99 %.    Intake/Output Summary (Last 24 hours) at 07/10/17 1033  Last data filed at 07/09/17 1330   Gross per 24 hour   Intake              180 ml   Output                0 ml   Net              180 ml       General:  Alert.  Rambling speech.  No distress.     Heent:      Neck:    Skin:    Chest:  clear    Cardiac:  Reg without gallop, murmur.    Abdomen:  Non distended, soft, non tender.  BS normal.    Extremities:  Perfused.  No calf, thigh swelling, discomfort, induration.     Neuro:            Data:   No results found for this or any previous visit (from the past 24 hour(s)).  Last Basic Metabolic Panel:  Lab Results   Component Value Date     07/09/2017      Lab Results   Component Value Date    POTASSIUM 3.7 07/09/2017     Lab Results   Component Value Date    CHLORIDE 109 07/09/2017     Lab Results   Component Value Date    JOSETTE 8.8 07/09/2017     Lab Results   Component Value Date    CO2 24 07/09/2017     Lab Results   Component Value Date    BUN 24 07/09/2017     Lab Results   Component Value Date    CR 0.73 07/09/2017     Lab Results   Component Value Date     07/09/2017                  Assessment and Plan:   1)  Rapidly progressive early onset Alzheimer's dementia with uncontrolled " agitation.  Improved behaviors.  Still requiring 1:1.   2)  Syncope subsequent to admission. No recurrence.  With limited po monitor for orthostasis.   3)  Gait instability.     PLAN:  1)  Encourage diet/po.  2)  Mobilize as tolerated with assist.  Check upright BP.  3)  Continue present seroquel dose.  4)  Bowel meds. Supp if no BM next 1-2 days.   5)  Dispo - plan for memory care unit.   no funds.  Contact  for review.    Disposition Plan   Expected discharge not determined memory care unit once appropriate facility found and funding in place.      Entered: Be Thurman 07/10/2017, 10:33 AM              Attestation:  I have reviewed today's vital signs, notes, medications, labs and imaging.     Be Thurman MD

## 2017-07-10 NOTE — PLAN OF CARE
Problem: Goal Outcome Summary  Goal: Goal Outcome Summary  Outcome: No Change  Pt is oriented to person only. VSS. On 1:1 sitter. Was talking out loud at around  20:30-21:30 per sitter report.. Scheduled Seroquel 12.5 mg po given earlier, helped. Will continue to monitor.

## 2017-07-10 NOTE — PROGRESS NOTES
"Clinic Care Coordination Contact    [facility] Care Coordination Hand-in Note    Clinical Information: Care Coordinator received notification via [employer] that pt was admitted to [facility] on 7/7/17.  DAYTON reviewed pt's EMR and also received phone call from pt's , Tavon, asking several questions about pt's discharge plan of care, MA application, and what are the next steps for the pt.    DAYTON explained that it may be in Tavon's best interest to get a note book and write down when/who is calling to speak with him about Caity, as he is very confused and frustrated that he is not understanding what is happening.  He said, \"they are kicking her out and I have no place to put her.\"    DAYTON was able to explain to Tavon that in order for the pt to be discharged, he would receive a phone call from the Unit DAYTON, discussing options for where the pt is able to go next and that together, a plan would be created.  Tavon and this writer worked with the [facility], Patient , Sandeep at [phone], to get an appointment today for Tavon to complete the long term MA application instead of waiting for the application to come in the mail from the [facility] as we had previously discussed.  DAYTON spoke with Sandeep, who will call & meet with the pt today in [place].    DAYTON also sent an inbox message to Unit SW, Isidra, to call this writer for any questions.  I would like to discuss family dynamics and placement ideas with her, as able.    Plan: Care Coordinator will review the chart every 1-2 days to follow up with pt's status and discharge planning.     Genesis Gibbons  Social Work Care Coordinator  [facility]  [phone]                 "

## 2017-07-10 NOTE — PLAN OF CARE
Problem: Individualization  Goal: Patient Preferences  Outcome: No Change            VS:    Stable   Output:    Voiding - encourage BM  Stool softeners given   Activity:    Up with assist of 1  On 1:1 sitter for confusion.   Skin: WNL   Pain:    denies   CMS:    WNL   Dressing:    No dressing   Diet:    Reg - encourage patient to eat   LDA:    Left hand IV   Equipment:    PCD's   Plan:    Transition to memory care unit   Additional Info:

## 2017-07-10 NOTE — PLAN OF CARE
Problem: Goal Outcome Summary  Goal: Goal Outcome Summary  Outcome: No Change  VSS. Afebrile. Pt unsteady on feet and needs 1 assist to bathroom. Continues to have attendant for safety. Voiding adequately. Denies N/V. LS cl and BS active. Pt only oriented to self. Unable to be re-oriented. No aggressive behavior tonight. Call light in reach and able to make needs known.

## 2017-07-11 ENCOUNTER — CARE COORDINATION (OUTPATIENT)
Dept: CARE COORDINATION | Facility: CLINIC | Age: 57
End: 2017-07-11

## 2017-07-11 LAB
ALBUMIN UR-MCNC: NEGATIVE MG/DL
ANION GAP SERPL CALCULATED.3IONS-SCNC: 11 MMOL/L (ref 3–14)
APPEARANCE UR: CLEAR
BILIRUB UR QL STRIP: NEGATIVE
BUN SERPL-MCNC: 24 MG/DL (ref 7–30)
CALCIUM SERPL-MCNC: 9.4 MG/DL (ref 8.5–10.1)
CHLORIDE SERPL-SCNC: 108 MMOL/L (ref 94–109)
CO2 SERPL-SCNC: 25 MMOL/L (ref 20–32)
COLOR UR AUTO: NORMAL
CREAT SERPL-MCNC: 0.75 MG/DL (ref 0.52–1.04)
GFR SERPL CREATININE-BSD FRML MDRD: 79 ML/MIN/1.7M2
GLUCOSE SERPL-MCNC: 104 MG/DL (ref 70–99)
GLUCOSE UR STRIP-MCNC: NEGATIVE MG/DL
HGB UR QL STRIP: NEGATIVE
KETONES UR STRIP-MCNC: NEGATIVE MG/DL
LEUKOCYTE ESTERASE UR QL STRIP: NEGATIVE
NITRATE UR QL: NEGATIVE
PH UR STRIP: 5 PH (ref 5–7)
POTASSIUM SERPL-SCNC: 4 MMOL/L (ref 3.4–5.3)
RBC #/AREA URNS AUTO: 0 /HPF (ref 0–2)
SODIUM SERPL-SCNC: 144 MMOL/L (ref 133–144)
SP GR UR STRIP: 1.01 (ref 1–1.03)
SQUAMOUS #/AREA URNS AUTO: <1 /HPF (ref 0–1)
URN SPEC COLLECT METH UR: NORMAL
UROBILINOGEN UR STRIP-MCNC: NORMAL MG/DL (ref 0–2)
WBC #/AREA URNS AUTO: 1 /HPF (ref 0–2)

## 2017-07-11 PROCEDURE — 36415 COLL VENOUS BLD VENIPUNCTURE: CPT | Performed by: INTERNAL MEDICINE

## 2017-07-11 PROCEDURE — 12000001 ZZH R&B MED SURG/OB UMMC

## 2017-07-11 PROCEDURE — 81001 URINALYSIS AUTO W/SCOPE: CPT | Performed by: INTERNAL MEDICINE

## 2017-07-11 PROCEDURE — 25000132 ZZH RX MED GY IP 250 OP 250 PS 637: Performed by: INTERNAL MEDICINE

## 2017-07-11 PROCEDURE — 87086 URINE CULTURE/COLONY COUNT: CPT | Performed by: INTERNAL MEDICINE

## 2017-07-11 PROCEDURE — 80048 BASIC METABOLIC PNL TOTAL CA: CPT | Performed by: INTERNAL MEDICINE

## 2017-07-11 PROCEDURE — 99231 SBSQ HOSP IP/OBS SF/LOW 25: CPT | Performed by: INTERNAL MEDICINE

## 2017-07-11 RX ADMIN — Medication 12.5 MG: at 07:24

## 2017-07-11 RX ADMIN — Medication 12.5 MG: at 20:54

## 2017-07-11 RX ADMIN — Medication 6.25 MG: at 16:29

## 2017-07-11 NOTE — PLAN OF CARE
Problem: Goal Outcome Summary  Goal: Goal Outcome Summary  Outcome: No Change  Pt oriented to self only.calm and cooperative for shift.attendant gave pt a bath.no skin issues noted.VSS.denies nausea,cp,sob.voiding without difficulty.UA clean.seroquel given as scheduled.Pt will need placement.sitter at bedside.continue POC.

## 2017-07-11 NOTE — PLAN OF CARE
Problem: Goal Outcome Summary  Goal: Goal Outcome Summary  Outcome: No Change  Pt D/O X 3, pt only knows self. Pt agitated prior in the morning, received prn 12.5mg seroquel, pt has been calm since then. Afebrile. VSS. Lungs  bilaterally. IS encouraged. PP+ DP+. CMS and Neuro's are intact. Denies numbness and tingling in all extremities. Denies nausea, SOB, and CP. Denies pain. Voids without difficulty in the BR, aid noted foul smell urine, UA collected. LBM unknown. Tolerating regular diet, poor appetite. Pt up 1 assist. PCD's on BLE's. Bilateral heels are elevated off the bed. Pt is able to make needs known and the call light is within the pt's reach. Continue to monitor.

## 2017-07-11 NOTE — PLAN OF CARE
Problem: Individualization  Goal: Patient Preferences            VS:    VSS    Output:    Voiding independently. Per Dr. Thurman, if no BM in 1-2 days consider suppository. Senna given this evening   Activity:    Pt ambulated with sitter.    Skin: Intact - UTV saccryl/ coccyx area    Pain:    No reports of pain this evening.    CMS:    Intact   Dressing:    None    Diet:    Regular - ate dinner with encouragement.    LDA:    -    Equipment:        Plan:    Awaiting placement at care facility for dementia.    Additional Info:

## 2017-07-11 NOTE — PROGRESS NOTES
Clinic Care Coordination Contact      SW received phone call from pt's  stating he was at [facility] waiting for a  to meet with him at 10:00 am to complete the medical assistance application.  Pt said he received a phone message which told him to be at his wife's room this morning and that someone would meet with him to complete the application, however, no one is there.  DAYTON advised Tavon to speak with the Unit SW and/or the pt's RN for assistance.    DAYTON sent notification to Unit DAYTON to assist pt's , if possible, and to contact this writer for further background information.  This writer is concerned about Tavon's ability follow through with paperwork without assistance due to being overwhelmed and easily frustrated & distracted at this time.    Genesis Gibbons  Social Work Care Coordinator  [place] [facility]  [phone]

## 2017-07-11 NOTE — PLAN OF CARE
Problem: Goal Outcome Summary  Goal: Goal Outcome Summary  Outcome: No Change  Alert to self, disoriented to place, time and situation. Denies pain/discomfort.  Continent of bowel and bladder  ,Assist of 1 and gait belt for ambulating, gait is unsteady.  Appears to be sleeping most of night. Around 0400 woke up and was quite agitated. Received  Seroquel 12.5 mg  X 1. Sitter reported that urine was foul smelling. PO fluids encouraged.  Sitter at bedside for safety. Continue with plan of care.

## 2017-07-11 NOTE — PROGRESS NOTES
[facility] Internal Medicine Progress Note            Interval History:   Record reviewed.   Seen with RN.  Remains on scheduled seroquel 12.5mg at HS. Slept until 4 AM.  Ambulated with aid with reported fairly stable gait.  No orthostasis 7/10.  Periods of crying.  Directable.  Sleeping when seen.  Received seroquel 12.5 mg this AM.  Awakened during exam.  Pleasant.  Indicates feeling well.  Eating 50% of meal.  No recent stool recorded.  Bowel meds started 7/10. Urine noted to be foul smelling.             Medications:   All medications reviewed today          Physical Exam:   Blood pressure 153/82, pulse 70, temperature 96.3  F (35.7  C), temperature source Oral, resp. rate 18, last menstrual period 09/18/2013, SpO2 96 %.    Intake/Output Summary (Last 24 hours) at 07/10/17 1033  Last data filed at 07/09/17 1330   Gross per 24 hour   Intake              180 ml   Output                0 ml   Net              180 ml       General:  Alert.  Confused.  Limited verbal response.   No distress.     Heent:      Neck:    Skin:    Chest:  clear    Cardiac:  Reg without gallop, murmur.    Abdomen:  Non distended, soft, non tender.  BS normal.    Extremities:  Perfused.  No calf, thigh swelling, discomfort, induration.     Neuro:            Data:   No results found for this or any previous visit (from the past 24 hour(s)).  Last Basic Metabolic Panel:  Lab Results   Component Value Date     07/09/2017      Lab Results   Component Value Date    POTASSIUM 3.7 07/09/2017     Lab Results   Component Value Date    CHLORIDE 109 07/09/2017     Lab Results   Component Value Date    JOSETTE 8.8 07/09/2017     Lab Results   Component Value Date    CO2 24 07/09/2017     Lab Results   Component Value Date    BUN 24 07/09/2017     Lab Results   Component Value Date    CR 0.73 07/09/2017     Lab Results   Component Value Date     07/09/2017                  Assessment and Plan:   1)  Rapidly progressive early onset  Alzheimer's dementia with uncontrolled agitation.  Improved behaviors with persistent periods during day of mood lability.   Still requiring 1:1.   2)  Syncope subsequent to admission. No recurrence.  BP mildly elevated as above.  Monitor for now.   3)  Gait instability.     PLAN:  1)  Encourage diet/po.  2)  Mobilize as tolerated with assist.    3)  Increase seroquel 6.25mg BID with 12.5 mg HS to help calm daytime behaviors..  4)  Bowel meds. Supp if no BM next 1-2 days. 5)  Recheck BMP.  Check UA/UC with foul smelling urine.  6)  Monitor clinically.  7)  Dispo - plan for memory care unit.   no funds.   to come in today.   Disposition Plan   Expected discharge not determined memory care unit once appropriate facility found and funding in place.      Entered: Be Thurman 07/11/2017, 8:49 AM              Attestation:  I have reviewed today's vital signs, notes, medications, labs and imaging.     Be Thurman MD

## 2017-07-12 ENCOUNTER — CARE COORDINATION (OUTPATIENT)
Dept: CARE COORDINATION | Facility: CLINIC | Age: 57
End: 2017-07-12

## 2017-07-12 LAB
BACTERIA SPEC CULT: NORMAL
Lab: NORMAL
MICRO REPORT STATUS: NORMAL
SPECIMEN SOURCE: NORMAL

## 2017-07-12 PROCEDURE — 12000001 ZZH R&B MED SURG/OB UMMC

## 2017-07-12 PROCEDURE — 25000132 ZZH RX MED GY IP 250 OP 250 PS 637: Performed by: INTERNAL MEDICINE

## 2017-07-12 PROCEDURE — 99207 ZZC CDG-MDM COMPONENT: MEETS MODERATE - UP CODED: CPT | Performed by: INTERNAL MEDICINE

## 2017-07-12 PROCEDURE — 99232 SBSQ HOSP IP/OBS MODERATE 35: CPT | Performed by: INTERNAL MEDICINE

## 2017-07-12 PROCEDURE — 99254 IP/OBS CNSLTJ NEW/EST MOD 60: CPT

## 2017-07-12 RX ORDER — SERTRALINE HCL 25 MG
12.5 TABLET ORAL AT BEDTIME
Status: DISCONTINUED | OUTPATIENT
Start: 2017-07-12 | End: 2017-07-18 | Stop reason: HOSPADM

## 2017-07-12 RX ADMIN — Medication 6.25 MG: at 16:45

## 2017-07-12 RX ADMIN — SENNOSIDES AND DOCUSATE SODIUM 1 TABLET: 8.6; 5 TABLET ORAL at 08:02

## 2017-07-12 RX ADMIN — Medication 6.25 MG: at 08:02

## 2017-07-12 RX ADMIN — Medication 12.5 MG: at 21:47

## 2017-07-12 RX ADMIN — SENNOSIDES AND DOCUSATE SODIUM 1 TABLET: 8.6; 5 TABLET ORAL at 21:47

## 2017-07-12 NOTE — PLAN OF CARE
Problem: Goal Outcome Summary  Goal: Goal Outcome Summary  Outcome: No Change  Patient restless and agitated most of night. Ambulated in hallway with sitter. Bedside attendant for safety.

## 2017-07-12 NOTE — PROGRESS NOTES
[facility] Internal Medicine Progress Note            Interval History:   Record reviewed.   Seen with RN.  On scheduled seroquel 6.25 mg BID with 12.5 mg HS.  Indication by RN continuing to have periods of crying.  Does endorse feeling sad.  Restless and agitated most of the night per documentation.  Ambulatory with stable gait per RN.  Tolerating diet.  BM 7/11.  Denies CP, SOB, cough, nausea, abd pain.              Medications:   All medications reviewed today          Physical Exam:   Blood pressure 122/48, pulse 70, temperature 97.4  F (36.3  C), temperature source Oral, resp. rate 17, last menstrual period 09/18/2013, SpO2 97 %.    Intake/Output Summary (Last 24 hours) at 07/10/17 1033  Last data filed at 07/09/17 1330   Gross per 24 hour   Intake              180 ml   Output                0 ml   Net              180 ml       General:  Alert.  Confused - believes in place to help sick.   No distress.     Heent:      Neck:    Skin:    Chest:  clear    Cardiac:  Reg without gallop, murmur.    Abdomen:  Non distended, soft, non tender.  BS normal.    Extremities:  Perfused.  No calf, thigh swelling, discomfort, induration.     Neuro:            Data:     Results for orders placed or performed during the hospital encounter of 07/07/17 (from the past 24 hour(s))   Basic metabolic panel   Result Value Ref Range    Sodium 144 133 - 144 mmol/L    Potassium 4.0 3.4 - 5.3 mmol/L    Chloride 108 94 - 109 mmol/L    Carbon Dioxide 25 20 - 32 mmol/L    Anion Gap 11 3 - 14 mmol/L    Glucose 104 (H) 70 - 99 mg/dL    Urea Nitrogen 24 7 - 30 mg/dL    Creatinine 0.75 0.52 - 1.04 mg/dL    GFR Estimate 79 >60 mL/min/1.7m2    GFR Estimate If Black >90   GFR Calc   >60 mL/min/1.7m2    Calcium 9.4 8.5 - 10.1 mg/dL   UA with Microscopic   Result Value Ref Range    Color Urine Light Yellow     Appearance Urine Clear     Glucose Urine Negative NEG mg/dL    Bilirubin Urine Negative NEG    Ketones Urine Negative  NEG mg/dL    Specific Gravity Urine 1.013 1.003 - 1.035    Blood Urine Negative NEG    pH Urine 5.0 5.0 - 7.0 pH    Protein Albumin Urine Negative NEG mg/dL    Urobilinogen mg/dL Normal 0.0 - 2.0 mg/dL    Nitrite Urine Negative NEG    Leukocyte Esterase Urine Negative NEG    Source Midstream Urine     WBC Urine 1 0 - 2 /HPF    RBC Urine 0 0 - 2 /HPF    Squamous Epithelial /HPF Urine <1 0 - 1 /HPF   Urine Culture Aerobic Bacterial   Result Value Ref Range    Specimen Description Midstream Urine     Special Requests Specimen received in preservative     Culture Micro Pending     Micro Report Status Pending               Assessment and Plan:   1)  Rapidly progressive early onset Alzheimer's dementia with uncontrolled agitation.  Improved behaviors with persistent periods during day of mood lability.   Incompletely compensated.  Component of depression. Still requiring 1:1.   2)  Syncope subsequent to admission (possible decrease volume/seroquel, possible vasovagal per record)   No recurrence.  BP adequate.   Monitor for now.   3)  Gait instability.     PLAN:  1)  Encourage diet/po.  2)  Mobilize as tolerated with assist.    3)  Same seroquel for now 6.25mg BID with 12.5 mg HS to help calm daytime behaviors.. Request psychiatry follow up.  4)  Bowel meds.  5)  Dispo - plan for memory care unit.  Update .   Disposition Plan   Expected discharge not determined memory care unit once appropriate facility found and funding in place.      Entered: Be Thurman 07/12/2017, 8:39 AM              Attestation:  I have reviewed today's vital signs, notes, medications, labs and imaging.     Be Thurman MD

## 2017-07-12 NOTE — PLAN OF CARE
Problem: Goal Outcome Summary  Goal: Goal Outcome Summary  Outcome: No Change            VS:    stable   Output:    Voiding without difficulty  .  BM yesterday - soft so hold stool softeners   Activity:    SBA others independent in bed   Skin: WNL   Pain:    denies   CMS:    WNL   Dressing:    none   Diet:    Reg - adequate   LDA:    Left hand piv - sl   Equipment:    pcd's   Plan:    Memory care unit - resources are difficult for family.   Additional Info:

## 2017-07-12 NOTE — CONSULTS
Psychiatry    Chart reviewed, patient interviewed, discussed with Dr. Mcneil.    Impression: Early onset rapidly progressive Alzheimer's Disease with agitation and mood symptoms. Concerns about syncope and falls.    Recommendations:  1.  Consider replacing Seroquel with low dose Zyprexa if orthostatics and falls are a consideration. Would try 1.25 mg qhs and q 4-6 hrs prn to start and adjust as indicated.  2.  Zoloft may be helpful for depression and irritability.  Consider starting at 12.5 mg to 25 mg hs and titrate by 12.5 to 25mg per day q 2-3days to an initial dose of 50 mg and monitor.  Can go up if needed.  3.  Dictation to follow, call with questions.    Tony Saldana MD  [phone]

## 2017-07-12 NOTE — PROGRESS NOTES
Clinic Care Coordination Contact    DAYTON received voice message from pt's  asking for an update on his MA application and pt's status.  DAYTON placed a call to inpatient Isidra BURRIS, and explained that Tavon is calling this writer daily looking for updates and is confused to the process of hospital discharge planning.  DAYTON and Isidra discussed pt's status and DAYTON was informed that Tavon met with financial sung Kilgore yesterday to complete the MA application.    DAYTON returned Tavon's call and provided him with Unit Isidra BURRIS's phone number for DC updates, but also explained that Isidra will be contacting him with information as she gets it.    Tavon has several questions regarding the MA application.  Example:  When the pt received her back pay from [employer], her daughter Fiona also received $12K in back pay and that money is for her to go to college this fall, however, the money is in her parent's account due to Fiona's spending habits.  Tavon wants to know what he should do with that money as it is not his or the pt's, but their daughters.  DAYTON informed Tavon to not move any of his money at this time and to find all of the SSDI paperwork that will show who received what funds & when so that he can show it to [facility] when asked.  Tavon agreed with this.    DAYTON to continue to follow as needed.    Genesis Gibbons  Social Work Care Coordinator  [facility]  [phone]

## 2017-07-12 NOTE — PLAN OF CARE
Problem: Goal Outcome Summary  Goal: Goal Outcome Summary  Outcome: No Change  [id number] Shift Summary: VSS. Pt denies pain. Pt oriented to self only. Liborio cp, sob, calf pain, nausea. Tolerating regular diet. Pt up with assistance from bedside attendant. Lungs CTA. Bedside attendant maintained for patient safety.

## 2017-07-12 NOTE — PROGRESS NOTES
Social Work Services Progress Note        Data: SW following for Memory Care Placement.  Intervention: Spoke with Jame from Financial [employer] who completed MA application for pt with pt's , Jon.  Unknown whether pt will qualify as pt and  have assets.  DAYTON spoke with pt's Jon who states that pt cannot come home as family is unable to to take care of her, so they are agreeable to pay for placement if necessary until MA is approved.  Jon told writer that he has spoken with Myra Garay ([phone]) from [facility] in [facility], but they currently have no openings.  He states that he was told there was an opening in [place].  He believes they accept MA.  Jon gave permission for SW to contact them in regard to placement.  Social Work left message for Myra.  DAYTON  explained that pt may need to go into a dementia unit in a skilled nursing facility and SW is working on options as discussed previously.  Jon in agreement.  SW provided Jon with direct phone number and explained that while pt is in the hospital, the hospital  will be working with him.  Pt has been contacting his clinic , who writer spoke with this AM.    Social Work has contacted the following facilities:  [facility]: ([phone] and F:[phone])- Assessing for dementia unit.  [facility]- Currently no dementia beds  [facility]: ([phone] and F:[phone])- Assessing, may have a bed.  [facility]- Do not have a locked unit.  [facility]- Do not have any dementia beds.  [facility] ([facility] [phone]): Assessing pt for dementia bed at [facility].    Plan:    Discharge Plans in Progress: Memory Care Placement    Barriers to d/c plan: Medical-working on medication adjustments.    Follow up Plan: SW will continue to follow.      CANDICE Velazquez  [phone]

## 2017-07-13 PROCEDURE — 12000001 ZZH R&B MED SURG/OB UMMC

## 2017-07-13 PROCEDURE — 25000132 ZZH RX MED GY IP 250 OP 250 PS 637: Performed by: INTERNAL MEDICINE

## 2017-07-13 PROCEDURE — 99231 SBSQ HOSP IP/OBS SF/LOW 25: CPT | Performed by: INTERNAL MEDICINE

## 2017-07-13 RX ADMIN — Medication 6.25 MG: at 09:05

## 2017-07-13 RX ADMIN — Medication 12.5 MG: at 21:37

## 2017-07-13 RX ADMIN — Medication 6.25 MG: at 15:58

## 2017-07-13 RX ADMIN — SENNOSIDES AND DOCUSATE SODIUM 1 TABLET: 8.6; 5 TABLET ORAL at 20:26

## 2017-07-13 NOTE — PLAN OF CARE
Problem: Individualization  Goal: Patient Preferences  VS:     VSS   Output:     Adequate output   Activity:     Gets up to the bath room with assist of one with gait belt   Skin:    intact   Pain:     Denies pain when asked                      CMS:     Denies numbness and tingling.   Dressing:     N/A   Diet:     Regular    LDA:     IV on the left arm   Equipment:         Plan:     Placement in memory care    Additional Info:     Slept most of the shift and just woke up

## 2017-07-13 NOTE — PLAN OF CARE
Problem: Goal Outcome Summary  Goal: Goal Outcome Summary  Outcome: Improving      VS:    VSS   Output:    Adequate output in BR; LBM 7/13.   Activity:    Up with 1 A. 1:1 present until 1630 when VPM and bed alarm were placed. Pt has been in bed since.   Skin: Intact   Pain:    Denies pain.   CMS:    Denies numbness/tingling   Diet:    Room service with A. Tolerating regular diet. Set up for supper.   LDA:    PIV DC'd   Equipment:    VPM   Plan:    Goal is to DC to memory care upon DC; pt needs to be off 1:1 24 hours.   Additional Info:    Oriented to self.

## 2017-07-13 NOTE — PROGRESS NOTES
Social Work Services Progress Note    Hospital Day: 7    Collaborated with:  8A nursing staff    Data:  DC plan    Intervention:  Writer followed up with phone calls to the following facilities:    [facility] [phone] (voicemail left)  [facility] [phone] F: [phone] (voicemail left) - They are still assessing, pt will need to be off of 1:1 for minimum of 24 hours, will not admit to memory care on weekend, facility needs documentation of behaviors to continue assessment.  [facility] [phone] F: [phone] (No Beds available)  [facility] con't to assess ([phone] Angella)      Assessment:  pt would benefit from memory care    Plan:    Anticipated Disposition:  memory care. Pt is on 1:1 and would need to be off for 24 hours    Barriers to d/c plan:  Assessment process, finding available bed    Follow Up:  SW con't to follow      Addendum:  [facility] and [facility] would like to continue to assess pt once she is off of 1:1. Writer informed DIANA Toledo Charge RN. She requested writer paged Dr. Thurman with information.

## 2017-07-13 NOTE — PLAN OF CARE
Problem: Individualization  Goal: Patient Preferences        7/12 15:30-23:30  VS:    VSS   Output:    Voids spontaneously without difficulty   Activity:    Up to bathroom with assist of 1   Skin: intact   Pain:    No nonverbal indicators present   CMS:    intact   Dressing:    none   Diet:    Regular  Fair appetite   LDA:    PIV left hand, not accessed this shift   Equipment:    Gait belt   Plan:        Additional Info:    Quiet and cooperative this shift.

## 2017-07-13 NOTE — PLAN OF CARE
Problem: Goal Outcome Summary  Goal: Goal Outcome Summary  Outcome: No Change  VS:     VSS   Output:     Adequate output   Activity:     Gets up to the bath room with assist of one with gait belt> ambulated in hallway X 2   Skin:     intact   Pain:     Denies pain when asked                            CMS:     Denies numbness and tingling.   Dressing:     N/A   Diet:     Regular    LDA:     IV on the left arm patent and SL   Equipment:     Gait belt    Plan:     Placement in memory care   Additional Info:     1:1 sitter at bedside

## 2017-07-13 NOTE — PROGRESS NOTES
[facility] Internal Medicine Progress Note            Interval History:   Record reviewed.   Seen with RN.  On scheduled seroquel 6.25 mg BID with 12.5 mg HS.  Addition of low dose zoloft based on psychiatry recommendation.  Continued periods of crying.  No significant episodes of agitation or inability to re-direct.  Today does not  endorse feeling sad.  Ambulatory with stable gait per RN.  Tolerating diet.  BM 7/11.  Denies CP, SOB, cough, nausea, abd pain.              Medications:   All medications reviewed today          Physical Exam:   Blood pressure 109/65, pulse 57, temperature 97.6  F (36.4  C), temperature source Oral, resp. rate 16, last menstrual period 09/18/2013, SpO2 95 %.    Intake/Output Summary (Last 24 hours) at 07/10/17 1033  Last data filed at 07/09/17 1330   Gross per 24 hour   Intake              180 ml   Output                0 ml   Net              180 ml          General:  Alert.  Confused.  Pleasant.  No distress.     Heent:      Neck:    Skin:    Chest:  clear    Cardiac:  Reg without gallop, murmur.    Abdomen:  Non distended, soft, non tender.  BS normal.    Extremities:  Perfused.  No calf, thigh swelling, discomfort, induration.     Neuro:            Data:     No results found for this or any previous visit (from the past 24 hour(s)).  UC mixed katlyn.          Assessment and Plan:   1)  Rapidly progressive early onset Alzheimer's dementia with uncontrolled agitation.  Improved Potential component of depression.     2)  Syncope subsequent to admission (possible decrease volume/seroquel, possible vasovagal per record)   No recurrence.  BP adequate.   Monitor for now.   3)  Gait instability. Improved stability.     PLAN:  1)  Encourage diet/po.  2)  Mobilize as tolerated with assist.    3)  Same seroquel and zoloft. Needs DC 1:1 attendant for SNF acceptance.  Will Dc and place on video monitor.   4)  Bowel meds.  5)  Dispo - plan for memory care unit.  Update .    Disposition Plan   Expected discharge not determined memory care unit once appropriate facility found and funding in place.      Entered: Be Thurman 07/13/2017, 3:37 PM              Attestation:  I have reviewed today's vital signs, notes, medications, labs and imaging.     Be Thurman MD

## 2017-07-14 PROCEDURE — 12000001 ZZH R&B MED SURG/OB UMMC

## 2017-07-14 PROCEDURE — 99232 SBSQ HOSP IP/OBS MODERATE 35: CPT | Performed by: INTERNAL MEDICINE

## 2017-07-14 PROCEDURE — 25000132 ZZH RX MED GY IP 250 OP 250 PS 637: Performed by: INTERNAL MEDICINE

## 2017-07-14 PROCEDURE — 99207 ZZC CDG-MDM COMPONENT: MEETS MODERATE - UP CODED: CPT | Performed by: INTERNAL MEDICINE

## 2017-07-14 RX ORDER — QUETIAPINE FUMARATE 25 MG/1
25 TABLET, FILM COATED ORAL AT BEDTIME
Status: DISCONTINUED | OUTPATIENT
Start: 2017-07-14 | End: 2017-07-18 | Stop reason: HOSPADM

## 2017-07-14 RX ADMIN — Medication 12.5 MG: at 21:51

## 2017-07-14 RX ADMIN — Medication 6.25 MG: at 15:01

## 2017-07-14 RX ADMIN — SENNOSIDES AND DOCUSATE SODIUM 1 TABLET: 8.6; 5 TABLET ORAL at 09:21

## 2017-07-14 RX ADMIN — QUETIAPINE FUMARATE 25 MG: 25 TABLET, FILM COATED ORAL at 21:52

## 2017-07-14 RX ADMIN — Medication 6.25 MG: at 09:21

## 2017-07-14 NOTE — PROGRESS NOTES
Social Work Services Progress Note        Data: SW following for Memory Care Placement.  Intervention: SW discussed case with MD who is still adjusting pt's medications due to increased agitation/delirium.  DAYTON continues to search for options for locked memory care units.  Per Angella العراقي (Liaison for [facility]: [phone])) their policy for 1:1 is that pt's need to be off 24 hours, but she will apply for an exception in cooperation with [facility] to see if they can still take pt if on 1:1 in hospital due to primary reason for 1:1 being elopement/wandering.  [facility] needs approval from cooperate and would not be able to take pt until Monday.  DAYTON also spoke with Marielle Perez ([facility] [phone]) who states the only facility in their network which has a secured dementia bed is [facility] and requested paperwork be sent to them.  SW sent paperwork to [facility] ([phone] and F:[phone]).  DAYTON spoke with Myra from [facility] who states she does not have an opening in [place], but their homes in [place] and [place] have openings and provided contact information.  DAYTON contacted Nicol at the [facility] ([phone]) and left a message requesting information on having pt assessed for admission.  DAYTON spoke with Alice at [facility] ([phone]) who asked if pt was medically stable and mobile, and responded that they would contact pt's  (who requested referral) to discuss pt's admission to the facility.  They will contact Jon over the weekend.      Plan:    Discharge Plans in Progress: Memory Care Placement    Barriers to d/c plan: 1:1, finding secured memory care, delirium.    Follow up Plan: SW to continue to follow for coordination of placement.      CANDICE Velazquez  [phone]

## 2017-07-14 NOTE — PLAN OF CARE
Problem: Individualization  Goal: Patient Preferences     VS:     VSS   Output:     Voiding without difficulty   Activity:     Up with 1 Assist.  VPM and bed alarm active. Pt occasionally gets out of bed confused.   Skin: Intact   Pain:     Denies pain.   CMS:     Denies numbness/tingling   Diet:     Regular diet.   LDA:         Equipment:     Video Patient monitoring   Plan:     DC to memory care but needs to be off 1:1 for 24 hours    Additional Info:     Oriented to self.  Pt sometimes crying and talks to self

## 2017-07-14 NOTE — PROGRESS NOTES
"CLINICAL NUTRITION SERVICES - ASSESSMENT NOTE     Nutrition Prescription    RECOMMENDATIONS FOR MDs/PROVIDERS TO ORDER:  None at this time.     Malnutrition Status:    Unable to determine due to inability to physically assess pt at this time given attempts to visit and pt busy with cares and/or sleeping.     Recommendations already ordered by Registered Dietitian (RD):  None today. Obtain weight to accurately assess weight trends.     Future/Additional Recommendations:  1. Continue to monitor PO intakes and obtain weight to accurately assess trends. If wt trends suggest weight loss or if pt consuming 50% or less of meal trays TID, recommend nutrition supplements to increase PO intake or snacks between meals. Continue to encourage PO intakes.      REASON FOR ASSESSMENT  Judith Sifuentes is a/an 56 year old female assessed by the dietitian for LOS    NUTRITION HISTORY  Pt with early onset of Alzheimer's dementia, admitted with delirium. Attempted to visit pt x 2, however pt busy with cares or sleeping. Per discussion with RN given dementia/confusion, I would likely not be able to obtain a nutrition hx.     CURRENT NUTRITION ORDERS  Diet: Regular + Room Service   Intake/Tolerance: Per chart review, pt appears to be consuming % of her meals since admission. Per RN notes, pt needs encouragement to eat at times. Pt with poor PO/appetite on 7/10, recorded pt only consumed ~50% of her meals. However, per discussion with RN today, pt has been eating well and ate a good breakfast this AM and review of chart her PO intake has improved since 7/10. Per review of [facility], pt ordering [id number] kcals/day and 73-87 g pro/day over the past three days, which is adequate to meet/exceed estimated nutrition needs.     LABS  Labs reviewed    MEDICATIONS  Medications reviewed    ANTHROPOMETRICS  Height: 154.9 cm (5'1\")   Most Recent Weight: No weight obtained this admission; most recent wt 49.9 kg on 7/7  IBW: 47.7 kg " (105#)  BMI: Normal BMI  Weight History: Per review of weight records, weight loss of 8% since January (6 months), however, per data weight has remained stable at 110# since April (x past 3 months). Unable to assess weight trends during admission given lack of weight data and unable to confirm with pt at this time.    Wt Readings from Last 10 Encounters:   07/07/17 49.9 kg (110 lb)   04/27/17 49.9 kg (110 lb)   03/10/17 51.7 kg (114 lb)   01/30/17 54.3 kg (119 lb 12.8 oz)   12/27/16 53.1 kg (117 lb)   08/01/16 51.3 kg (113 lb)   07/14/16 51.7 kg (114 lb)   05/24/16 52.2 kg (115 lb)   05/10/16 52.2 kg (115 lb)   02/24/16 50.5 kg (111 lb 6.4 oz)     Dosing Weight: 50 kg - based on current wt     ASSESSED NUTRITION NEEDS  Estimated Energy Needs: 5109-0353 kcals/day (25 - 30 kcals/kg)  Justification: Maintenance  Estimated Protein Needs: 50-60 grams protein/day (1 - 1.2 grams of pro/kg)  Justification: Maintenance of lean body mass   Estimated Fluid Needs: (1 mL/kcal)   Justification: Maintenance    PHYSICAL FINDINGS  See malnutrition section below.    MALNUTRITION  % Intake: No decreased intake noted  % Weight Loss: Up to 10% in 6 months (non-severe)  Subcutaneous Fat Loss: Unable to assess (pt covered in bed sleeping)  Muscle Loss: Unable to assess (pt covered in bed sleeping)  Fluid Accumulation/Edema: None noted per chart   Malnutrition Diagnosis: Unable to determine due to inability to physically assess pt at this time given attempts to visit and pt busy with cares and/or sleeping.     NUTRITION DIAGNOSIS  Predicted inadequate nutrient intake (calories/protein) related to hx of 8% wt loss over the past 6 months (however, wt currently stable) with potential for PO to decline with prolonged LOS/mental status.       INTERVENTIONS  Implementation  Nutrition Education: Unable to complete due to pt unavailable at this time.    Collaboration with other providers - Per discussion with RN, pt unlikely to give accurate  nutrition hx, but per her report pt eating well and ate a good breakfast this AM.     Goals  Patient to consume % of nutritionally adequate meal trays TID, or the equivalent with supplements/snacks.     Monitoring/Evaluation  Progress toward goals will be monitored and evaluated per protocol.    Rivka Pittman RD  Unit Pager: [phone]

## 2017-07-14 NOTE — PROGRESS NOTES
Medicine:    Discussed resuscitation status with patient's  Tavon Sifuentes who is primary health care agent.  Has paperwork indicating decision for pt to be DNR/DNI.  Will order.   to bring in paperwork.  Updated on need to resume sitter/dispo.

## 2017-07-14 NOTE — PROGRESS NOTES
[facility] Internal Medicine Progress Note            Interval History:   Record reviewed.   Seen with RN.  Same seroquel and zoloft dose.  Back on 1:1 this AM due to agitation, wondering out of bed.  In bed resting when seen.  Denied pain, physical concerns.  Tolerating po.  BM 7/13.             Medications:   All medications reviewed today          Physical Exam:   Blood pressure 124/64, pulse 65, temperature 98.4  F (36.9  C), temperature source Oral, resp. rate 18, last menstrual period 09/18/2013, SpO2 96 %.    Intake/Output Summary (Last 24 hours) at 07/10/17 1033  Last data filed at 07/09/17 1330   Gross per 24 hour   Intake              180 ml   Output                0 ml   Net              180 ml          General:  Alert when seen.  Confused.  Pleasant.  No distress.     Heent:      Neck:    Skin:    Chest:  clear    Cardiac:  Reg without gallop, murmur.    Abdomen:  Non distended, soft, non tender.  BS normal.    Extremities:  Perfused.  No calf, thigh swelling, discomfort, induration.     Neuro:            Data:     No results found for this or any previous visit (from the past 24 hour(s)).  UC mixed katlyn.          Assessment and Plan:   1)  Rapidly progressive early onset Alzheimer's dementia with continued periods of agitation (1;1 helpful for redirecting).   No lasting benefit from low dose seroquel.   Potential component of depression.     2)  Syncope subsequent to admission (possible decrease volume/seroquel, possible vasovagal per record)   No recurrence.  BP adequate.   Monitor for now.   3)  Gait instability. Improved stability.     PLAN:  1)  Encourage diet/po.  2)  Mobilize as tolerated with assist.    3)  Reviewed with psychiatry - increase HS seroquel to 25 mg.  Not clear if prior component of volume depletion contributed to syncope.  BP lowering effects may have diminished with time (monitor standing BP when up).  Titrate daytime dose as required/tolerated.  Same zoloft.  Update  .    Disposition Plan   Expected discharge not determined memory care unit once appropriate facility found and funding in place.      Entered: Be Thurman 07/14/2017, 1:11 PM              Attestation:  I have reviewed today's vital signs, notes, medications, labs and imaging.     Be Thurman MD

## 2017-07-14 NOTE — PLAN OF CARE
Problem: Goal Outcome Summary  Goal: Goal Outcome Summary  Outcome: No Change  Patient is confused and alert to self only, continues on 1:1 sitter for safety. VSS, LS clear. No c/o  Pain. BS present, Patient is passing gas, LBM 07/13, Voiding spontaneously in the toilet. Tolerating regular diet. Denies dizziness, SOB & CP. No IV access. Seroquel dose increased for bedtime. Needed pm Seroquel early as patient was crying and anxious, per  okay to give early. Patient was calmer after dose and more redirectable.

## 2017-07-15 PROCEDURE — 25000132 ZZH RX MED GY IP 250 OP 250 PS 637: Performed by: INTERNAL MEDICINE

## 2017-07-15 PROCEDURE — 12000001 ZZH R&B MED SURG/OB UMMC

## 2017-07-15 PROCEDURE — 25000128 H RX IP 250 OP 636: Performed by: INTERNAL MEDICINE

## 2017-07-15 PROCEDURE — 99231 SBSQ HOSP IP/OBS SF/LOW 25: CPT | Performed by: INTERNAL MEDICINE

## 2017-07-15 RX ADMIN — Medication 6.25 MG: at 20:55

## 2017-07-15 RX ADMIN — SODIUM CHLORIDE 500 ML: 900 INJECTION, SOLUTION INTRAVENOUS at 13:56

## 2017-07-15 RX ADMIN — Medication 6.25 MG: at 09:42

## 2017-07-15 NOTE — PLAN OF CARE
Problem: Goal Outcome Summary  Goal: Goal Outcome Summary  Outcome: No Change  Pt is only oriented to self. 1:1 sitter at beside. Pt wondered the halls and was anxious at the start of the shift. Pt was able to be redirected to stay on the unit. Pt calmed down when she went to bed at 2200. Pt was weighed and Orthostatic BP's done during shift, see charting. Pt denied pain. Continue plan of care.

## 2017-07-15 NOTE — PROGRESS NOTES
"[facility] Internal Medicine Progress Note            Interval History:   Record reviewed.  Seen with RN.  On seroquel 25 mg HS and 6.25 mg BID.  Continued 1:1 sitter.  Persistent periods of \"anxiety\" with wandering.  Redirectable. Generally pleasant.  In bed resting when seen.  Denied pain, physical concerns.  Tolerating po.  BM 7/14.  Standing BP 96/64.  Sitting 106 systolic.             Medications:   All medications reviewed today          Physical Exam:   Blood pressure 128/53, pulse 60, temperature 99.6  F (37.6  C), temperature source Oral, resp. rate 16, weight 49.9 kg (109 lb 14.4 oz), last menstrual period 09/18/2013, SpO2 96 %.    Intake/Output Summary (Last 24 hours) at 07/10/17 1033  Last data filed at 07/09/17 1330   Gross per 24 hour   Intake              180 ml   Output                0 ml   Net              180 ml   I/O this shift:  In: 480 [P.O.:480]  Out: -       General:  Alert when seen.  Confused.  Pleasant.  No distress.     Heent:      Neck:    Skin:    Chest:  clear    Cardiac:  Reg without gallop, murmur.    Abdomen:  Non distended, soft, non tender.  BS normal.    Extremities:  Perfused.  No calf, thigh swelling, discomfort, induration.     Neuro:            Data:     No results found for this or any previous visit (from the past 24 hour(s)).  UC mixed katlyn.          Assessment and Plan:   1)  Rapidly progressive early onset Alzheimer's dementia with continued periods where more anxious with tendency to wander.  Slow titration upward on seroquel after repeat review with psychiatry. Orthostatic BP drop as above.  Suspect potential component of vloume depletion.   2)  Syncope subsequent to admission (possible decrease volume/seroquel, possible vasovagal per record)   No recurrence.  BP as nahomi.   3)  Gait instability. Improved stability.     PLAN:  1)  Encourage diet/po.  2)  Mobilize as tolerated with assist.  3)   Same seroquel.  Gradual titration upward as clinically warranted and " BP allows.  4)  IV NS bolus,  5)  Discussed with SW frustrating issue that if on locked memory care unit periods of anxiety/wondering could be managed by staff without meds - but to get there requires to be off sitter (difficult in this setting on med surg unit) and meds not likely necessary in more spervised/closed environment.  6)  Review with  7/14.  Made DNR/DNI.   Disposition Plan   Expected discharge not determined memory care unit once appropriate facility found and funding in place.      Entered: Be Thurman 07/15/2017, 11:38 AM              Attestation:  I have reviewed today's vital signs, notes, medications, labs and imaging.     Be Thurman MD

## 2017-07-15 NOTE — PLAN OF CARE
Problem: Goal Outcome Summary  Goal: Goal Outcome Summary  Outcome: Improving  Pt is oriented to self only CMS and neuro intact,tolerated regular diet. Denied any nausea, CP, SOB, lightheadedness or dizziness. Voiding without pain or difficulty. Passing flatus. Up with SBA, 1:1 sitter in the room for pt safety, pt resting in bed at this time with call light in reach. Able to make needs known.will Continue to monitor.

## 2017-07-16 PROCEDURE — 25000132 ZZH RX MED GY IP 250 OP 250 PS 637: Performed by: INTERNAL MEDICINE

## 2017-07-16 PROCEDURE — 12000001 ZZH R&B MED SURG/OB UMMC

## 2017-07-16 PROCEDURE — 25000128 H RX IP 250 OP 636: Performed by: INTERNAL MEDICINE

## 2017-07-16 PROCEDURE — 99231 SBSQ HOSP IP/OBS SF/LOW 25: CPT | Performed by: INTERNAL MEDICINE

## 2017-07-16 RX ADMIN — QUETIAPINE FUMARATE 25 MG: 25 TABLET, FILM COATED ORAL at 00:17

## 2017-07-16 RX ADMIN — Medication 6.25 MG: at 08:17

## 2017-07-16 RX ADMIN — SODIUM CHLORIDE 500 ML: 9 INJECTION, SOLUTION INTRAVENOUS at 13:28

## 2017-07-16 RX ADMIN — SENNOSIDES AND DOCUSATE SODIUM 1 TABLET: 8.6; 5 TABLET ORAL at 08:17

## 2017-07-16 RX ADMIN — Medication 12.5 MG: at 00:15

## 2017-07-16 RX ADMIN — Medication 6.25 MG: at 16:30

## 2017-07-16 RX ADMIN — QUETIAPINE FUMARATE 25 MG: 25 TABLET, FILM COATED ORAL at 21:34

## 2017-07-16 RX ADMIN — Medication 12.5 MG: at 21:34

## 2017-07-16 RX ADMIN — SENNOSIDES AND DOCUSATE SODIUM 1 TABLET: 8.6; 5 TABLET ORAL at 21:34

## 2017-07-16 NOTE — PLAN OF CARE
Problem: Individualization  Goal: Patient Preferences  Pt Alert to self only, 1:1 beside attendant present, pt became anxious at times but is easily redirectable. Afebrile. VSS and pt given a 500ml NS Bolus over 2 hours. Lungs- Clear bilaterally with both anterior and posterior. Bowels- Hyperactive in all four quadrants. CMS and Neuro's are intact. Denies numbness and tingling in all extremities. Denies pain. Voids spontaneously without difficulty in the bathroom. Is on a Regular diet and appetite was Good this shift, food and fluids encouraged. Pt up in room and the halls with SBA. PIV is patent in the left hand and infusing NS bolus at end of shift. Bilateral heels are elevated off the bed. Pt is able to make needs known and the call light is within the pt's reach. Continue to monitor.

## 2017-07-16 NOTE — PLAN OF CARE
Problem: Goal Outcome Summary  Goal: Goal Outcome Summary  Outcome: No Change  VS:     VSS   Output:     Adequate urine output   Activity:     Gets up to the bathroom with assist of one with gait belt> ambulated in hallway X 3   Skin:     intact   Pain:     Denies pain when asked                            CMS:     Denies numbness and tingling.   Dressing:     N/A   Diet:     Regular    LDA:     IV on the left arm patent and SL   Equipment:     Gait belt    Plan:     Placement in memory care   Additional Info:     1:1 sitter at bedside. Pt agitated intermittently throughout shift and can be heard having and argumentative conversation with herself.

## 2017-07-16 NOTE — PROGRESS NOTES
"[facility] Internal Medicine Progress Note            Interval History:   Record reviewed.  Seen with RN.  On seroquel 25 mg HS and 6.25 mg BID.  Continued 1:1 sitter.  Intermittent agitation per record with \"argumentative' conversation with herself.  Redirectable. Generally pleasant.  In bed resting when seen.  Denied pain, physical concerns.  Tolerating po.  Limited intake.  BM this AM.  Standing BP 93/67.              Medications:   All medications reviewed today          Physical Exam:   Blood pressure 124/48, pulse 68, temperature 99.1  F (37.3  C), temperature source Oral, resp. rate 18, weight 49.9 kg (109 lb 14.4 oz), last menstrual period 09/18/2013, SpO2 95 %.    Intake/Output Summary (Last 24 hours) at 07/10/17 1033  Last data filed at 07/09/17 1330   Gross per 24 hour   Intake              180 ml   Output                0 ml   Net              180 ml          General:  Alert when seen.  Confused.  Pleasant.  No distress.  Observed ambulating without instability.     Heent:      Neck:    Skin:    Chest:  clear    Cardiac:  Reg without gallop, murmur.    Abdomen:  Non distended, soft, non tender.  BS normal.    Extremities:  Perfused.  No calf, thigh swelling, discomfort, induration.     Neuro:            Data:     No results found for this or any previous visit (from the past 24 hour(s)).  UC mixed katlyn.          Assessment and Plan:   1)  Rapidly progressive early onset Alzheimer's dementia with continued periods where more anxious with tendency to wander.  Slow titration upward on seroquel after repeat review with psychiatry. Orthostatic BP drop without symptoms.  Suspect potential component of vloume depletion.   2)  Syncope subsequent to admission (possible decrease volume/seroquel, possible vasovagal per record)   No recurrence.  BP as nahomi.   3)  Gait instability. Improved stability.     PLAN:  1)  Encourage diet/po.  2)  Mobilize as tolerated with assist.  3)   Same seroquel.  Gradual " titration upward as clinically warranted and BP allows - however no significant agitation/behaviors that are not redirectable.   4) Repeat  IV NS bolus,  5)  Discussed with SW frustrating issue that if on locked memory care unit periods of anxiety/wondering could be managed by staff without meds - but to get there requires to be off sitter (difficult in this setting on med surg unit) and on meds to blunt behaviors.  6)  Review with  7/14.  Made DNR/DNI.   Disposition Plan   Expected discharge not determined memory care unit once appropriate facility found and funding in place.      Entered: Be Thurman 07/16/2017, 1:02 PM              Attestation:  I have reviewed today's vital signs, notes, medications, labs and imaging.     Be Thurman MD

## 2017-07-17 PROCEDURE — 99231 SBSQ HOSP IP/OBS SF/LOW 25: CPT | Performed by: INTERNAL MEDICINE

## 2017-07-17 PROCEDURE — 12000001 ZZH R&B MED SURG/OB UMMC

## 2017-07-17 PROCEDURE — 25000132 ZZH RX MED GY IP 250 OP 250 PS 637: Performed by: INTERNAL MEDICINE

## 2017-07-17 RX ADMIN — Medication 6.25 MG: at 16:26

## 2017-07-17 RX ADMIN — Medication 12.5 MG: at 21:38

## 2017-07-17 RX ADMIN — SENNOSIDES AND DOCUSATE SODIUM 1 TABLET: 8.6; 5 TABLET ORAL at 07:41

## 2017-07-17 RX ADMIN — QUETIAPINE FUMARATE 25 MG: 25 TABLET, FILM COATED ORAL at 21:39

## 2017-07-17 RX ADMIN — Medication 6.25 MG: at 07:40

## 2017-07-17 NOTE — PLAN OF CARE
Problem: Goal Outcome Summary  Goal: Goal Outcome Summary  Outcome: No Change  VS:  VSS, Alert and confused, Pt calls out from bed, no wondering has been noted.   1:1 sitter at bedside for re-direction of agitation and wondering.    Output:  Adequate output; void spontaneously    Activity:  Ambulates with SBA and gait belt    Pain:  Denies pain    CMS:  Intact   Dressing:  none   Diet:  Tolerating regular diet; set up needed    LDA:  IV patent and SL   Equipment:  Gait belt    Plan:  DC to memory care when able, continue with current plan

## 2017-07-17 NOTE — PLAN OF CARE
Problem: Goal Outcome Summary  Goal: Goal Outcome Summary  Outcome: No Change            VS:    Stable   Output:    Voiding ok - Incontinent of bowel in bathroom today.  Please hold stool softener   Activity:    Walking about the unit steadily without assistance   Skin: WNL   Pain:    denies   CMS:    WNL   Dressing:    none   Diet:    regular   LDA:    Left PIV - patent   Equipment:    none   Plan:    D/c to memory care unit   Additional Info:    Pt remains medically stable  Sitter d/chantal this am, as Pt's behaviors have been stable. Patient is pleasant, redirectable

## 2017-07-17 NOTE — PLAN OF CARE
Problem: Confusion, Chronic (Adult)  Goal: Identify Related Risk Factors and Signs and Symptoms  Related risk factors and signs and symptoms are identified upon initiation of Human [employer] Clinical Practice Guideline (CPG)  Outcome: No Change  VS:     VSS   Output:     Adequate urine output   Activity:     Gets up to the bathroom with assist of one with gait belt> ambulated in hallway X 2   Skin:     intact   Pain:     Denies pain when asked                            CMS:     Denies numbness and tingling.   Dressing:     N/A   Diet:     Regular. Tolerating well.    LDA:     IV on the left arm patent and SL   Equipment:     Gait belt    Plan:     Placement in memory care   Additional Info:     1:1 sitter at bedside. Pt agitated intermittently throughout shift and can be heard having and argumentative conversation with herself. Verbally redirectable.

## 2017-07-17 NOTE — PROGRESS NOTES
Pt seen, case reviewed with team    Pt remains medically stable  Sitter d/chantal this am, as Pt's behaviors have been stable. She is walking about the unit steadily, without assistance, is pleasant, redirectable    VSS  BP 90s-140s/  Alert, pleasant, oriented to self  Lungs clear  CV rrr  Abd soft  No LE edema      Assessment    Dementia, with agitation, resistiveness, improved with seroquel  Behaviors stable.  Pt is appropriate for memory care unit    Plan  D/c when placement can be found, hopefully today

## 2017-07-17 NOTE — PROGRESS NOTES
Social Work Services Progress Note    Hospital Day: 11  Date of Initial Social Work Evaluation:  7/8/17  Collaborated with:  Patients , Dr. Conde, [facility]     Data:    Patient is a 56 year old female who admitted on 7/7/17. Patient is medically ready to DC.     Intervention:    SW continues to follow patient for assistance with DC planning. DAYTON received a phone call from Angella العراقي (Cell:[phone]) Jon who states that pt cannot come home as family is unable to to take care of her, so they are agreeable to pay for placement if necessary until MA is approved [phone]) at [facility] with update that the cooperate team granted approval for patient to admit to the facility even though patient has been on 1:1 d/t wandering. Angella requested that DAYTON send copy of MA application as facility needs to determine payment for patient prior to pt being admitted. Per request SW did fax a copy of pt's MA application to [facility] (Fax: [phone]). Jon who states that pt cannot come home as family is unable to to take care of her, so they are agreeable to pay for placement if necessary until MA is approved. Angella stated that patient's  would be required to pay $6000 down payment but Angella noted that if this is a huge financial hardship then [facility] care unit would be willing to look into lowering the amount for the down payment. Pt's  wants to complete paperwork for [facility] before discussing this facility further. [place] would be able to take patient upon payment.     DAYTON also spoke with pt's  Jon to provide update on the acceptance at [facility] unit. Pt's  Jon said that he would prefer patient DC to Rajiv Fernandez at [facility]. Jon stated that he had spoken with Sigrid (Ph: [phone]) at [facility] who reported that patient was accepted to facility but  that Facility and financial paperwork needs to be completed by patients  prior to acceptance at facility. Pt's  planning to meet with admissions coordinatorSigrid at [facility] to complete paperwork today. Once paperwork is processed they can look at admitting her. This SW did fax [phone]() [facility] pt's MA application. Sigrid reported that they could take patient on Thursday, however SW expressed that pt is medically ready to DC so Sigrid reported that once paperwork is turned in by pt's  then facility can admit patient. Patient has been accepted medically. Pt's , Jon is attempting to complete paperwork by tomorrow.     [facility]-  (Ph: [phone] F: [phone])  [facility]: (Ph: [phone] F: [phone])     Assessment:  Patient is not appropriate to assess at this time due to dementia. SW did speak with pt's  Jon who appeared open to speaking with SW. Pt's  expressed desire for patient to go to the memory care unit at [facility] in [place]. Pt appeared overwhelmed with trying to complete paperwork required for [facility] and noted that he was working to have it completed by tomorrow.     Plan:    Anticipated Disposition:  Facility:  [facility]    Barriers to d/c plan:  Facility has accepted patient medically but is awaiting pt's  to complete paperwork.     Follow Up:  SW will continue to follow for discharge planning and provide support as needed.     CANDICE Washington  [facility] , Coverage [address]  PH: [phone]  Pgr: [phone]

## 2017-07-18 ENCOUNTER — CARE COORDINATION (OUTPATIENT)
Dept: CARE COORDINATION | Facility: CLINIC | Age: 57
End: 2017-07-18

## 2017-07-18 VITALS
SYSTOLIC BLOOD PRESSURE: 123 MMHG | RESPIRATION RATE: 18 BRPM | BODY MASS INDEX: 20.77 KG/M2 | WEIGHT: 109.9 LBS | TEMPERATURE: 97.3 F | HEART RATE: 71 BPM | OXYGEN SATURATION: 97 % | DIASTOLIC BLOOD PRESSURE: 65 MMHG

## 2017-07-18 PROCEDURE — 25000132 ZZH RX MED GY IP 250 OP 250 PS 637: Performed by: INTERNAL MEDICINE

## 2017-07-18 PROCEDURE — 99238 HOSP IP/OBS DSCHRG MGMT 30/<: CPT | Performed by: INTERNAL MEDICINE

## 2017-07-18 RX ORDER — QUETIAPINE FUMARATE 25 MG/1
TABLET, FILM COATED ORAL
Qty: 60 TABLET | Refills: 3 | DISCHARGE
Start: 2017-07-18 | End: 2018-03-07

## 2017-07-18 RX ORDER — AMOXICILLIN 250 MG
1 CAPSULE ORAL 2 TIMES DAILY
Qty: 100 TABLET | DISCHARGE
Start: 2017-07-18 | End: 2018-10-16

## 2017-07-18 RX ORDER — QUETIAPINE FUMARATE 25 MG/1
6.25 TABLET, FILM COATED ORAL
Qty: 60 TABLET | DISCHARGE
Start: 2017-07-18 | End: 2018-03-07

## 2017-07-18 RX ORDER — SERTRALINE HYDROCHLORIDE 25 MG/1
12.5 TABLET, FILM COATED ORAL AT BEDTIME
Qty: 30 TABLET | DISCHARGE
Start: 2017-07-18 | End: 2018-03-07

## 2017-07-18 RX ADMIN — Medication 6.25 MG: at 08:03

## 2017-07-18 RX ADMIN — SENNOSIDES AND DOCUSATE SODIUM 1 TABLET: 8.6; 5 TABLET ORAL at 08:03

## 2017-07-18 NOTE — PROGRESS NOTES
Pt seen, case reviewed with team    There have been no interval medical or behavioral issues  There have been no episodes of agitation  Pt has been ambulating steadily on unit alone    VSS  BP 90s-130s/  Alert, oriented to person, pleasant  No tremor or rigidity  Lungs clear  CV rrr  Abd soft  No LE edema      Assessment    Dementia with agitation prior to admission, stable on Zoloft and seroquel.    Episode of near syncope following admission, no recurrence.  BP stable    Plan  D/c to memory care

## 2017-07-18 NOTE — PLAN OF CARE
Problem: Goal Outcome Summary  Goal: Goal Outcome Summary  Outcome: No Change  VSS.Patient sleeping during the night, awake at 0450, walking the hallway, teary, able to be distracted and redirected. Returns to bed and asleep. Repositioning independently.

## 2017-07-18 NOTE — PLAN OF CARE
Problem: Goal Outcome Summary  Goal: Goal Outcome Summary  Outcome: Adequate for Discharge Date Met:  07/18/17  Patient is discharged, spouse here to [facility] her [facility] care institute. Left at 10:45 am.

## 2017-07-18 NOTE — PLAN OF CARE
Problem: Goal Outcome Summary  Goal: Goal Outcome Summary  Outcome: No Change  Pt oriented to self only.pacing the halls most of the shift,gait steady.talking to self.able to be redirected.Voiding without difficulty.getting more anxious and agitated around 2100.seroquel and zoloft given.plan is DC to memory care unit.follow POC.

## 2017-07-18 NOTE — PROGRESS NOTES
Social Work Services Discharge Note      Patient Name:  Judith Sifuentes     Anticipated Discharge Date:  7/18/17    Discharge Disposition:   LTC:  [facility] Unit    (Ph: [phone] F: [phone])    Following MD:  Erika Davey      Pre-Admission Screening (PAS) online form has been completed.  The Level of Care (LOC) is:  Determined  Confirmation Code is:  [facility] facility so PAS is not required according to Senior linkage line   Patient/caregiver informed of referral to [facility] Line for Pre-Admission Screening for skilled nursing facility (SNF) placement and to expect a phone call post discharge from SNF.     Additional Services/Equipment Arranged:  Pt's  Jon will be providing transportation to patient to SNF.      Patient / Family response to discharge plan:  Patients  and decision maker, Jon is in agreement with discharge plan.      Persons notified of above discharge plan:  Patients , medical team, [facility]     Staff Discharge Instructions:  Please fax discharge orders and signed hard scripts for any controlled substances.  Please print a packet and send with patient.     CTS Handoff completed:  YES    Medicare Notice of Rights provided to the patient/family:  NO    CANDICE Washington , Coverage 8A  PH: [phone]  Pgr: [phone]      Additional info:     DAYTON did speak with Angella at [facility] to provide update that patient will not be coming to this facility. (Cell: [phone])

## 2017-07-18 NOTE — PROGRESS NOTES
Clinic Care Coordination Contact  Care Team Conversations    Spouse left two messages on Sw voice mail yesterday.  Regular Sw is out of the office and covering  made call to spouse.  His main concern is that he has not heard anything from the [facility] and is worried about next month's bill from "[employer].  He voiced frustrations about no one telling him anything and not getting a call back from the [facility] that they got the application.  Offered empathy for his situation and that the [facility] is not going to call each time they received paperwork.  Encouraged him to make a respectful phone call to the [facility] and ask if all the paperwork is in and an estimated time line of when he would get an answer back.  Spouse is very concerned that he is not going to have money to pay his bills due to having to pay her cost of care.      Much of the conversation was reinforcing with him to keep track of whom he talked to and about what, calling to verify that all the information is in to the [facility], and that it takes time (more then one week) to get all the answers back from the [facility].  Spouse voiced understanding of plan yet still voiced complaints that the [facility] has not called him back.     Will route to regular Sw as an FYI and for further follow up.    CANDICE Bagley, Clinic Care Coordinator 7/18/2017   8:44 AM  [phone]

## 2017-07-19 ENCOUNTER — CARE COORDINATION (OUTPATIENT)
Dept: CARE COORDINATION | Facility: CLINIC | Age: 57
End: 2017-07-19

## 2017-07-19 NOTE — PROGRESS NOTES
Clinic Care Coordination Contact  Care Team Conversations    Dayton placed call and spoke with Tavon.  Tavon shared that pt has moved into [facility] and is in a shared room.  Tvaon has been working with Alice at [phone] for pt's move in paperwork/application.    Tavon stated that he has not heard anything from [facility] yet.  DAYTON informed Tavon that the packet of paperwork he is expecting is the MA application, so there is no concern in that area, as Tavon completed the MA application with Patient , Jame Rousseau, while the pt was hospitalized at the .    Tavon is stressed out because he will not have August's rent for [facility] in the amount of $4000, but he has not called [facility] either to discuss his MA application.    DAYTON placed call to [facility], spoke with Genesis GASTELUM in adult intake, who sent me to Lorena Beltran's voicemail at [phone].  DAYTON wants to advocate on behalf of pt & family for expedited MA application & senior services.    Genesis Gibbons  Social Work Care Coordinator  [facility] & [facility]  [phone]

## 2017-07-20 ENCOUNTER — CARE COORDINATION (OUTPATIENT)
Dept: CARE COORDINATION | Facility: CLINIC | Age: 57
End: 2017-07-20

## 2017-07-20 NOTE — PROGRESS NOTES
Clinic Care Coordination Contact  Care Team Conversations    SW received phone call from Lorena [facility].  SW discussed pt's situation and learned that pt has a [facility] Financial Worker, Nohelia at [phone].  SW called and left Nohelia a message and conveyed that this writer would like to discuss pt's LTC MA application and offer assistance as needed.  Awaiting return call.    Genesis Gibbons  Social Work Care Coordinator  [facility] & [facility]  [phone]

## 2017-07-21 NOTE — DISCHARGE SUMMARY
History:  Judith Williamson is a 56-year-old female with a history of early onset Alzheimer's dementia who was admitted from home to the ER for evaluation of increased agitation, refractory to outpatient management.  The patient is living with her  who is no longer able to care for her at home secondary to her agitation and disruptive sleep-wake cycle.    The time of admission, there appeared to be no clear acute medical or metabolic cause for the patient's behavior concerns.    HOSPITAL COURSE:  The patient was admitted to the medical unit.  She was started on Seroquel at bedtime and as needed during the daytime.  The night after admission, she did have a near syncopal episode associated with hypotension.  This occurred when she was going to the bathroom.  It may have represented a vasovagal effect versus the effects of Seroquel.  The Seroquel dose was subsequently reduced.  The patient was given intravenous fluids.  The Seroquel dose ultimately was titrated back up to 25 mg at bedtime.  There were no further syncopal episodes and her vital signs were stable.      The patient's behaviors were not difficult to manage in the hospital setting though she did need supervision and constant reminders.  She initially had a one-to-one sitter which was discontinued more than 24 hours prior to discharge.  Her mental status at time of discharge is such that she is alert and oriented times person.  She is quite pleasant.  She ambulates independently about unit.  She has been eating well.  She has had no obvious side effects from Seroquel.     was involved with the patient's  in planning for discharge.  Ultimately, it is felt the patient would best be discharged to a memory care unit of a nursing home.  The patient will discharged to the [facility] in [place].    DISCHARGE DIAGNOSES:    1.  Early-onset Alzheimer disease with behavioral dyscontrol including agitation,  anxiety and apparent delusions, improved with Seroquel.    2.  Episode of near syncope on the night after admission, possibly secondary to Seroquel and volume depletion, without recurrence.    DISCHARGE MEDICATIONS:    1.  Seroquel 6.25 mg twice daily and 25 mg at bedtime.    2.  Zoloft 12.5 mg at bedtime, which is a new medication for her.    3.  Senna 1 tablet twice daily.    Discontinued Medication:  Lorazepam and memantine were discontinued during this hospitalization.      Discharge Followup:  The patient will follow up with Dr. Esme Russell in the [facility] on 09/06/2017.    Dr. Thurman did discuss goals of care with the patient's  during this hospitalization and she is DNR, DNI.        Shakeel Conde MD    D:  07/18/2017 10:52 T:  07/21/2017 09:27  Document:  [id number] DS\SP

## 2017-08-03 ENCOUNTER — TELEPHONE (OUTPATIENT)
Dept: NEUROLOGY | Facility: CLINIC | Age: 57
End: 2017-08-03

## 2017-08-03 NOTE — TELEPHONE ENCOUNTER
Reason for Call:  Other letter    Detailed comments: pt  calling stating he is needing a letter for his  stating that the pt is understand that she is giving her  power of  over her stuff.     Phone Number Patient can be reached at: Home number on file [phone] (home)    Best Time: any     Can we leave a detailed message on this number? YES    Call taken on 8/3/2017 at 3:08 PM by Olga Ivan

## 2017-08-04 NOTE — TELEPHONE ENCOUNTER
I will have to talk to our social workers and the patient's  about this on Monday. I am not sure if I can provide the letter, given that the patient is not really capable to decide at this point. The competency issue occurs through legal means and it is my understanding that in order to designate a POA the patient must be capable and competent (this should be designated prior to severe cognitive impairment)...    Please remind me to look into this next week.    CY

## 2017-08-07 ENCOUNTER — TELEPHONE (OUTPATIENT)
Dept: NEUROLOGY | Facility: CLINIC | Age: 57
End: 2017-08-07

## 2017-08-07 NOTE — TELEPHONE ENCOUNTER
Reason for Call:  Other return call     Detailed comments: Pt states he is returning Madeleine's call, no notes found in inbasket     Phone Number Patient can be reached at: Cell number on file:        Telephone Information:   Mobile [phone]   today     Best Time: today     Can we leave a detailed message on this number? YES     Call taken on 8/7/2017 at 3:16 PM by Alessandar Zheng

## 2017-08-07 NOTE — TELEPHONE ENCOUNTER
Please advise Judith's  that we are not allowed to designate POA. We do support family decision-making when it comes to Judith's healthcare, as default unless there is a Healthcare Directive that indicates otherwise, per state law. If he has any additional questions, he can contact Genesis Melvin.      Thank you,  Esme Russell

## 2017-08-11 ENCOUNTER — CARE COORDINATION (OUTPATIENT)
Dept: CARE COORDINATION | Facility: CLINIC | Age: 57
End: 2017-08-11

## 2017-08-11 NOTE — PROGRESS NOTES
Clinic Care Coordination Contact  [facility]/Voicemail    Referral Source: PCP  Clinical Data: Care Coordinator Outreach  Outreach attempted x 1.  Left message on voicemail with call back information and requested return call.  Plan: Care Coordinator mailed out care coordination introduction letter on 7/6/17. Care Coordinator will try to reach patient again in 3-5 business days.    SW also placed call to macey Pozo's [facility] Financial Assistance worker, [phone], requesting to be updated on pt's MA application.  Awaiting a return call.    Genesis Gibbons  Social Work Care Coordinator  [facility]  [phone]

## 2017-08-30 ENCOUNTER — TELEPHONE (OUTPATIENT)
Dept: FAMILY MEDICINE | Facility: CLINIC | Age: 57
End: 2017-08-30

## 2017-08-31 NOTE — TELEPHONE ENCOUNTER
[facility] - ICD 10 form request form received, completed and faxed to Jeffery Moran at [phone]

## 2017-09-01 NOTE — DISCHARGE SUMMARY
Judith Sifuentes is a 56-year-old female with advanced early onset Alzheimer's dementia who was hospitalized for evaluation of increased agitation with inability to be cared for at home with her .  There are no obvious acute medical issues or neurologic issues noted on the time of admission.  The patient was seen by Psychiatry who recommended low-dose Seroquel and Zoloft.      The patient's overall medical status was stable during this hospitalization.  She did have an episode of orthostatic near-syncope the evening after admission which may be related to medications as well as volume depletion and no further episodes of syncope, however.  The patient manifested no significant episodes of agitation, generally was redirectable.  She was independent with ambulation, oral intake. Vital signs were stable.   was involved with discharge planning as was the patient's .  Ultimately, the patient was felt to be appropriate for discharge to a memory care unit.      DISCHARGE DIAGNOSES:   1.  Advanced early-onset Alzheimer disease with increased agitation, necessitating hospitalization.   2.  Episode of near-syncope in the evening after admission, possibly secondary to medications versus volume depletion without recurrence.      DISCHARGE MEDICATIONS:   1.  Senna 1 tablet twice daily.   2.  Zoloft 12.5 mg at bedtime.   3.  Seroquel 25 mg at bedtime and 6.25 mg twice daily.      FULLOWUP:  The patient will follow up with her primary care doctor, Dr. Esme Russell on .      Goals of care were reviewed during this hospitalization with the patient's  who felt that DNR/DNI was appropriate given her advanced dementia.         AMELIA JACKSON MD             D: 2017 07:52   T: 2017 08:08   MT: NANCY      Name:     JUDITH FLORES   MRN:      [id number]        Account:        [id number]   :      1960           Admit Date:     [id number]                                   Discharge Date: 07/18/2017      Document: [id number]

## 2017-09-06 ENCOUNTER — MEDICAL CORRESPONDENCE (OUTPATIENT)
Dept: HEALTH INFORMATION MANAGEMENT | Facility: CLINIC | Age: 57
End: 2017-09-06

## 2017-09-06 ENCOUNTER — OFFICE VISIT (OUTPATIENT)
Dept: NEUROLOGY | Facility: CLINIC | Age: 57
End: 2017-09-06
Payer: COMMERCIAL

## 2017-09-06 VITALS
OXYGEN SATURATION: 97 % | WEIGHT: 109.4 LBS | SYSTOLIC BLOOD PRESSURE: 123 MMHG | HEART RATE: 72 BPM | BODY MASS INDEX: 20.67 KG/M2 | DIASTOLIC BLOOD PRESSURE: 69 MMHG

## 2017-09-06 DIAGNOSIS — F02.818 EARLY ONSET ALZHEIMER'S DISEASE WITH BEHAVIORAL DISTURBANCE (H): Primary | ICD-10-CM

## 2017-09-06 DIAGNOSIS — G30.0 EARLY ONSET ALZHEIMER'S DISEASE WITH BEHAVIORAL DISTURBANCE (H): Primary | ICD-10-CM

## 2017-09-06 PROCEDURE — 99215 OFFICE O/P EST HI 40 MIN: CPT | Performed by: PSYCHIATRY & NEUROLOGY

## 2017-09-06 RX ORDER — ACETAMINOPHEN 325 MG/1
650 TABLET ORAL EVERY 4 HOURS PRN
COMMUNITY

## 2017-09-06 RX ORDER — DOCUSATE SODIUM 100 MG/1
100 CAPSULE, LIQUID FILLED ORAL 2 TIMES DAILY PRN
COMMUNITY

## 2017-09-06 NOTE — PROGRESS NOTES
ESTABLISHED PATIENT NEUROLOGY NOTE    DATE OF VISIT: 9/6/2017  MRN: [id number]  PATIENT NAME: Judith Sifuentes  YOB: 1960    Chief Complaint   Patient presents with     RECHECK     Dementia.     SUBJECTIVE:                                                      HISTORY OF PRESENT ILLNESS:  Judith is here for follow up regarding dementia (early onset, rapidly-progressive course). Work-up for other etiologies have not yielded any specific findings to explain her impairment. I met with the patient and her family for a long family meeting that included our  about 2 months ago. We decided to start some Seroquel for hallucinations and outbursts. The day after our meeting, the patient had an increase in agitation and was sent to [facility] for inpatient stay. She was released about 11 days later on Zoloft in addition to the Seroquel. She is no longer on Aricept or Namenda, which were felt to be questionably effective previously.     Today the patient is accompanied by her  and her grown children. No recent behavioral outbursts. She continues to have hallucinations. The patient says these are not bothersome. The plan is to continue her current care at the nursing home. They are concerned about her weight, but otherwise are not aware of any issues. They have been told by staff that she is doing well.  visits a few days per week. They have had some trouble with the patient's mother coming in to see her and causing the patient some agitation. They are working on making sure that she does not visit, for the patient's sake. She tends to recognize family when they visit, though names can be hard for Judith. The patient endorses that she is happy and appetite is good.    No updates from the nursing home nor medication list. Will request these.     The patient's  has been able to return to work and is now more rested without having to take care of Judith full-time. They have  been doing paperwork and future planning in anticipation of worsening of Judith's cognition.     CURRENT MEDICATIONS:     Current Outpatient Prescriptions on File Prior to Visit:  sertraline (ZOLOFT) 25 MG tablet Take 0.5 tablets (12.5 mg) by mouth At Bedtime   QUEtiapine (SEROQUEL) 25 MG tablet 25mg (1 tab) at bedtime   QUEtiapine (SEROQUEL) 25 MG tablet Take 0.25 tablets (6.25 mg) by mouth 2 times daily (before meals)   senna-docusate (SENOKOT-S;PERICOLACE) 8.6-50 MG per tablet Take 1 tablet by mouth 2 times daily     No current facility-administered medications on file prior to visit.     RECENT DIAGNOSTIC STUDIES:    Results for orders placed or performed during the hospital encounter of 07/07/17   Glucose by meter   Result Value Ref Range    Glucose 121 (H) 70 - 99 mg/dL   Glucose by meter   Result Value Ref Range    Glucose 114 (H) 70 - 99 mg/dL   Basic metabolic panel   Result Value Ref Range    Sodium 143 133 - 144 mmol/L    Potassium 3.7 3.4 - 5.3 mmol/L    Chloride 109 94 - 109 mmol/L    Carbon Dioxide 24 20 - 32 mmol/L    Anion Gap 10 3 - 14 mmol/L    Glucose 198 (H) 70 - 99 mg/dL    Urea Nitrogen 24 7 - 30 mg/dL    Creatinine 0.73 0.52 - 1.04 mg/dL    GFR Estimate 81 >60 mL/min/1.7m2    GFR Estimate If Black >90   GFR Calc   >60 mL/min/1.7m2    Calcium 8.8 8.5 - 10.1 mg/dL   UA with Microscopic   Result Value Ref Range    Color Urine Light Yellow     Appearance Urine Clear     Glucose Urine Negative NEG mg/dL    Bilirubin Urine Negative NEG    Ketones Urine Negative NEG mg/dL    Specific Gravity Urine 1.013 1.003 - 1.035    Blood Urine Negative NEG    pH Urine 5.0 5.0 - 7.0 pH    Protein Albumin Urine Negative NEG mg/dL    Urobilinogen mg/dL Normal 0.0 - 2.0 mg/dL    Nitrite Urine Negative NEG    Leukocyte Esterase Urine Negative NEG    Source Midstream Urine     WBC Urine 1 0 - 2 /HPF    RBC Urine 0 0 - 2 /HPF    Squamous Epithelial /HPF Urine <1 0 - 1 /HPF   Basic metabolic panel    Result Value Ref Range    Sodium 144 133 - 144 mmol/L    Potassium 4.0 3.4 - 5.3 mmol/L    Chloride 108 94 - 109 mmol/L    Carbon Dioxide 25 20 - 32 mmol/L    Anion Gap 11 3 - 14 mmol/L    Glucose 104 (H) 70 - 99 mg/dL    Urea Nitrogen 24 7 - 30 mg/dL    Creatinine 0.75 0.52 - 1.04 mg/dL    GFR Estimate 79 >60 mL/min/1.7m2    GFR Estimate If Black >90   GFR Calc   >60 mL/min/1.7m2    Calcium 9.4 8.5 - 10.1 mg/dL   Psychiatry IP Consult: dementia, agitated behaviors, depressed.; Consultant may enter orders: Yes; Patient to be seen: Routine; Call back #: [phone]    Tony Melendez MD     7/12/2017  3:00 PM  Psychiatry    Chart reviewed, patient interviewed, discussed with Dr. Mcneil.    Impression: Early onset rapidly progressive Alzheimer's Disease   with agitation and mood symptoms. Concerns about syncope and   falls.    Recommendations:  1.  Consider replacing Seroquel with low dose Zyprexa if   orthostatics and falls are a consideration. Would try 1.25 mg qhs   and q 4-6 hrs prn to start and adjust as indicated.  2.  Zoloft may be helpful for depression and irritability.    Consider starting at 12.5 mg to 25 mg hs and titrate by 12.5 to   25mg per day q 2-3days to an initial dose of 50 mg and monitor.    Can go up if needed.  3.  Dictation to follow, call with questions.    Tony Saldana MD  [phone]   Urine Culture Aerobic Bacterial   Result Value Ref Range    Specimen Description Midstream Urine     Special Requests Specimen received in preservative     Culture Micro <10,000 colonies/mL urogenital katlyn     Micro Report Status FINAL 07/12/2017      REVIEW OF SYSTEMS:                                                      Patient is unable to fully participate in ROS due to cognitive impairment. She denies pain.      EXAM:                                                      Physical Exam:   Vitals: /69 (BP Location: Right arm, Patient Position: Chair, Cuff Size:  Adult Regular)  Pulse 72  Wt 109 lb 6.4 oz (49.6 kg)  LMP 09/18/2013  SpO2 97%  BMI 20.67 kg/m2  BMI= Body mass index is 20.67 kg/(m^2).  GENERAL: NAD.   Neurologic:  MENTAL STATUS: Alert, somewhat attentive. Requires lots of cues but is redirectable. Speech is fluent. Names of family correct 2/3 and naming of pen and its function intact.   CRANIAL NERVES: Pupils equally, round and reactive to light. Facial sensation and movement normal. EOM full. Hearing intact to conversation. Trapezius strength intact. Palate moves symmetrically. Tongue midline.  MOTOR:  strength is strong. Tone slightly increased at times and bulk normal.   DTRs: Intact and symmetric.     SENSATION: Unable to reliably test - appears normal to light touch.   COORDINATION: Incoordinated movements with some apaxia and impulsive movements.   STATION AND GAIT: Apraxic gait.  CV: RRR. S1, S2.   NECK: No bruits.  Weight is stable.       ASSESSMENT and PLAN:                                                      Assessment and Plan :    ICD-10-CM    1. Early onset Alzheimer's disease with behavioral disturbance G30.0     F02.81        Ms. Denys Sifuentes is a pleasant 57 yo woman with early-onset dementia, likely Alzheimer's. She has developed have some apraxia but appears stable. The behavioral outbursts seem to be well-controlled on her current medications, though I am not sure exactly what the nursing home is giving her. We have requested the records. The patient appears to be happy and family is overall happy with her current care. We discussed future planning and prognosis. They have been working on this. We will plan for 6-month follow-up.     Total Time: 40 minutes were spent with the patient and her family. More than 50% of the time spent on counseling (as described above in Assessment and Plan) /coordinating the care.    Esme Russell MD  Neurology

## 2017-09-06 NOTE — NURSING NOTE
"Chief Complaint   Patient presents with     RECHECK     Dementia.       Initial /69 (BP Location: Right arm, Patient Position: Chair, Cuff Size: Adult Regular)  Pulse 72  LMP 09/18/2013  SpO2 97% Estimated body mass index is 20.77 kg/(m^2) as calculated from the following:    Height as of 3/10/17: 5' 1\" (1.549 m).    Weight as of 7/15/17: 109 lb 14.4 oz (49.9 kg).  Medication Reconciliation: completed-- late entry 09/06/17 1:08 PM    Madeleine BETANCUR-BOBBY    "

## 2017-09-19 ENCOUNTER — CARE COORDINATION (OUTPATIENT)
Dept: CARE COORDINATION | Facility: CLINIC | Age: 57
End: 2017-09-19

## 2017-09-19 ENCOUNTER — HOSPITAL ENCOUNTER (EMERGENCY)
Facility: CLINIC | Age: 57
Discharge: HOME | End: 2017-09-19
Attending: INTERNAL MEDICINE | Admitting: INTERNAL MEDICINE
Payer: COMMERCIAL

## 2017-09-19 ENCOUNTER — TELEPHONE (OUTPATIENT)
Dept: NEUROLOGY | Facility: CLINIC | Age: 57
End: 2017-09-19

## 2017-09-19 VITALS
DIASTOLIC BLOOD PRESSURE: 52 MMHG | RESPIRATION RATE: 16 BRPM | TEMPERATURE: 97.4 F | OXYGEN SATURATION: 98 % | HEART RATE: 86 BPM | SYSTOLIC BLOOD PRESSURE: 114 MMHG

## 2017-09-19 DIAGNOSIS — F03.90 DEMENTIA WITHOUT BEHAVIORAL DISTURBANCE, UNSPECIFIED DEMENTIA TYPE: ICD-10-CM

## 2017-09-19 DIAGNOSIS — R44.3 HALLUCINATIONS: ICD-10-CM

## 2017-09-19 PROCEDURE — 99284 EMERGENCY DEPT VISIT MOD MDM: CPT | Mod: Z6 | Performed by: INTERNAL MEDICINE

## 2017-09-19 PROCEDURE — 99283 EMERGENCY DEPT VISIT LOW MDM: CPT | Performed by: INTERNAL MEDICINE

## 2017-09-19 PROCEDURE — 25000132 ZZH RX MED GY IP 250 OP 250 PS 637: Performed by: INTERNAL MEDICINE

## 2017-09-19 RX ORDER — OLANZAPINE 2.5 MG/1
1.25 TABLET, FILM COATED ORAL EVERY 4 HOURS PRN
Qty: 30 TABLET | Refills: 0 | Status: SHIPPED | OUTPATIENT
Start: 2017-09-19 | End: 2018-03-07

## 2017-09-19 RX ADMIN — OLANZAPINE 1.25 MG: 2.5 TABLET, FILM COATED ORAL at 10:45

## 2017-09-19 ASSESSMENT — ENCOUNTER SYMPTOMS
FEVER: 0
SHORTNESS OF BREATH: 0
ABDOMINAL PAIN: 0
AGITATION: 1
CONFUSION: 1

## 2017-09-19 NOTE — TELEPHONE ENCOUNTER
Reason for Call:  Patient  is calling in states that he received call from nursing home stating that she is uncontrollable and the nursing home needs recommendations/advice    Detailed comments: please contact the nursing home at [phone] and ask for Sigrid    Phone Number Patient can be reached at: [phone]    Best Time:  would also like to be notified of what recommendations/plan of treatment    Can we leave a detailed message on this number? YES    Call taken on 9/19/2017 at 7:35 AM by Carolann Peterson
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
## 2017-09-19 NOTE — ED AVS SNAPSHOT
[facility], Emergency Department    [address]    [place] [address]    Phone:  [phone]    Fax:  [phone]                                       Judith Sifuentes   MRN: [id number]    Department:  [facility], Emergency Department   Date of Visit:  9/19/2017           Patient Information     Date Of Birth          1960        Your diagnoses for this visit were:     Hallucinations     Dementia without behavioral disturbance, unspecified dementia type        You were seen by Luis Jacome MD.        Discharge Instructions         For Caregivers: Daily Care for Dementia Patients     Gardening can be a pleasant way to keep your loved one active.   Over time, people with dementia will need more and more help with daily tasks. These include eating meals, taking medicines, and getting enough exercise. They also include personal care needs, such as bathing and dressing. To reduce stress, make these activities part of a routine. Ask family and friends to lend a hand. And be aware that your loved one s abilities can change from day to day. If you have problems meeting your loved one s needs, it s time to get help. Talk to a  or local support agency--such as a local Alzheimer s Association chapter.   Activity and exercise  Regular activity is good for your loved one s body and mind. It may even help slow the progression of the disease. Keep to your loved one s old routines when possible. It also helps to:    Do things together. Go for a walk, garden, or bake a cake. Basic, repetitive activities are good choices.    Be active as often as possible. This releases pent-up energy, which can reduce restlessness and improve sleep.    Include social activities. Take your loved one to see friends and family. But try to keep things simple. Loud noises, crowds, or too many people talking at once can be upsetting.  Taking medicines  Be sure all prescribed medicines are taken as directed.  These tips can help:    Provide supervision if your loved one cannot safely take medicines alone.    Set a routine so medicines are taken at the same time each day.    Ensure that ALL medicines are taken. A pillbox can help you keep track.    Plan ahead. Be sure to refill prescriptions before they run out.  Eating meals  At mealtime, serve healthy foods with plenty of fluids. These tips can also help:    Keep meals simple. Too many choices can be overwhelming. Try to maintain a calm, quiet atmosphere while you eat.    Place healthy snacks, such as fresh fruit, out where they can be seen.    Watch eating habits. People with dementia may eat too little or too much. Talk to the healthcare provider if you have concerns.    Try finger foods if regular meals become too difficult for your loved one to eat.  Dressing  People with dementia may have trouble choosing what to wear. It s OK if clothes don t always match. But if help is needed:    Choose clothing that is easy to put on and take off. Use Velcro shoes or slippers.    Lay out a fresh outfit each day. Place clothes in the order they should be put on.    If more help is needed, hand over clothing items one at a time. Explain how each item should be put on.    Put dirty clothes away so they re not worn again.  Bathing and grooming  Getting your loved one to bathe can be a real challenge. Try these tips:    Treat bathing as a routine activity. But be flexible. A daily bath is probably unrealistic.    Prepare bath items ahead of time, and be sure to test the water temperature.    Avoid leaving your loved one alone in the bath or shower.               Try visiting a barbershop or [employer] salon for help with hair washing, hair styling, and shaving.  Using the toilet  In later stages, dementia patients may develop incontinence (trouble controlling the bladder or bowel). To ease problems:    Set a routine for using the toilet (for example, every 3 hours) and stick to  it.    Limit beverages before bedtime to prevent accidents. A bedside commode may also help.    Be understanding if accidents happen. Your loved one may be as upset as you.    At one point it may be necessary to have them wear an adult diaper.      Talk to a healthcare provider if incontinence develops suddenly. It may signal other health issues that can be treated.  When to call the healthcare provider  Call the healthcare provider if you notice a sudden change in your loved one s behavior or emotions. These changes may be due to dementia. But they could also signal other health problems that can be treated.   Date Last Reviewed: 10/2/2015  NOTE:This sheet is a summary. It may not cover all possible information. If you have questions about this medicine, talk to your doctor, pharmacist, or health care provider. Copyright  2017 Gold Standard      Please make an appointment to follow up with Your Neurologist and Your Primary Care Provider as soon as possible even if entirely better.     Future Appointments        Provider Department Dept Phone Center    3/7/2018 10:15 AM Esme Russell MD [facility] [phone] [facility]      24 Hour Appointment Hotline       To make an appointment at any [facility], call [phone] ([phone]). If you don't have a family doctor or clinic, we will help you find one. [facility] are conveniently located to serve the needs of you and your family.             Review of your medicines      START taking        Dose / Directions Last dose taken    OLANZapine 2.5 MG tablet   Commonly known as:  zyPREXA   Dose:  1.25 mg   Quantity:  30 tablet        Take 0.5 tablets (1.25 mg) by mouth every 4 hours as needed   Refills:  0          Our records show that you are taking the medicines listed below. If these are incorrect, please call your family doctor or clinic.        Dose / Directions Last dose taken    docusate sodium 100 MG capsule   Commonly known as:  COLACE         Take by mouth 2 times daily as needed for constipation   Refills:  0        * QUEtiapine 25 MG tablet   Commonly known as:  SEROQUEL   Quantity:  60 tablet        25mg (1 tab) at bedtime   Refills:  3        * QUEtiapine 25 MG tablet   Commonly known as:  SEROquel   Dose:  6.25 mg   Quantity:  60 tablet        Take 0.25 tablets (6.25 mg) by mouth 2 times daily (before meals)   Refills:  0        senna-docusate 8.6-50 MG per tablet   Commonly known as:  SENOKOT-S;PERICOLACE   Dose:  1 tablet   Quantity:  100 tablet        Take 1 tablet by mouth 2 times daily   Refills:  0        sertraline 25 MG tablet   Commonly known as:  ZOLOFT   Dose:  12.5 mg   Quantity:  30 tablet        Take 0.5 tablets (12.5 mg) by mouth At Bedtime   Refills:  0        TYLENOL 325 MG tablet   Dose:  650 mg   Generic drug:  acetaminophen        Take 650 mg by mouth 4 times daily as needed for mild pain   Refills:  0        * Notice:  This list has 2 medication(s) that are the same as other medications prescribed for you. Read the directions carefully, and ask your doctor or other care provider to review them with you.            Prescriptions were sent or printed at these locations (1 Prescription)                   Other Prescriptions                Printed at Department/Unit printer (1 of 1)         OLANZapine (ZYPREXA) 2.5 MG tablet                Orders Needing Specimen Collection     None      Pending Results     No orders found from 9/17/2017 to 9/20/2017.            Pending Culture Results     No orders found from 9/17/2017 to 9/20/2017.            Pending Results Instructions     If you had any lab results that were not finalized at the time of your Discharge, you can call the ED Lab Result RN at [phone]. You will be contacted by this team for any positive Lab results or changes in treatment. The nurses are available 7 days a week from 10A to 6:30P.  You can leave a message 24 hours per day and they will return your call.    "     Thank you for choosing [facility]       Thank you for choosing [facility] for your care. Our goal is always to provide you with excellent care. Hearing back from our patients is one way we can continue to improve our services. Please take a few minutes to complete the written survey that you may receive in the mail after you visit with us. Thank you!        [email or web address] Information     [employer] lets you send messages to your doctor, view your test results, renew your prescriptions, schedule appointments and more. To sign up, go to [email or web address] . Click on \"Log in\" on the left side of the screen, which will take you to the Welcome page. Then click on \"Sign up Now\" on the right side of the page.     You will be asked to enter the access code listed below, as well as some personal information. Please follow the directions to create your username and password.     Your access code is: [email or web address]  Expires: 2017  3:22 PM     Your access code will  in 90 days. If you need help or a new code, please call your [facility] clinic or [phone].        Care EveryWhere ID     This is your Care EveryWhere ID. This could be used by other organizations to access your [facility] medical records  [id number]        Equal Access to Services     DARRYL GUERRA : Anuja Gillis, waaxda luqadaha, qaybta kaalmada adejuanayada, ayaan copeland. So [facility] [phone].    ATENCIÓN: Si habla español, tiene a mcclendon disposición servicios gratuitos de asistencia lingüística. Llame al [phone].    We comply with applicable federal civil rights laws and [place] laws. We do not discriminate on the basis of race, color, national origin, age, disability sex, sexual orientation or gender identity.            After Visit Summary       This is your record. Keep this with you and show to your community pharmacist(s) and doctor(s) at your next visit.                  "

## 2017-09-19 NOTE — ED NOTES
Bed: ED11  Expected date: 9/19/17  Expected time: 9:31 AM  Means of arrival: Ambulance  Comments:  N 302  56yF increased hallucinations

## 2017-09-19 NOTE — TELEPHONE ENCOUNTER
Called and let them know that Psychiatry managed her Mental Health meds  When inpatient for 11 days;  and should contact them or should be following up with them.  They were not aware she had seen one and they were not aware of who Dr Russell was.  Per DC notes from hospital,  Stated follow up with Primary Medical Doctor Dr Russell. They were not aware Dr Russell was Neurology.    As they had not checked the prescription orders either.    Cherelle Cobian RN  [facility]

## 2017-09-19 NOTE — TELEPHONE ENCOUNTER
Pt spouse calling stating nursing home is sending pt to the [facility]. He would like to talk to Dr. Russell about this and her recommendations or advise    Please call Tavon at home #   Olga Ivan  Specialty CSS

## 2017-09-19 NOTE — PROGRESS NOTES
Clinic Care Coordination Contact  Care Team Conversations    SW received phone call/voice message from Sigrid RN at [facility] this morning.  This worker was out of the office at a staff meeting, so unable to respond until later in the day.  However, Sigrid shared that the pt was having outbursts towards staff and other residents and wanted to avoid sending the pt to the the emergency room via an ambulance.  Sigrid was requesting assistance from this writer with getting the pt seen by PCP or Psych services STAT.    DAYTON reviewed pt's EMR and discovered that Sigrid & pt's spouse contacted Dr. uRssell's office (pt's Neurologist) who shared that previously pt was seen and managed with medications by Psych while hospitalized.  Recommended that pt be seen at [facility] for an evaluation.    DAYTON called and left a message for Sigrid, informing that I received her phone call, that I saw she spoke with [facility] ED staff and offered assistance as needed.  Pt was discharged from [facility] ED at 12:46 PM    DAYTON also placed call to pt's spouse, Tavon, requesting a return call.   Tavon has not returned this writer's previous phone call.    Genesis Pagan  Social Work Care Coordinator  [facility]  [phone]

## 2017-09-19 NOTE — TELEPHONE ENCOUNTER
I spoke with Tavon this afternoon.  He said Judith is in-patient at the [facility].  He's not sure how long she'll be there-- she may be discharged tonight or tomorrow morning.  They're adjusting meds to see if they can calm her down.  Tavon said he could see something was brewing with her.  On Sunday she was a bit agitated, but calmed down shortly.      He's waiting to hear from the [facility].  He'd like to speak with Dr. Russell Wednesday afternoon, if possible.  He'll call if there is a more urgent need between now and then.

## 2017-09-19 NOTE — ED AVS SNAPSHOT
[facility], Emergency Department    [address]    [facility] [address]    Phone:  [phone]    Fax:  [phone]                                       Judith Sifuentes   MRN: [id number]    Department:  [facility], Emergency Department   Date of Visit:  9/19/2017           After Visit Summary Signature Page     I have received my discharge instructions, and my questions have been answered. I have discussed any challenges I see with this plan with the nurse or doctor.    ..........................................................................................................................................  Patient/Patient Representative Signature      ..........................................................................................................................................  Patient Representative Print Name and Relationship to Patient    ..................................................               ................................................  Date                                            Time    ..........................................................................................................................................  Reviewed by Signature/Title    ...................................................              ..............................................  Date                                                            Time

## 2017-09-19 NOTE — ED NOTES
Contacted Sigrid at Pt's care facility [facility]. Gave report. Pt is calm, cooperative. Has new Rx for Zyprexa.

## 2017-09-19 NOTE — DISCHARGE INSTRUCTIONS
For Caregivers: Daily Care for Dementia Patients     Gardening can be a pleasant way to keep your loved one active.   Over time, people with dementia will need more and more help with daily tasks. These include eating meals, taking medicines, and getting enough exercise. They also include personal care needs, such as bathing and dressing. To reduce stress, make these activities part of a routine. Ask family and friends to lend a hand. And be aware that your loved one s abilities can change from day to day. If you have problems meeting your loved one s needs, it s time to get help. Talk to a  or local support agency--such as a local Alzheimer s Association chapter.   Activity and exercise  Regular activity is good for your loved one s body and mind. It may even help slow the progression of the disease. Keep to your loved one s old routines when possible. It also helps to:    Do things together. Go for a walk, garden, or bake a cake. Basic, repetitive activities are good choices.    Be active as often as possible. This releases pent-up energy, which can reduce restlessness and improve sleep.    Include social activities. Take your loved one to see friends and family. But try to keep things simple. Loud noises, crowds, or too many people talking at once can be upsetting.  Taking medicines  Be sure all prescribed medicines are taken as directed. These tips can help:    Provide supervision if your loved one cannot safely take medicines alone.    Set a routine so medicines are taken at the same time each day.    Ensure that ALL medicines are taken. A pillbox can help you keep track.    Plan ahead. Be sure to refill prescriptions before they run out.  Eating meals  At mealtime, serve healthy foods with plenty of fluids. These tips can also help:    Keep meals simple. Too many choices can be overwhelming. Try to maintain a calm, quiet atmosphere while you eat.    Place healthy snacks, such as fresh fruit, out  where they can be seen.    Watch eating habits. People with dementia may eat too little or too much. Talk to the healthcare provider if you have concerns.    Try finger foods if regular meals become too difficult for your loved one to eat.  Dressing  People with dementia may have trouble choosing what to wear. It s OK if clothes don t always match. But if help is needed:    Choose clothing that is easy to put on and take off. Use Velcro shoes or slippers.    Lay out a fresh outfit each day. Place clothes in the order they should be put on.    If more help is needed, hand over clothing items one at a time. Explain how each item should be put on.    Put dirty clothes away so they re not worn again.  Bathing and grooming  Getting your loved one to bathe can be a real challenge. Try these tips:    Treat bathing as a routine activity. But be flexible. A daily bath is probably unrealistic.    Prepare bath items ahead of time, and be sure to test the water temperature.    Avoid leaving your loved one alone in the bath or shower.               Try visiting a [employer] or [employer] salon for help with hair washing, hair styling, and shaving.  Using the toilet  In later stages, dementia patients may develop incontinence (trouble controlling the bladder or bowel). To ease problems:    Set a routine for using the toilet (for example, every 3 hours) and stick to it.    Limit beverages before bedtime to prevent accidents. A bedside commode may also help.    Be understanding if accidents happen. Your loved one may be as upset as you.    At one point it may be necessary to have them wear an adult diaper.      Talk to a healthcare provider if incontinence develops suddenly. It may signal other health issues that can be treated.  When to call the healthcare provider  Call the healthcare provider if you notice a sudden change in your loved one s behavior or emotions. These changes may be due to dementia. But they could also signal other  health problems that can be treated.   Date Last Reviewed: 10/2/2015  NOTE:This sheet is a summary. It may not cover all possible information. If you have questions about this medicine, talk to your doctor, pharmacist, or health care provider. Copyright  2017 Gold Standard      Please make an appointment to follow up with Your Neurologist and Your Primary Care Provider as soon as possible even if entirely better.

## 2017-09-20 ENCOUNTER — CARE COORDINATION (OUTPATIENT)
Dept: CARE COORDINATION | Facility: CLINIC | Age: 57
End: 2017-09-20

## 2017-09-20 DIAGNOSIS — G30.0 EARLY ONSET ALZHEIMER'S DISEASE WITH BEHAVIORAL DISTURBANCE (H): Primary | ICD-10-CM

## 2017-09-20 DIAGNOSIS — F02.818 EARLY ONSET ALZHEIMER'S DISEASE WITH BEHAVIORAL DISTURBANCE (H): Primary | ICD-10-CM

## 2017-09-20 NOTE — TELEPHONE ENCOUNTER
L/M on voice mail at home number for Judith's . Asked him to call back if he needs additional support from our end.    Esme Russell

## 2017-09-20 NOTE — PROGRESS NOTES
Agree with more supervised living setting such as a memory care unit. It looks like the ER provider yesterday started her on Zyprexa every 4 hours as needed. I would like to see the patient in a face-to-face office visit and get reports from her staff to determine if this treatment plan is effective, or if another medication needs to be started. It would be helpful for her to see Ilana Lopez, referral place

## 2017-09-20 NOTE — PROGRESS NOTES
Clinic Care Coordination Contact  [facility]/Voicemail    Referral Source: Specialty Provider, Neurologist, MD Russell    Clinical Data: Care Coordinator Outreach    Outreach attempted x 2 to pt's Assisted Living RN, Sigrid at [phone].  Sw left message on voicemail with call back information and requested return call to discuss pt's plan of care for pt.    Plan: [place] has discussed with MD Russell that pt's Assisted Living facility may need orders from the pt's medical team in order to better manage pt's behaviors; such as having a plan of care with medicaitons on hand at the TO to calm the patient during her crisis.  Also discussed that when pt is becoming more difficult to manage at her skilled nursing, the staff should call the PCP office or Urgent Care for guidance and not Neurologist MD clinic.  Discussed having mental health referral with Ilana anand for pt's ongoing needs of behavioral health medications, if needed    SW routed note to PCP for review and discussion.    Genesis Gibbons  Social Work Care Coordinator  [facility]  [phone]

## 2017-09-22 NOTE — PROGRESS NOTES
Clinic Care Coordination Contact  [facility]/Voicemail    Referral Source: PCP  Clinical Data: Care Coordinator Outreach  Outreach attempted x 3, Sigrid did leave a message on voice mail of Genesis Gibbons who is out of office today.  Left message on voicemail with call back information and requested return call.  Plan:  Care Coordinator will pass back to Genesis for follow up,  Will remain available if Sigrid returns call.  CANDICE Bagley, Clinic Care Coordinator 9/22/2017   8:56 AM  [phone]

## 2017-10-17 ENCOUNTER — TELEPHONE (OUTPATIENT)
Dept: NEUROLOGY | Facility: CLINIC | Age: 57
End: 2017-10-17

## 2017-10-17 NOTE — TELEPHONE ENCOUNTER
Reason for Call:  Other call back    Detailed comments: merry calling from [place] stating they have been seeing some mental health issues going on w/ the pt. They are sending her to [facility]. Need to know which medications she has tried and failed for dementia and a diagnosis if there is one. Please fax to [phone]    Phone Number Patient can be reached at: Other phone number:  [phone]*    Best Time: any     Can we leave a detailed message on this number? YES    Call taken on 10/17/2017 at 11:29 AM by Olga Ivan

## 2017-10-17 NOTE — LETTER
[facility]  [address] [address]  [phone]          2017    Hansel Barnard MD  [facility]    Regarding Judith Sifuetnes   56      Dear Dr. Barnard,    Ada Sifuentes is a mutual patient you're currently seeing at [facility] in [place].  We've recently received a letter from [employer], asking about information regarding Ms. Denys Sifuentes's capacity to sign legal documents.  Dr. Russell hasn't examined Ms. Denys Sifuentes since 17.  Since then this patient has apparently been to the emergency room, moved to [facility], and most recently been evaluated in [facility].  In light of all these changes in her status, Dr. Russell doesn't feel she's the best one to address the question this law office is presenting.      I've enclosed the letter.  Please let me know if this is something you'd be willing to address this.      Thank you so much for your care of Judith.        Sincerely,     HOLA Mooney  For Esme Russell MD  [phone], fax [phone], email [email or web address]

## 2017-10-17 NOTE — TELEPHONE ENCOUNTER
Reason for Call:  Other call back    Detailed comments: caryn calling from [place] stating she would like to talk to Dr. Russell about the legal capacity of pt signing papers for  to be POA.     Phone Number Patient can be reached at: Other phone number:  [phone] - Caryn*    Best Time: any     Can we leave a detailed message on this number? YES    Call taken on 10/17/2017 at 2:59 PM by Olga Ivan

## 2017-10-17 NOTE — TELEPHONE ENCOUNTER
Letter with medication history faxed to [facility] at [phone].  Transmission confirmed via Right Fax.

## 2017-10-17 NOTE — LETTER
[facility]  [address] [address]  [phone]          2017        Regarding Judith Sifuentes  : 56      Here are the medications Dr. Russell has tried with Ms. Denys Sifuentes as taken from her medical chart, confirmed by Dr. Russell:    5/10/16 She was started on Aricept 5mg once daily    16 She had the Aricept increased to 10mg daily    16  Namenda was added at 5mg daily    17 Namenda was increased to 5mg twice daily    17 The Aricept was discontinued, as it didn't seem effective    17 Namenda continued at 5mg twice daily     17 Seroquel started at 25mg daily, with an additional 12.5mg during the day as needed    17 Ms. Denys Sifuentes was discharged from the hospital with having discontinued Namenda, Seroquel was changed to 25mg at bedtime and 6.25mg daily (changes were made by her inpatient providers).      17 Ms. Denys Sifuentes was in the ER, where she was started on Zyprexa 1.25 mg every 4 hours, as needed.

## 2017-10-18 NOTE — TELEPHONE ENCOUNTER
I've faxed a letter and the form to patient's primary at [facility], Hansel Barnard MD with [facility]-- asking if he'd be willing to give his opinion.  Dr. Russell hasn't seen the patient since before her ER visit, her entry into [facility], and now her evaluation at [facility].  I've asked him to let us know whether or not he's willing to do that.  Fax is [phone].  Transmission confirmed via Right Fax.

## 2017-10-19 NOTE — TELEPHONE ENCOUNTER
I spoke with Caryn this morning.  I advised her that Dr. Russell hasn't seen Judith since September, and it appears there have been changes to her living arrangements, her medications and her mental state since that time.  I advised her that Hansel Barnard has been following Judith in [place] and that I'd faxed a letter to him, asking him to consider making the determination the law firm is asking.  Caryn was aware of Dr. Barnard, but has been unable to reach him.  She will try reaching him again.  I advised that if he's unable or unwilling, Dr. Russell would have to see Judith or speak to her  in order to make the determination.  That being said, Caryn said they're actually asking if there's a time when Judith is typically lucid and able to sign legal documents.  Surely Kit and Dr. Barnard would be best able to answer that.    For the time-being we will disregard the request.

## 2017-10-19 NOTE — TELEPHONE ENCOUNTER
Caryn called back this morning.  She stated that she had misspoken earlier.  What they actually need from Dr. Russell is a note stating whether she felt Judith had the capacity to understand what she was signing back in April when she signed the POA.      Note can be faxed to Caryn at [facility] [employer] [facility] [phone].  Phone [phone].

## 2017-10-20 NOTE — TELEPHONE ENCOUNTER
Received another call from the [email or web address] regarding letter from Dr. Russell regarding mental status. Please advise.    Ciera Ruiz  [facility] [place]

## 2017-10-23 ENCOUNTER — TELEPHONE (OUTPATIENT)
Dept: NEUROLOGY | Facility: CLINIC | Age: 57
End: 2017-10-23

## 2017-10-23 NOTE — TELEPHONE ENCOUNTER
Jon called back this afternoon after Dr. Russell had left for the day.  He relayed to me that [employer] is contesting the POA, saying that Judith was not lucid enough to understand when she was signing the papers in August.  Because of that, Jon's been unable to use the money in the account to pay Judith's bills.  He said that the day they signed the papers she was understanding of what they were doing.  Their oldest son was along, and a notary was there.      Jon would be able to take a short call around 11:30am tomorrow, or after 5pm.

## 2017-10-23 NOTE — TELEPHONE ENCOUNTER
Caryn calling from the [employer] and is requesting a letter from Dr. Russell that back on 08-22-17 Judith had the mental capacity and knew what legal document that she was signing.  Caryn can be reached at [phone] and to fax this letter to [phone].  Caryn is requesting this letter to be done today.    Eve Valle  [facility] RN

## 2017-10-23 NOTE — TELEPHONE ENCOUNTER
"I received a call from Caryn from [employer].  She said that she needs to get the letter in question from Dr. Russell today or they will need to \"pursue a different direction\".  I explained that Dr. Russell has gone for the day and that I know she's been exploring and researching to give the best determination.  I asked if tomorrow would be acceptable.  Caryn told me that the last few conversations she's had with our office have left her feeling very insecure about whether the letter will be written and when it will be sent to them.  That being the case, she said she's hesitant to push out the deadline.  I explained that I can't give her a guarantee that the letter would arrive at their office tomorrow, as I can't speak for Dr. Russell.  Caryn said that they were then \"forced to try another avenue\" and ended the phone call.  "

## 2017-10-24 NOTE — TELEPHONE ENCOUNTER
If they are being this forceful, I think we should just hold off. I tried to call the patient's  to discuss yesterday and have not heard back. I do not want to get caught up in any legal proceedings, especially without understanding what is going on, what the request is, etc. I am happy to help support the family as Judith's physician outside of the legal issues.     Esme Russell

## 2017-11-17 ENCOUNTER — HOSPITAL ENCOUNTER (EMERGENCY)
Facility: CLINIC | Age: 57
Discharge: HOME | End: 2017-11-17
Attending: EMERGENCY MEDICINE | Admitting: EMERGENCY MEDICINE
Payer: COMMERCIAL

## 2017-11-17 ENCOUNTER — CARE COORDINATION (OUTPATIENT)
Dept: CARE COORDINATION | Facility: CLINIC | Age: 57
End: 2017-11-17

## 2017-11-17 VITALS
DIASTOLIC BLOOD PRESSURE: 88 MMHG | OXYGEN SATURATION: 94 % | WEIGHT: 99 LBS | SYSTOLIC BLOOD PRESSURE: 139 MMHG | TEMPERATURE: 97.9 F | BODY MASS INDEX: 18.71 KG/M2

## 2017-11-17 DIAGNOSIS — K56.41 IMPACTED STOOL IN RECTUM (H): ICD-10-CM

## 2017-11-17 DIAGNOSIS — F03.90 DEMENTIA WITHOUT BEHAVIORAL DISTURBANCE, UNSPECIFIED DEMENTIA TYPE: ICD-10-CM

## 2017-11-17 DIAGNOSIS — K59.01 SLOW TRANSIT CONSTIPATION: ICD-10-CM

## 2017-11-17 PROCEDURE — 99283 EMERGENCY DEPT VISIT LOW MDM: CPT | Performed by: EMERGENCY MEDICINE

## 2017-11-17 PROCEDURE — 99284 EMERGENCY DEPT VISIT MOD MDM: CPT | Mod: Z6 | Performed by: EMERGENCY MEDICINE

## 2017-11-17 PROCEDURE — 25000132 ZZH RX MED GY IP 250 OP 250 PS 637: Performed by: EMERGENCY MEDICINE

## 2017-11-17 RX ORDER — BISACODYL 10 MG
10 SUPPOSITORY, RECTAL RECTAL ONCE
Status: COMPLETED | OUTPATIENT
Start: 2017-11-17 | End: 2017-11-17

## 2017-11-17 RX ADMIN — BISACODYL 10 MG: 10 SUPPOSITORY RECTAL at 02:14

## 2017-11-17 NOTE — ED NOTES
Pt denies pain at this time.  Pt has dementia at baseline.  Staff at nursing home states that patient was sitting on toilet tonight and was straining to have BM.  Pt c/o pain at rectum.  Pt abd soft.  MD at bedside.

## 2017-11-17 NOTE — DISCHARGE INSTRUCTIONS
Continue current medication  Current medications are contributing to constipation  Switch to Colace dosing 100 mg twice daily on a scheduled basis not PRN.  Milk of magnesia 30-60 cc daily ×7 days / May stop sooner for diarrhea.  Increase fiber in diet

## 2017-11-17 NOTE — ED AVS SNAPSHOT
[facility] Emergency Department    [address] [address]    Phone:  [phone]    Fax:  [phone]                                       Judith Sifuentes   MRN: [id number]    Department:  [facility] Emergency Department   Date of Visit:  11/17/2017           Patient Information     Date Of Birth          1960        Your diagnoses for this visit were:     Dementia without behavioral disturbance, unspecified dementia type     Slow transit constipation     Impacted stool in rectum (H)        You were seen by Shakeel Samano DO.        Discharge Instructions       Continue current medication  Current medications are contributing to constipation  Switch to Colace dosing 100 mg twice daily on a scheduled basis not PRN.  Milk of magnesia 30-60 cc daily ×7 days / May stop sooner for diarrhea.  Increase fiber in diet      Future Appointments        Provider Department Dept Phone Center    3/7/2018 10:15 AM Esme Russell MD [facility] [phone] [facility]      24 Hour Appointment Hotline       To make an appointment at any [facility], call [phone] ([phone]). If you don't have a family doctor or clinic, we will help you find one. [facility] are conveniently located to serve the needs of you and your family.             Review of your medicines      Our records show that you are taking the medicines listed below. If these are incorrect, please call your family doctor or clinic.        Dose / Directions Last dose taken    docusate sodium 100 MG capsule   Commonly known as:  COLACE        Take by mouth 2 times daily as needed for constipation   Refills:  0        OLANZapine 2.5 MG tablet   Commonly known as:  zyPREXA   Dose:  1.25 mg   Quantity:  30 tablet        Take 0.5 tablets (1.25 mg) by mouth every 4 hours as needed   Refills:  0        * QUEtiapine 25 MG tablet   Commonly known as:  SEROQUEL   Quantity:  60 tablet        25mg (1  tab) at bedtime   Refills:  3        * QUEtiapine 25 MG tablet   Commonly known as:  SEROquel   Dose:  6.25 mg   Quantity:  60 tablet        Take 0.25 tablets (6.25 mg) by mouth 2 times daily (before meals)   Refills:  0        senna-docusate 8.6-50 MG per tablet   Commonly known as:  SENOKOT-S;PERICOLACE   Dose:  1 tablet   Quantity:  100 tablet        Take 1 tablet by mouth 2 times daily   Refills:  0        sertraline 25 MG tablet   Commonly known as:  ZOLOFT   Dose:  12.5 mg   Quantity:  30 tablet        Take 0.5 tablets (12.5 mg) by mouth At Bedtime   Refills:  0        TYLENOL 325 MG tablet   Dose:  650 mg   Generic drug:  acetaminophen        Take 650 mg by mouth 4 times daily as needed for mild pain   Refills:  0        * Notice:  This list has 2 medication(s) that are the same as other medications prescribed for you. Read the directions carefully, and ask your doctor or other care provider to review them with you.            Procedures and tests performed during your visit     Tap water enema      Orders Needing Specimen Collection     None      Pending Results     No orders found from 11/15/2017 to 11/18/2017.            Pending Culture Results     No orders found from 11/15/2017 to 11/18/2017.            Pending Results Instructions     If you had any lab results that were not finalized at the time of your Discharge, you can call the ED Lab Result RN at [phone]. You will be contacted by this team for any positive Lab results or changes in treatment. The nurses are available 7 days a week from 10A to 6:30P.  You can leave a message 24 hours per day and they will return your call.        Test Results From Your Hospital Stay               Thank you for choosing [facility]       Thank you for choosing [facility] for your care. Our goal is always to provide you with excellent care. Hearing back from our patients is one way we can continue to improve our services. Please take a few minutes to complete the  "written survey that you may receive in the mail after you visit with us. Thank you!        [employer] Information     [employer] lets you send messages to your doctor, view your test results, renew your prescriptions, schedule appointments and more. To sign up, go to [email or web address] . Click on \"Log in\" on the left side of the screen, which will take you to the Welcome page. Then click on \"Sign up Now\" on the right side of the page.     You will be asked to enter the access code listed below, as well as some personal information. Please follow the directions to create your username and password.     Your access code is: [email or web address]  Expires: 2/15/2018  2:13 AM     Your access code will  in 90 days. If you need help or a new code, please call your [place] clinic or [phone].        Care EveryWhere ID     This is your Care EveryWhere ID. This could be used by other organizations to access your [place] medical records  [id number]        Equal Access to Services     DARRYL GUERRA : Hadkatlyn tomlinson Solluvia, waaxda luqadaha, qaybta kaalmastephanie quiñonez, ayaan copeland. So [facility] [phone].    ATENCIÓN: Si habla español, tiene a mcclendon disposición servicios gratuitos de asistencia lingüística. Llame al [phone].    We comply with applicable federal civil rights laws and [place] laws. We do not discriminate on the basis of race, color, national origin, age, disability, sex, sexual orientation, or gender identity.            After Visit Summary       This is your record. Keep this with you and show to your community pharmacist(s) and doctor(s) at your next visit.                  "

## 2017-11-17 NOTE — ED NOTES
Pt resting with lights down near nursing station.  Perineal care completed.  Pt repositioned and warm blanket given.

## 2017-11-17 NOTE — ED PROVIDER NOTES
History     Chief Complaint   Patient presents with     Pain     Pt having rectal pain tonight while sitting on toilet attempting BM.  Last BM unknown.      HPI  Judith Sifuentes is a 57 year old female who presents per EMS from a local group home.  She was having rectal pain we'll try a bowel movement.  Staff does not know how long its been since her last BM.  History of depression with major neurocognitive disorder.      Problem List:    Patient Active Problem List    Diagnosis Date Noted     Delirium 07/07/2017     Priority: Medium     Dementia without behavioral disturbance, unspecified dementia type 05/24/2016     Priority: Medium     Advanced directives, counseling/discussion 05/12/2014     Priority: Medium     Patient was given to fill out and return to [facility]       Prediabetes 12/18/2013     Priority: Medium     A1C      6.2   7/17/2012  A1C      5.7   2/1/2012  A1C      6.0   10/25/2011  A1C      6.1   5/18/2011         Hyperlipidemia LDL goal <130 12/18/2013     Priority: Medium     Hair loss 12/18/2013     Priority: Medium        Past Medical History:    Past Medical History:   Diagnosis Date     Dementia      Major neurocognitive disorder      Spinal puncture headache 5/24/2016       Past Surgical History:    No past surgical history on file.    Family History:    Family History   Problem Relation Age of Onset     DIABETES Mother      Hypertension Mother      DIABETES Father      Hypertension Maternal Grandmother      DIABETES Maternal Grandmother      Thyroid Disease Daughter        Social History:  Marital Status:   [2]  Social History   Substance Use Topics     Smoking status: Never Smoker     Smokeless tobacco: Never Used     Alcohol use Yes      Comment: rare        Medications:      OLANZapine (ZYPREXA) 2.5 MG tablet   acetaminophen (TYLENOL) 325 MG tablet   docusate sodium (COLACE) 100 MG capsule   sertraline (ZOLOFT) 25 MG tablet   QUEtiapine (SEROQUEL) 25 MG tablet   QUEtiapine  (SEROQUEL) 25 MG tablet   senna-docusate (SENOKOT-S;PERICOLACE) 8.6-50 MG per tablet         Review of Systems  All pertinent positives and negatives are documented in the HPI.  All others reviewed and are negative .    Physical Exam   BP: 142/64  Heart Rate: 67  Temp: 97.9  F (36.6  C)  Weight: 44.9 kg (99 lb)  SpO2: 98 %      Physical Exam  Vital signs reviewed  Alert to person  Abdomen is without distention.  No pain during palpation  Rectal exam confirmed stool impaction  Partial digital disimpaction completed  ED Course     ED Course     Procedures                       Assessments & Plan (with Medical Decision Making)  57-year-old female with dementia presents from local group home with rectal pain during attempts to have a bowel movement.  Examination identifies no concerns for obstruction the patient does have stool rectal impaction.  Partially digitally disimpacted.  Proceeding with tapwater enema.  Uncertain as to be effective with patient's inability to retain fluid.  Medications that she takes are constipating.    2:10 AM  Some stool output   Inserted Dulcolax suppository   Discharge back to residence .  With use of medications such as Zyprexa and Seroquel constipation will continue being a problem .  Recommend starting Colace on a regular basis not PRN basis.  Add milk of magnesia 60 cc daily for 7 days      I have reviewed the nursing notes.    I have reviewed the findings, diagnosis, plan and need for follow up with the patient.      New Prescriptions    No medications on file       Final diagnoses:   Dementia without behavioral disturbance, unspecified dementia type   Slow transit constipation   Impacted stool in rectum (H)       11/17/2017   [facility] EMERGENCY DEPARTMENT     Shakeel Samano,   11/17/17 0212

## 2017-11-17 NOTE — ED AVS SNAPSHOT
[facility]    [address] [address]    Phone:  [phone]    Fax:  [phone]                                       Judith Sifuentes   MRN: [id number]    Department:  [facility] Emergency Department   Date of Visit:  11/17/2017           After Visit Summary Signature Page     I have received my discharge instructions, and my questions have been answered. I have discussed any challenges I see with this plan with the nurse or doctor.    ..........................................................................................................................................  Patient/Patient Representative Signature      ..........................................................................................................................................  Patient Representative Print Name and Relationship to Patient    ..................................................               ................................................  Date                                            Time    ..........................................................................................................................................  Reviewed by Signature/Title    ...................................................              ..............................................  Date                                                            Time

## 2017-11-17 NOTE — PROGRESS NOTES
Clinic Care Coordination Contact    Situation: Patient chart reviewed by care coordinator.    Background: SW received ED notification for pt who was seen in the ED due to having rectal pain tonight while sitting on toilet attempting BM.  Pt currently resides in an Assisted Living Center, [facility] in [place], [phone].  Pt resides in the [facility] Care side of the building, which has 24/7 staff.    Assessment: Pt is appropriately housed at this time, as family was unable to provide cares for her at home any longer.    Plan/Recommendations: SW closed pt to clinic care coordination previously, as pt's , Jon, has not returned any phone calls or responded to letters sent to him.  However, this writer will remain available as need.    Genesis Gibbons  Social Work Care Coordinator  [facility]  [phone]

## 2018-01-02 ENCOUNTER — RECORDS - HEALTHEAST (OUTPATIENT)
Dept: LAB | Facility: CLINIC | Age: 58
End: 2018-01-02

## 2018-01-02 LAB
ALBUMIN SERPL-MCNC: 3.2 G/DL (ref 3.5–5)
ALP SERPL-CCNC: 69 U/L (ref 45–120)
ALT SERPL W P-5'-P-CCNC: 14 U/L (ref 0–45)
ANION GAP SERPL CALCULATED.3IONS-SCNC: 6 MMOL/L (ref 5–18)
AST SERPL W P-5'-P-CCNC: 22 U/L (ref 0–40)
BILIRUB DIRECT SERPL-MCNC: 0.1 MG/DL
BILIRUB SERPL-MCNC: 0.4 MG/DL (ref 0–1)
BUN SERPL-MCNC: 16 MG/DL (ref 8–22)
CALCIUM SERPL-MCNC: 9.2 MG/DL (ref 8.5–10.5)
CHLORIDE BLD-SCNC: 101 MMOL/L (ref 98–107)
CO2 SERPL-SCNC: 34 MMOL/L (ref 22–31)
CREAT SERPL-MCNC: 0.75 MG/DL (ref 0.6–1.1)
ERYTHROCYTE [DISTWIDTH] IN BLOOD BY AUTOMATED COUNT: 16.7 % (ref 11–14.5)
GFR SERPL CREATININE-BSD FRML MDRD: >60 ML/MIN/1.73M2
GLUCOSE BLD-MCNC: 66 MG/DL (ref 70–125)
HCT VFR BLD AUTO: 36.9 % (ref 35–47)
HGB BLD-MCNC: 12.1 G/DL (ref 12–16)
MCH RBC QN AUTO: 32.2 PG (ref 27–34)
MCHC RBC AUTO-ENTMCNC: 32.8 G/DL (ref 32–36)
MCV RBC AUTO: 98 FL (ref 80–100)
PLATELET # BLD AUTO: 82 THOU/UL (ref 140–440)
PMV BLD AUTO: 10.7 FL (ref 8.5–12.5)
POTASSIUM BLD-SCNC: 4.2 MMOL/L (ref 3.5–5)
PROT SERPL-MCNC: 6.5 G/DL (ref 6–8)
RBC # BLD AUTO: 3.76 MILL/UL (ref 3.8–5.4)
SODIUM SERPL-SCNC: 141 MMOL/L (ref 136–145)
VALPROATE SERPL-MCNC: 78.8 UG/ML (ref 50–150)
WBC: 4 THOU/UL (ref 4–11)

## 2018-02-07 ENCOUNTER — TELEPHONE (OUTPATIENT)
Dept: NEUROLOGY | Facility: CLINIC | Age: 58
End: 2018-02-07

## 2018-02-07 NOTE — TELEPHONE ENCOUNTER
Reason for Call:  Form, our goal is to have forms completed with 72 hours, however, some forms may require a visit or additional information.    Type of letter, form or note:  legal    Who is the form from?: Patient    Where did the form come from: Patient or family brought in       What clinic location was the form placed at?: [facility] (ENT, Neurology, Rheumatology, Surgery, Urology, Vascular Surgery)    Where the form was placed: Given to MA/RN    What number is listed as a contact on the form?: [phone]       Additional comments: please complete form and call pt  when done    Call taken on 2/7/2018 at 3:53 PM by Olga Ivan

## 2018-03-07 ENCOUNTER — OFFICE VISIT (OUTPATIENT)
Dept: NEUROLOGY | Facility: CLINIC | Age: 58
End: 2018-03-07
Payer: COMMERCIAL

## 2018-03-07 VITALS
TEMPERATURE: 97.5 F | BODY MASS INDEX: 20.14 KG/M2 | DIASTOLIC BLOOD PRESSURE: 69 MMHG | HEART RATE: 67 BPM | RESPIRATION RATE: 16 BRPM | SYSTOLIC BLOOD PRESSURE: 112 MMHG | WEIGHT: 106.6 LBS

## 2018-03-07 DIAGNOSIS — F02.818 DEMENTIA OF THE ALZHEIMER'S TYPE WITH EARLY ONSET WITH BEHAVIORAL DISTURBANCE (H): Primary | ICD-10-CM

## 2018-03-07 DIAGNOSIS — G30.0 DEMENTIA OF THE ALZHEIMER'S TYPE WITH EARLY ONSET WITH BEHAVIORAL DISTURBANCE (H): Primary | ICD-10-CM

## 2018-03-07 PROCEDURE — 99214 OFFICE O/P EST MOD 30 MIN: CPT | Performed by: PSYCHIATRY & NEUROLOGY

## 2018-03-07 RX ORDER — GABAPENTIN 100 MG/1
200 CAPSULE ORAL 4 TIMES DAILY
COMMUNITY

## 2018-03-07 RX ORDER — MIRTAZAPINE 15 MG/1
15 TABLET, FILM COATED ORAL AT BEDTIME
COMMUNITY

## 2018-03-07 RX ORDER — TRAZODONE HYDROCHLORIDE 50 MG/1
50 TABLET, FILM COATED ORAL
COMMUNITY

## 2018-03-07 RX ORDER — GABAPENTIN 250 MG/5ML
50 SOLUTION ORAL EVERY 4 HOURS PRN
COMMUNITY
End: 2018-10-16

## 2018-03-07 RX ORDER — DULOXETIN HYDROCHLORIDE 60 MG/1
60 CAPSULE, DELAYED RELEASE ORAL EVERY EVENING
COMMUNITY

## 2018-03-07 RX ORDER — DIVALPROEX SODIUM 125 MG/1
250 TABLET, DELAYED RELEASE ORAL 3 TIMES DAILY
COMMUNITY

## 2018-03-07 NOTE — PATIENT INSTRUCTIONS
Plan:    No change in medications. If you can send the records from [place] so we can update Ofelia's plan, that would be helpful.   I agree with your plan for physical therapy.  Handicap form filled out to run through 6/2020.   Return to clinic in 6 months.

## 2018-03-07 NOTE — LETTER
3/7/2018         RE: Judith Sifuentes  [address]  [place] [address]        Dear Colleague,    Thank you for referring your patient, Judith Sifuentes, to the [facility]. Please see a copy of my visit note below.    ESTABLISHED PATIENT NEUROLOGY NOTE    DATE OF VISIT: 3/7/2018  MRN: [id number]  PATIENT NAME: Judith Sifuentes  YOB: 1960    Chief Complaint   Patient presents with     RECHECK     Dementia      SUBJECTIVE:                                                      HISTORY OF PRESENT ILLNESS:  Judith is here for follow up regarding rapidly-progressive dementia. She has had some recent problems with aggression and hallucinations, felt to be part of her dementia. She has been hospitalized for this and had at least one ED visit since her last neurology visit. The patient lives in a Memory Care facility ([facility]). The patient's current psychiatric medication regimen has changed due to outside hospital-made changes. I am not prescribing any medications for Judith at this time.     I was informed recently be our  that she no longer follows Judith's case due to lack of response from .     The patient is accompanied by her , son and daughter in clinic today. Behaviorally she has been doing well since November. She was seen in [facility] most recently (10.2017). She has since been doing well, more even moods. No ups and downs. They all agree about this.     Falls have been more frequent lately, in part they think because she tried to get up too quickly. Because of this, the  says they have physical therapy coming this week. Plan is for 2 sessions weekly, to work on the falls.     Otherwise no new concerns.      Appetite is good. Sleep is good.She seems to sleep well at night but does also nap some during the day. The patient does needs help at times, cueing bathroom use.     Ofelia herself says she is doing  fine. Denies headaches. Answers yes and then no regarding pain. She cannot say why she falls. No complaints.      is requesting handicap sticker, for when he takes Judith places. She has trouble walking lately and of course there is the fall concern.     CURRENT MEDICATIONS:     Current Outpatient Prescriptions on File Prior to Visit:  acetaminophen (TYLENOL) 325 MG tablet Take 650 mg by mouth 4 times daily as needed for mild pain   docusate sodium (COLACE) 100 MG capsule Take by mouth 2 times daily as needed for constipation   senna-docusate (SENOKOT-S;PERICOLACE) 8.6-50 MG per tablet Take 1 tablet by mouth 2 times daily     No current facility-administered medications on file prior to visit.     RECENT DIAGNOSTIC STUDIES:   Results for orders placed or performed during the hospital encounter of 07/07/17   Glucose by meter   Result Value Ref Range    Glucose 121 (H) 70 - 99 mg/dL   Glucose by meter   Result Value Ref Range    Glucose 114 (H) 70 - 99 mg/dL   Basic metabolic panel   Result Value Ref Range    Sodium 143 133 - 144 mmol/L    Potassium 3.7 3.4 - 5.3 mmol/L    Chloride 109 94 - 109 mmol/L    Carbon Dioxide 24 20 - 32 mmol/L    Anion Gap 10 3 - 14 mmol/L    Glucose 198 (H) 70 - 99 mg/dL    Urea Nitrogen 24 7 - 30 mg/dL    Creatinine 0.73 0.52 - 1.04 mg/dL    GFR Estimate 81 >60 mL/min/1.7m2    GFR Estimate If Black >90   GFR Calc   >60 mL/min/1.7m2    Calcium 8.8 8.5 - 10.1 mg/dL   UA with Microscopic   Result Value Ref Range    Color Urine Light Yellow     Appearance Urine Clear     Glucose Urine Negative NEG mg/dL    Bilirubin Urine Negative NEG    Ketones Urine Negative NEG mg/dL    Specific Gravity Urine 1.013 1.003 - 1.035    Blood Urine Negative NEG    pH Urine 5.0 5.0 - 7.0 pH    Protein Albumin Urine Negative NEG mg/dL    Urobilinogen mg/dL Normal 0.0 - 2.0 mg/dL    Nitrite Urine Negative NEG    Leukocyte Esterase Urine Negative NEG    Source Midstream Urine     WBC Urine 1  0 - 2 /HPF    RBC Urine 0 0 - 2 /HPF    Squamous Epithelial /HPF Urine <1 0 - 1 /HPF   Basic metabolic panel   Result Value Ref Range    Sodium 144 133 - 144 mmol/L    Potassium 4.0 3.4 - 5.3 mmol/L    Chloride 108 94 - 109 mmol/L    Carbon Dioxide 25 20 - 32 mmol/L    Anion Gap 11 3 - 14 mmol/L    Glucose 104 (H) 70 - 99 mg/dL    Urea Nitrogen 24 7 - 30 mg/dL    Creatinine 0.75 0.52 - 1.04 mg/dL    GFR Estimate 79 >60 mL/min/1.7m2    GFR Estimate If Black >90   GFR Calc   >60 mL/min/1.7m2    Calcium 9.4 8.5 - 10.1 mg/dL   Psychiatry IP Consult: dementia, agitated behaviors, depressed.; Consultant may enter orders: Yes; Patient to be seen: Routine; Call back #: [phone]    Tony Melendez MD     7/12/2017  3:00 PM  Psychiatry    Chart reviewed, patient interviewed, discussed with Dr. Mcneil.    Impression: Early onset rapidly progressive Alzheimer's Disease   with agitation and mood symptoms. Concerns about syncope and   falls.    Recommendations:  1.  Consider replacing Seroquel with low dose Zyprexa if   orthostatics and falls are a consideration. Would try 1.25 mg qhs   and q 4-6 hrs prn to start and adjust as indicated.  2.  Zoloft may be helpful for depression and irritability.    Consider starting at 12.5 mg to 25 mg hs and titrate by 12.5 to   25mg per day q 2-3days to an initial dose of 50 mg and monitor.    Can go up if needed.  3.  Dictation to follow, call with questions.    Tony Saldana MD  [phone]   Urine Culture Aerobic Bacterial   Result Value Ref Range    Specimen Description Midstream Urine     Special Requests Specimen received in preservative     Culture Micro <10,000 colonies/mL urogenital katlyn     Micro Report Status FINAL 07/12/2017        REVIEW OF SYSTEMS:                                                      Patient unable to complete full 10-point review of systems due to cognitive impairment.    EXAM:                                                       Physical Exam:   Vitals: /69 (BP Location: Left arm, Patient Position: Chair, Cuff Size: Adult Regular)  Pulse 67  Temp 97.5  F (36.4  C) (Tympanic)  Resp 16  Wt 48.4 kg (106 lb 9.6 oz)  LMP 09/18/2013  BMI 20.14 kg/m2 Weight is stable.   BMI= Body mass index is 20.14 kg/(m^2).  GENERAL: Bright affect.   Focused Neurologic:  MENTAL STATUS: Alert, somewhat attentive. Speech is fluent. Fair comprehension with cues. Knows husbands name. Knows son, not daughter. Cannot name month/date.   CRANIAL NERVES: Pupils equally, round and reactive to light. Facial movement normal. EOM full.   MOTOR: Good . Bulk is thin. Tone is unreliable - patient unable to relax on cue.   SENSATION: Unreliable. Light touch appears to be intact.   COORDINATION: Movements are mildly ataxic. She has trouble staying upright; Tends to lean backward. Slightly improved from previous exam.    STATION AND GAIT: Romberg deferred for safety. Gait is wide, cautious.   CV: RRR. S1, S2.     ASSESSMENT and PLAN:                                                      Assessment and Plan:    ICD-10-CM    1. Dementia of the Alzheimer's type with early onset with behavioral disturbance G30.0     F02.81         MsVilma Sifuentes is a pleasant 56 yo woman with advanced early-onset dementia and behavioral disturbance seen in neurology for follow-up. From a cognitive and behavioral standpoint, she seems to be stable on her current medications. We received an updated list from her living facility, so we are in the process of updating the chart. I have also requested records from her mental health stay in [place]. At this point, all of her medications are being managed through her providers at the care facility. We will plan for follow-up in 6 months, or sooner if new concerns arise.     Addendum: Patient is now on Nuedexta, Depakote, Cymbalta, Neurontin, Risperidone, Trazodone and Remeron.     Patient Instructions:  No change in medications. If you  can send the records from [place] so we can update Ofelia's plan, that would be helpful.   I agree with your plan for physical therapy.  Handicap form filled out to run through 6/2020.   Return to clinic in 6 months.     Total Time: 25 minutes were spent with the patient. More than 50% of the time spent on counseling (as described above in Assessment and Plan) /coordinating the care.    Esme Russell MD  Neurology                    Again, thank you for allowing me to participate in the care of your patient.        Sincerely,        Esme Russell MD

## 2018-03-07 NOTE — PROGRESS NOTES
ESTABLISHED PATIENT NEUROLOGY NOTE    DATE OF VISIT: 3/7/2018  MRN: [id number]  PATIENT NAME: Judith Sifuentes  YOB: 1960    Chief Complaint   Patient presents with     RECHECK     Dementia      SUBJECTIVE:                                                      HISTORY OF PRESENT ILLNESS:  Judith is here for follow up regarding rapidly-progressive dementia. She has had some recent problems with aggression and hallucinations, felt to be part of her dementia. She has been hospitalized for this and had at least one ED visit since her last neurology visit. The patient lives in a Memory Care facility ([place]). The patient's current psychiatric medication regimen has changed due to outside hospital-made changes. I am not prescribing any medications for Judith at this time.     I was informed recently be our  that she no longer follows Judith's case due to lack of response from .     The patient is accompanied by her , son and daughter in clinic today. Behaviorally she has been doing well since November. She was seen in [facility] most recently (10.2017). She has since been doing well, more even moods. No ups and downs. They all agree about this.     Falls have been more frequent lately, in part they think because she tried to get up too quickly. Because of this, the  says they have physical therapy coming this week. Plan is for 2 sessions weekly, to work on the falls.     Otherwise no new concerns.      Appetite is good. Sleep is good.She seems to sleep well at night but does also nap some during the day. The patient does needs help at times, cueing bathroom use.     Ofelia herself says she is doing fine. Denies headaches. Answers yes and then no regarding pain. She cannot say why she falls. No complaints.      is requesting handicap sticker, for when he takes Judith places. She has trouble walking lately and of course there is the fall concern.      CURRENT MEDICATIONS:     Current Outpatient Prescriptions on File Prior to Visit:  acetaminophen (TYLENOL) 325 MG tablet Take 650 mg by mouth 4 times daily as needed for mild pain   docusate sodium (COLACE) 100 MG capsule Take by mouth 2 times daily as needed for constipation   senna-docusate (SENOKOT-S;PERICOLACE) 8.6-50 MG per tablet Take 1 tablet by mouth 2 times daily     No current facility-administered medications on file prior to visit.     RECENT DIAGNOSTIC STUDIES:   Results for orders placed or performed during the hospital encounter of 07/07/17   Glucose by meter   Result Value Ref Range    Glucose 121 (H) 70 - 99 mg/dL   Glucose by meter   Result Value Ref Range    Glucose 114 (H) 70 - 99 mg/dL   Basic metabolic panel   Result Value Ref Range    Sodium 143 133 - 144 mmol/L    Potassium 3.7 3.4 - 5.3 mmol/L    Chloride 109 94 - 109 mmol/L    Carbon Dioxide 24 20 - 32 mmol/L    Anion Gap 10 3 - 14 mmol/L    Glucose 198 (H) 70 - 99 mg/dL    Urea Nitrogen 24 7 - 30 mg/dL    Creatinine 0.73 0.52 - 1.04 mg/dL    GFR Estimate 81 >60 mL/min/1.7m2    GFR Estimate If Black >90   GFR Calc   >60 mL/min/1.7m2    Calcium 8.8 8.5 - 10.1 mg/dL   UA with Microscopic   Result Value Ref Range    Color Urine Light Yellow     Appearance Urine Clear     Glucose Urine Negative NEG mg/dL    Bilirubin Urine Negative NEG    Ketones Urine Negative NEG mg/dL    Specific Gravity Urine 1.013 1.003 - 1.035    Blood Urine Negative NEG    pH Urine 5.0 5.0 - 7.0 pH    Protein Albumin Urine Negative NEG mg/dL    Urobilinogen mg/dL Normal 0.0 - 2.0 mg/dL    Nitrite Urine Negative NEG    Leukocyte Esterase Urine Negative NEG    Source Midstream Urine     WBC Urine 1 0 - 2 /HPF    RBC Urine 0 0 - 2 /HPF    Squamous Epithelial /HPF Urine <1 0 - 1 /HPF   Basic metabolic panel   Result Value Ref Range    Sodium 144 133 - 144 mmol/L    Potassium 4.0 3.4 - 5.3 mmol/L    Chloride 108 94 - 109 mmol/L    Carbon Dioxide 25 20 - 32  mmol/L    Anion Gap 11 3 - 14 mmol/L    Glucose 104 (H) 70 - 99 mg/dL    Urea Nitrogen 24 7 - 30 mg/dL    Creatinine 0.75 0.52 - 1.04 mg/dL    GFR Estimate 79 >60 mL/min/1.7m2    GFR Estimate If Black >90   GFR Calc   >60 mL/min/1.7m2    Calcium 9.4 8.5 - 10.1 mg/dL   Psychiatry IP Consult: dementia, agitated behaviors, depressed.; Consultant may enter orders: Yes; Patient to be seen: Routine; Call back #: [phone]    Tony Melendez MD     7/12/2017  3:00 PM  Psychiatry    Chart reviewed, patient interviewed, discussed with Dr. Mcneil.    Impression: Early onset rapidly progressive Alzheimer's Disease   with agitation and mood symptoms. Concerns about syncope and   falls.    Recommendations:  1.  Consider replacing Seroquel with low dose Zyprexa if   orthostatics and falls are a consideration. Would try 1.25 mg qhs   and q 4-6 hrs prn to start and adjust as indicated.  2.  Zoloft may be helpful for depression and irritability.    Consider starting at 12.5 mg to 25 mg hs and titrate by 12.5 to   25mg per day q 2-3days to an initial dose of 50 mg and monitor.    Can go up if needed.  3.  Dictation to follow, call with questions.    Tony Saldana MD  [phone]   Urine Culture Aerobic Bacterial   Result Value Ref Range    Specimen Description Midstream Urine     Special Requests Specimen received in preservative     Culture Micro <10,000 colonies/mL urogenital katlyn     Micro Report Status FINAL 07/12/2017        REVIEW OF SYSTEMS:                                                      Patient unable to complete full 10-point review of systems due to cognitive impairment.    EXAM:                                                      Physical Exam:   Vitals: /69 (BP Location: Left arm, Patient Position: Chair, Cuff Size: Adult Regular)  Pulse 67  Temp 97.5  F (36.4  C) (Tympanic)  Resp 16  Wt 48.4 kg (106 lb 9.6 oz)  LMP 09/18/2013  BMI 20.14 kg/m2 Weight is stable.    BMI= Body mass index is 20.14 kg/(m^2).  GENERAL: Bright affect.   Focused Neurologic:  MENTAL STATUS: Alert, somewhat attentive. Speech is fluent. Fair comprehension with cues. Knows husbands name. Knows son, not daughter. Cannot name month/date.   CRANIAL NERVES: Pupils equally, round and reactive to light. Facial movement normal. EOM full.   MOTOR: Good . Bulk is thin. Tone is unreliable - patient unable to relax on cue.   SENSATION: Unreliable. Light touch appears to be intact.   COORDINATION: Movements are mildly ataxic. She has trouble staying upright; Tends to lean backward. Slightly improved from previous exam.    STATION AND GAIT: Romberg deferred for safety. Gait is wide, cautious.   CV: RRR. S1, S2.     ASSESSMENT and PLAN:                                                      Assessment and Plan:    ICD-10-CM    1. Dementia of the Alzheimer's type with early onset with behavioral disturbance G30.0     F02.81         Ms. Denys Sifuentes is a pleasant 56 yo woman with advanced early-onset dementia and behavioral disturbance seen in neurology for follow-up. From a cognitive and behavioral standpoint, she seems to be stable on her current medications. We received an updated list from her living facility, so we are in the process of updating the chart. I have also requested records from her mental health stay in [place]. At this point, all of her medications are being managed through her providers at the care facility. We will plan for follow-up in 6 months, or sooner if new concerns arise.     Addendum: Patient is now on Nuedexta, Depakote, Cymbalta, Neurontin, Risperidone, Trazodone and Remeron.     Patient Instructions:  No change in medications. If you can send the records from [place] so we can update Ofelia's plan, that would be helpful.   I agree with your plan for physical therapy.  Handicap form filled out to run through 6/2020.   Return to clinic in 6 months.     Total Time: 25 minutes were spent  with the patient. More than 50% of the time spent on counseling (as described above in Assessment and Plan) /coordinating the care.    Esme Russell MD  Neurology

## 2018-03-07 NOTE — NURSING NOTE
"Chief Complaint   Patient presents with     RECHECK     Dementia        Initial /69 (BP Location: Left arm, Patient Position: Chair, Cuff Size: Adult Regular)  Pulse 67  Temp 97.5  F (36.4  C) (Tympanic)  Resp 16  Wt 48.4 kg (106 lb 9.6 oz)  LMP 09/18/2013  BMI 20.14 kg/m2 Estimated body mass index is 20.14 kg/(m^2) as calculated from the following:    Height as of 3/10/17: 1.549 m (5' 1\").    Weight as of this encounter: 48.4 kg (106 lb 9.6 oz).  BP completed using cuff size: regular  Medications and allergies reviewed.      Caridad AGUILERA CMA     "

## 2018-03-07 NOTE — MR AVS SNAPSHOT
"              After Visit Summary   3/7/2018    Judith Sifuentes    MRN: [id number]           Patient Information     Date Of Birth          1960        Visit Information        Provider Department      3/7/2018 10:15 AM Esme Russell MD [facility]        Care Instructions    Plan:    No change in medications. If you can send the records from [place] so we can update Ofelia's plan, that would be helpful.   I agree with your plan for physical therapy.  Handicap form filled out to run through 6/2020.   Return to clinic in 6 months.           Follow-ups after your visit        Who to contact     If you have questions or need follow up information about today's clinic visit or your schedule please contact [facility] directly at [phone].  Normal or non-critical lab and imaging results will be communicated to you by MyChart, letter or phone within 4 business days after the clinic has received the results. If you do not hear from us within 7 days, please contact the clinic through MyChart or phone. If you have a critical or abnormal lab result, we will notify you by phone as soon as possible.  Submit refill requests through [email or web address] or call your pharmacy and they will forward the refill request to us. Please allow 3 business days for your refill to be completed.          Additional Information About Your Visit        MyChart Information     [email or web address] lets you send messages to your doctor, view your test results, renew your prescriptions, schedule appointments and more. To sign up, go to [email or web address] . Click on \"Log in\" on the left side of the screen, which will take you to the Welcome page. Then click on \"Sign up Now\" on the right side of the page.     You will be asked to enter the access code listed below, as well as some personal information. Please follow the directions to create your username and password.     Your access code is: [email or web address]  Expires: 6/5/2018 " 10:15 AM     Your access code will  in 90 days. If you need help or a new code, please call your [facility] clinic or [phone].        Care EveryWhere ID     This is your Care EveryWhere ID. This could be used by other organizations to access your [facility] medical records  [id number]        Your Vitals Were     Pulse Temperature Respirations Last Period BMI (Body Mass Index)       67 97.5  F (36.4  C) (Tympanic) 16 2013 20.14 kg/m2        Blood Pressure from Last 3 Encounters:   18 112/69   17 139/88   17 114/52    Weight from Last 3 Encounters:   18 48.4 kg (106 lb 9.6 oz)   17 44.9 kg (99 lb)   17 49.6 kg (109 lb 6.4 oz)              Today, you had the following     No orders found for display         Today's Medication Changes          These changes are accurate as of 3/7/18 10:20 AM.  If you have any questions, ask your nurse or doctor.               These medicines have changed or have updated prescriptions.        Dose/Directions    sertraline 25 MG tablet   Commonly known as:  ZOLOFT   This may have changed:  how much to take   Used for:  Dementia without behavioral disturbance, unspecified dementia type        Dose:  12.5 mg   Take 0.5 tablets (12.5 mg) by mouth At Bedtime   Quantity:  30 tablet   Refills:  0                Primary Care Provider Office Phone # Fax #    Erika Miner DO [facility] [phone] [address] [address]        Equal Access to Services     DARRYL GUERRA AH: Anuja tomlinson Solluvia, waaxda luqadaha, qaybta kaalmada ayaan quiñonez. So [facility] [phone].    ATENCIÓN: Si habla español, tiene a mcclendon disposición servicios gratuitos de asistencia lingüística. Llame al [phone].    We comply with applicable federal civil rights laws and [place] laws. We do not discriminate on the basis of race, color, national origin, age, disability, sex, sexual orientation, or  gender identity.            Thank you!     Thank you for choosing [facility]  for your care. Our goal is always to provide you with excellent care. Hearing back from our patients is one way we can continue to improve our services. Please take a few minutes to complete the written survey that you may receive in the mail after your visit with us. Thank you!             Your Updated Medication List - Protect others around you: Learn how to safely use, store and throw away your medicines at www.disposemymeds.org.          This list is accurate as of 3/7/18 10:20 AM.  Always use your most recent med list.                   Brand Name Dispense Instructions for use Diagnosis    docusate sodium 100 MG capsule    COLACE     Take by mouth 2 times daily as needed for constipation        OLANZapine 2.5 MG tablet    zyPREXA    30 tablet    Take 0.5 tablets (1.25 mg) by mouth every 4 hours as needed        * QUEtiapine 25 MG tablet    SEROQUEL    60 tablet    25mg (1 tab) at bedtime    Early onset Alzheimer's disease with behavioral disturbance       * QUEtiapine 25 MG tablet    SEROquel    60 tablet    Take 0.25 tablets (6.25 mg) by mouth 2 times daily (before meals)    Early onset Alzheimer's disease with behavioral disturbance       senna-docusate 8.6-50 MG per tablet    SENOKOT-S;PERICOLACE    100 tablet    Take 1 tablet by mouth 2 times daily    Slow transit constipation       sertraline 25 MG tablet    ZOLOFT    30 tablet    Take 0.5 tablets (12.5 mg) by mouth At Bedtime    Dementia without behavioral disturbance, unspecified dementia type       TYLENOL 325 MG tablet   Generic drug:  acetaminophen      Take 650 mg by mouth 4 times daily as needed for mild pain        * Notice:  This list has 2 medication(s) that are the same as other medications prescribed for you. Read the directions carefully, and ask your doctor or other care provider to review them with you.

## 2018-04-30 ENCOUNTER — TELEPHONE (OUTPATIENT)
Dept: NEUROLOGY | Facility: CLINIC | Age: 58
End: 2018-04-30

## 2018-04-30 NOTE — TELEPHONE ENCOUNTER
Forms for disability parking need to be filled out and signed , put in front CMA basket 04/30/18 RT

## 2018-05-16 ENCOUNTER — RECORDS - HEALTHEAST (OUTPATIENT)
Dept: LAB | Facility: CLINIC | Age: 58
End: 2018-05-16

## 2018-05-17 LAB
ALBUMIN SERPL-MCNC: 3.4 G/DL (ref 3.5–5)
ALP SERPL-CCNC: 78 U/L (ref 45–120)
ALT SERPL W P-5'-P-CCNC: <9 U/L (ref 0–45)
ANION GAP SERPL CALCULATED.3IONS-SCNC: 10 MMOL/L (ref 5–18)
AST SERPL W P-5'-P-CCNC: 19 U/L (ref 0–40)
BILIRUB SERPL-MCNC: 0.3 MG/DL (ref 0–1)
BUN SERPL-MCNC: 12 MG/DL (ref 8–22)
CALCIUM SERPL-MCNC: 9.3 MG/DL (ref 8.5–10.5)
CHLORIDE BLD-SCNC: 98 MMOL/L (ref 98–107)
CO2 SERPL-SCNC: 29 MMOL/L (ref 22–31)
CREAT SERPL-MCNC: 0.71 MG/DL (ref 0.6–1.1)
ERYTHROCYTE [DISTWIDTH] IN BLOOD BY AUTOMATED COUNT: 12.9 % (ref 11–14.5)
GFR SERPL CREATININE-BSD FRML MDRD: >60 ML/MIN/1.73M2
GLUCOSE BLD-MCNC: 112 MG/DL (ref 70–125)
HCT VFR BLD AUTO: 37.2 % (ref 35–47)
HGB BLD-MCNC: 12.2 G/DL (ref 12–16)
MCH RBC QN AUTO: 34.5 PG (ref 27–34)
MCHC RBC AUTO-ENTMCNC: 32.8 G/DL (ref 32–36)
MCV RBC AUTO: 105 FL (ref 80–100)
PLATELET # BLD AUTO: 152 THOU/UL (ref 140–440)
PMV BLD AUTO: 9.4 FL (ref 8.5–12.5)
POTASSIUM BLD-SCNC: 3.7 MMOL/L (ref 3.5–5)
PROT SERPL-MCNC: 7.1 G/DL (ref 6–8)
RBC # BLD AUTO: 3.54 MILL/UL (ref 3.8–5.4)
SODIUM SERPL-SCNC: 137 MMOL/L (ref 136–145)
WBC: 5.4 THOU/UL (ref 4–11)

## 2018-05-18 LAB
25(OH)D3 SERPL-MCNC: 23.6 NG/ML (ref 30–80)
HBA1C MFR BLD: 5.6 % (ref 4.2–6.1)

## 2018-07-19 ENCOUNTER — RECORDS - HEALTHEAST (OUTPATIENT)
Dept: LAB | Facility: CLINIC | Age: 58
End: 2018-07-19

## 2018-07-19 LAB
ALBUMIN UR-MCNC: NEGATIVE MG/DL
APPEARANCE UR: CLEAR
BACTERIA #/AREA URNS HPF: ABNORMAL HPF
BILIRUB UR QL STRIP: NEGATIVE
COLOR UR AUTO: YELLOW
GLUCOSE UR STRIP-MCNC: NEGATIVE MG/DL
HGB UR QL STRIP: NEGATIVE
KETONES UR STRIP-MCNC: NEGATIVE MG/DL
LEUKOCYTE ESTERASE UR QL STRIP: ABNORMAL
NITRATE UR QL: NEGATIVE
PH UR STRIP: 7 [PH] (ref 4.5–8)
RBC #/AREA URNS AUTO: ABNORMAL HPF
SP GR UR STRIP: 1.02 (ref 1–1.03)
SQUAMOUS #/AREA URNS AUTO: ABNORMAL LPF
UROBILINOGEN UR STRIP-ACNC: ABNORMAL
WBC #/AREA URNS AUTO: ABNORMAL HPF
WBC CLUMPS #/AREA URNS HPF: PRESENT /[HPF]

## 2018-07-21 LAB — BACTERIA SPEC CULT: NO GROWTH

## 2018-09-10 ENCOUNTER — OFFICE VISIT (OUTPATIENT)
Dept: NEUROLOGY | Facility: CLINIC | Age: 58
End: 2018-09-10
Payer: COMMERCIAL

## 2018-09-10 VITALS
WEIGHT: 114.6 LBS | RESPIRATION RATE: 12 BRPM | HEART RATE: 75 BPM | DIASTOLIC BLOOD PRESSURE: 61 MMHG | SYSTOLIC BLOOD PRESSURE: 97 MMHG | TEMPERATURE: 97.6 F | BODY MASS INDEX: 21.65 KG/M2

## 2018-09-10 DIAGNOSIS — R25.3 TWITCHING: ICD-10-CM

## 2018-09-10 DIAGNOSIS — G30.0 DEMENTIA OF THE ALZHEIMER'S TYPE WITH EARLY ONSET WITH BEHAVIORAL DISTURBANCE (H): Primary | ICD-10-CM

## 2018-09-10 DIAGNOSIS — F02.818 DEMENTIA OF THE ALZHEIMER'S TYPE WITH EARLY ONSET WITH BEHAVIORAL DISTURBANCE (H): Primary | ICD-10-CM

## 2018-09-10 PROCEDURE — 99214 OFFICE O/P EST MOD 30 MIN: CPT | Performed by: PSYCHIATRY & NEUROLOGY

## 2018-09-10 RX ORDER — BISACODYL 10 MG
10 SUPPOSITORY, RECTAL RECTAL EVERY 12 HOURS PRN
COMMUNITY

## 2018-09-10 NOTE — PROGRESS NOTES
ESTABLISHED PATIENT NEUROLOGY NOTE    DATE OF VISIT: 9/10/2018  MRN: [id number]  PATIENT NAME: Judith Sifuentes  YOB: 1960    Chief Complaint   Patient presents with     RECHECK     Dementia     SUBJECTIVE:                                                      HISTORY OF PRESENT ILLNESS:  Judith is here for follow up regarding rapidly-progressive dementia. The patient lives at a Memory Care Facility now ([facility]). I am no longer prescribing any medications for Judith, these are managed through providers. Also, our care coordinators had lost touch with the family, after lack of response from Judith's .     When I met with the patient and her family about 6 months ago, they reported that Judith's mood had been more stable lately. I was interested in seeing her notes from her mental health stay in [place], but I do not think we ever received these.     Today the patient is accompanied by her  and son in clinic today.     They tell me that she is doing really well lately. Both the  and son agree that Ofelia's memory and mood issues seem to have stabilized. There are days when she remembers things to a degree that it's like she does not even have dementia, per . Appetite is good.  (Tavon) mentions that Ofelia does get tired during the day. He is told she has been more restful at night (per staff at her living facility). She was singing in the car on the way here. They have no new concerns and feel that Ofelia has all of the resources she needs at this time. Ofelia is on Depakote, Risperidone Duloxetine and Nuedexta for mood. She is not currently on any medications for memory.    She denies pain. Ofelia otherwise provides little input to our conversation today, but answers my direct questions with simple (sometimes non-sensical) answers.     Her primary care provider is Dr. Davey. She has not seen her for a while. Most recent office note I see in the chart is from 3.2017. No  recent hospitalizations.       CURRENT MEDICATIONS:     Current Outpatient Prescriptions on File Prior to Visit:  acetaminophen (TYLENOL) 325 MG tablet Take 650 mg by mouth 4 times daily as needed for mild pain   dextromethorphan-quiNIDine (NUEDEXTA) 20-10 MG per capsule Take 1 capsule by mouth 2 times daily   divalproex sodium delayed-release (DEPAKOTE) 125 MG DR tablet Take 250 mg by mouth 3 times daily   docusate sodium (COLACE) 100 MG capsule Take by mouth 2 times daily as needed for constipation   DULoxetine (CYMBALTA) 60 MG EC capsule Take 60 mg by mouth daily   gabapentin (NEURONTIN) 100 MG capsule Take 200 mg by mouth 4 times daily   mirtazapine (REMERON) 15 MG tablet Take 15 mg by mouth At Bedtime   RISPERIDONE PO Take 0.25 mg by mouth 4 times daily And as needed   traZODone (DESYREL) 50 MG tablet Take 50 mg by mouth nightly as needed for sleep (May repeat 1 tablet before 3am if pt not sleeping)   gabapentin (NEURONTIN) 250 MG/5ML solution Take 50 mg by mouth every 4 hours as needed   senna-docusate (SENOKOT-S;PERICOLACE) 8.6-50 MG per tablet Take 1 tablet by mouth 2 times daily     No current facility-administered medications on file prior to visit.     RECENT DIAGNOSTIC STUDIES:   Labs:   Results for orders placed or performed during the hospital encounter of 07/07/17   Glucose by meter   Result Value Ref Range    Glucose 121 (H) 70 - 99 mg/dL   Glucose by meter   Result Value Ref Range    Glucose 114 (H) 70 - 99 mg/dL   Basic metabolic panel   Result Value Ref Range    Sodium 143 133 - 144 mmol/L    Potassium 3.7 3.4 - 5.3 mmol/L    Chloride 109 94 - 109 mmol/L    Carbon Dioxide 24 20 - 32 mmol/L    Anion Gap 10 3 - 14 mmol/L    Glucose 198 (H) 70 - 99 mg/dL    Urea Nitrogen 24 7 - 30 mg/dL    Creatinine 0.73 0.52 - 1.04 mg/dL    GFR Estimate 81 >60 mL/min/1.7m2    GFR Estimate If Black >90   GFR Calc   >60 mL/min/1.7m2    Calcium 8.8 8.5 - 10.1 mg/dL   UA with Microscopic   Result Value  Ref Range    Color Urine Light Yellow     Appearance Urine Clear     Glucose Urine Negative NEG mg/dL    Bilirubin Urine Negative NEG    Ketones Urine Negative NEG mg/dL    Specific Gravity Urine 1.013 1.003 - 1.035    Blood Urine Negative NEG    pH Urine 5.0 5.0 - 7.0 pH    Protein Albumin Urine Negative NEG mg/dL    Urobilinogen mg/dL Normal 0.0 - 2.0 mg/dL    Nitrite Urine Negative NEG    Leukocyte Esterase Urine Negative NEG    Source Midstream Urine     WBC Urine 1 0 - 2 /HPF    RBC Urine 0 0 - 2 /HPF    Squamous Epithelial /HPF Urine <1 0 - 1 /HPF   Basic metabolic panel   Result Value Ref Range    Sodium 144 133 - 144 mmol/L    Potassium 4.0 3.4 - 5.3 mmol/L    Chloride 108 94 - 109 mmol/L    Carbon Dioxide 25 20 - 32 mmol/L    Anion Gap 11 3 - 14 mmol/L    Glucose 104 (H) 70 - 99 mg/dL    Urea Nitrogen 24 7 - 30 mg/dL    Creatinine 0.75 0.52 - 1.04 mg/dL    GFR Estimate 79 >60 mL/min/1.7m2    GFR Estimate If Black >90   GFR Calc   >60 mL/min/1.7m2    Calcium 9.4 8.5 - 10.1 mg/dL   Psychiatry IP Consult: dementia, agitated behaviors, depressed.; Consultant may enter orders: Yes; Patient to be seen: Routine; Call back #: [phone]    Tony Melendez MD     7/12/2017  3:00 PM  Psychiatry    Chart reviewed, patient interviewed, discussed with Dr. Mcneil.    Impression: Early onset rapidly progressive Alzheimer's Disease   with agitation and mood symptoms. Concerns about syncope and   falls.    Recommendations:  1.  Consider replacing Seroquel with low dose Zyprexa if   orthostatics and falls are a consideration. Would try 1.25 mg qhs   and q 4-6 hrs prn to start and adjust as indicated.  2.  Zoloft may be helpful for depression and irritability.    Consider starting at 12.5 mg to 25 mg hs and titrate by 12.5 to   25mg per day q 2-3days to an initial dose of 50 mg and monitor.    Can go up if needed.  3.  Dictation to follow, call with questions.    Tony Saldana  MD  [phone]   Urine Culture Aerobic Bacterial   Result Value Ref Range    Specimen Description Midstream Urine     Special Requests Specimen received in preservative     Culture Micro <10,000 colonies/mL urogenital katlyn     Micro Report Status FINAL 07/12/2017        REVIEW OF SYSTEMS:                                                      10-point review of systems is negative except as mentioned above in HPI.     EXAM:                                                      Physical Exam:   Vitals: BP 97/61 (BP Location: Right arm, Patient Position: Sitting, Cuff Size: Adult Regular)  Pulse 75  Temp 97.6  F (36.4  C) (Tympanic)  Resp 12  Wt 52 kg (114 lb 9.6 oz)  LMP 09/18/2013  BMI 21.65 kg/m2  BMI= Body mass index is 21.65 kg/(m^2).  GENERAL: Bright affect. Appears much older than stated age.   Focused Neurologic:  MENTAL STATUS: Alert, somewhat attentive. Speech is fluent. Fair comprehension with cues. Knows husbands name. Cannot remember her son's name. Cannot name day, month/date.   CRANIAL NERVES: Pupils equally, round and reactive to light. Facial movement normal, slow. EOM appear full tyhough difficult to test.   MOTOR: Good . Bulk is thin. Paratonia RUE>LUE).    SENSATION: Unreliable. Light touch appears to be intact.   COORDINATION: Brief twitching in arms noted. Fine motor movements are slow.   STATION AND GAIT: Romberg deferred for safety. Gait is wide, cautious.   CV: RRR. S1, S2.     ASSESSMENT and PLAN:                                                      Assessment and Plan:    ICD-10-CM    1. Dementia of the Alzheimer's type with early onset with behavioral disturbance G30.0     F02.81    2. Twitching R25.3         MsVilma Sifuentes is a pleasant 58 yo woman with advanced early-onset dementia here for follow-up. The patient appears to be stable and is in an appropriate living facility with outside support from family as well. The patient and her family deny any concerns today. We  discussed the importance of annual check-ups with Ofelia's primary provider. It seems that most of her care is occurring through the care facility but I asked them to keep Dr. Davey in the loop as well. I do not need to see the patient back unless family has concerns. At this point I have not got anything to offer except supportive measures for the dementia. The family understands and agrees with the plan.     Patient Instructions:  I am so glad to hear and see that Ofelia is stable.   Since she has a primary care provider and a provider at the living facility, I think you can just follow-up in neurology as needed.  Let us know if any new concerns arise.     Total Time: 25 minutes were spent with the patient. More than 50% of the time spent on counseling (as described above in Assessment and Plan/Instructions) /coordinating the care.    Esme Queen MD  Neurology

## 2018-09-10 NOTE — PATIENT INSTRUCTIONS
I am so glad to hear and see that Ofelia is stable.   Since she has a primary care provider and a provider at the living facility, I think you can just follow-up in neurology as needed.  Let us know if any new concerns arise.

## 2018-09-10 NOTE — LETTER
9/10/2018         RE: Judith Sifuentes  [address]  [facility] [place] [address]        Dear Colleague,    Thank you for referring your patient, Judith Sifeuntes, to the [facility]. Please see a copy of my visit note below.    ESTABLISHED PATIENT NEUROLOGY NOTE    DATE OF VISIT: 9/10/2018  MRN: [id number]  PATIENT NAME: Judith Sifuentes  YOB: 1960    Chief Complaint   Patient presents with     RECHECK     Dementia     SUBJECTIVE:                                                      HISTORY OF PRESENT ILLNESS:  Judith is here for follow up regarding rapidly-progressive dementia. The patient lives at a Memory Care Facility now ([facility]). I am no longer prescribing any medications for Judith, these are managed through providers. Also, our care coordinators had lost touch with the family, after lack of response from Judith's .     When I met with the patient and her family about 6 months ago, they reported that Judith's mood had been more stable lately. I was interested in seeing her notes from her mental health stay in [place], but I do not think we ever received these.     Today the patient is accompanied by her  and son in clinic today.     They tell me that she is doing really well lately. Both the  and son agree that Ofelia's memory and mood issues seem to have stabilized. There are days when she remembers things to a degree that it's like she does not even have dementia, per . Appetite is good.  (Tavon) mentions that Ofelia does get tired during the day. He is told she has been more restful at night (per staff at her living facility). She was singing in the car on the way here. They have no new concerns and feel that Ofelia has all of the resources she needs at this time. Ofelia is on Depakote, Risperidone Duloxetine and Nuedexta for mood. She is not currently on any medications for memory.    She denies pain. Ofelia otherwise  provides little input to our conversation today, but answers my direct questions with simple (sometimes non-sensical) answers.     Her primary care provider is Dr. Davey. She has not seen her for a while. Most recent office note I see in the chart is from 3.2017. No recent hospitalizations.       CURRENT MEDICATIONS:     Current Outpatient Prescriptions on File Prior to Visit:  acetaminophen (TYLENOL) 325 MG tablet Take 650 mg by mouth 4 times daily as needed for mild pain   dextromethorphan-quiNIDine (NUEDEXTA) 20-10 MG per capsule Take 1 capsule by mouth 2 times daily   divalproex sodium delayed-release (DEPAKOTE) 125 MG DR tablet Take 250 mg by mouth 3 times daily   docusate sodium (COLACE) 100 MG capsule Take by mouth 2 times daily as needed for constipation   DULoxetine (CYMBALTA) 60 MG EC capsule Take 60 mg by mouth daily   gabapentin (NEURONTIN) 100 MG capsule Take 200 mg by mouth 4 times daily   mirtazapine (REMERON) 15 MG tablet Take 15 mg by mouth At Bedtime   RISPERIDONE PO Take 0.25 mg by mouth 4 times daily And as needed   traZODone (DESYREL) 50 MG tablet Take 50 mg by mouth nightly as needed for sleep (May repeat 1 tablet before 3am if pt not sleeping)   gabapentin (NEURONTIN) 250 MG/5ML solution Take 50 mg by mouth every 4 hours as needed   senna-docusate (SENOKOT-S;PERICOLACE) 8.6-50 MG per tablet Take 1 tablet by mouth 2 times daily     No current facility-administered medications on file prior to visit.     RECENT DIAGNOSTIC STUDIES:   Labs:   Results for orders placed or performed during the hospital encounter of 07/07/17   Glucose by meter   Result Value Ref Range    Glucose 121 (H) 70 - 99 mg/dL   Glucose by meter   Result Value Ref Range    Glucose 114 (H) 70 - 99 mg/dL   Basic metabolic panel   Result Value Ref Range    Sodium 143 133 - 144 mmol/L    Potassium 3.7 3.4 - 5.3 mmol/L    Chloride 109 94 - 109 mmol/L    Carbon Dioxide 24 20 - 32 mmol/L    Anion Gap 10 3 - 14 mmol/L    Glucose 198  (H) 70 - 99 mg/dL    Urea Nitrogen 24 7 - 30 mg/dL    Creatinine 0.73 0.52 - 1.04 mg/dL    GFR Estimate 81 >60 mL/min/1.7m2    GFR Estimate If Black >90   GFR Calc   >60 mL/min/1.7m2    Calcium 8.8 8.5 - 10.1 mg/dL   UA with Microscopic   Result Value Ref Range    Color Urine Light Yellow     Appearance Urine Clear     Glucose Urine Negative NEG mg/dL    Bilirubin Urine Negative NEG    Ketones Urine Negative NEG mg/dL    Specific Gravity Urine 1.013 1.003 - 1.035    Blood Urine Negative NEG    pH Urine 5.0 5.0 - 7.0 pH    Protein Albumin Urine Negative NEG mg/dL    Urobilinogen mg/dL Normal 0.0 - 2.0 mg/dL    Nitrite Urine Negative NEG    Leukocyte Esterase Urine Negative NEG    Source Midstream Urine     WBC Urine 1 0 - 2 /HPF    RBC Urine 0 0 - 2 /HPF    Squamous Epithelial /HPF Urine <1 0 - 1 /HPF   Basic metabolic panel   Result Value Ref Range    Sodium 144 133 - 144 mmol/L    Potassium 4.0 3.4 - 5.3 mmol/L    Chloride 108 94 - 109 mmol/L    Carbon Dioxide 25 20 - 32 mmol/L    Anion Gap 11 3 - 14 mmol/L    Glucose 104 (H) 70 - 99 mg/dL    Urea Nitrogen 24 7 - 30 mg/dL    Creatinine 0.75 0.52 - 1.04 mg/dL    GFR Estimate 79 >60 mL/min/1.7m2    GFR Estimate If Black >90   GFR Calc   >60 mL/min/1.7m2    Calcium 9.4 8.5 - 10.1 mg/dL   Psychiatry IP Consult: dementia, agitated behaviors, depressed.; Consultant may enter orders: Yes; Patient to be seen: Routine; Call back #: [phone]    [facility], Tony DERAS MD     7/12/2017  3:00 PM  Psychiatry    Chart reviewed, patient interviewed, discussed with Dr. Mcneil.    Impression: Early onset rapidly progressive Alzheimer's Disease   with agitation and mood symptoms. Concerns about syncope and   falls.    Recommendations:  1.  Consider replacing Seroquel with low dose Zyprexa if   orthostatics and falls are a consideration. Would try 1.25 mg qhs   and q 4-6 hrs prn to start and adjust as indicated.  2.  Zoloft may be  helpful for depression and irritability.    Consider starting at 12.5 mg to 25 mg hs and titrate by 12.5 to   25mg per day q 2-3days to an initial dose of 50 mg and monitor.    Can go up if needed.  3.  Dictation to follow, call with questions.    Tony Saldana MD  [phone]   Urine Culture Aerobic Bacterial   Result Value Ref Range    Specimen Description Midstream Urine     Special Requests Specimen received in preservative     Culture Micro <10,000 colonies/mL urogenital katlyn     Micro Report Status FINAL 07/12/2017        REVIEW OF SYSTEMS:                                                      10-point review of systems is negative except as mentioned above in HPI.     EXAM:                                                      Physical Exam:   Vitals: BP 97/61 (BP Location: Right arm, Patient Position: Sitting, Cuff Size: Adult Regular)  Pulse 75  Temp 97.6  F (36.4  C) (Tympanic)  Resp 12  Wt 52 kg (114 lb 9.6 oz)  LMP 09/18/2013  BMI 21.65 kg/m2  BMI= Body mass index is 21.65 kg/(m^2).  GENERAL: Bright affect. Appears much older than stated age.   Focused Neurologic:  MENTAL STATUS: Alert, somewhat attentive. Speech is fluent. Fair comprehension with cues. Knows husbands name. Cannot remember her son's name. Cannot name day, month/date.   CRANIAL NERVES: Pupils equally, round and reactive to light. Facial movement normal, slow. EOM appear full tyhough difficult to test.   MOTOR: Good . Bulk is thin. Paratonia RUE>LUE).    SENSATION: Unreliable. Light touch appears to be intact.   COORDINATION: Brief twitching in arms noted. Fine motor movements are slow.   STATION AND GAIT: Romberg deferred for safety. Gait is wide, cautious.   CV: RRR. S1, S2.     ASSESSMENT and PLAN:                                                      Assessment and Plan:    ICD-10-CM    1. Dementia of the Alzheimer's type with early onset with behavioral disturbance G30.0     F02.81    2. Twitching R25.3         Ms. Still  Bear is a pleasant 56 yo woman with advanced early-onset dementia here for follow-up. The patient appears to be stable and is in an appropriate living facility with outside support from family as well. The patient and her family deny any concerns today. We discussed the importance of annual check-ups with Ofelia's primary provider. It seems that most of her care is occurring through the care facility but I asked them to keep Dr. Davey in the loop as well. I do not need to see the patient back unless family has concerns. At this point I have not got anything to offer except supportive measures for the dementia. The family understands and agrees with the plan.     Patient Instructions:  I am so glad to hear and see that Ofelia is stable.   Since she has a primary care provider and a provider at the living facility, I think you can just follow-up in neurology as needed.  Let us know if any new concerns arise.     Total Time: 25 minutes were spent with the patient. More than 50% of the time spent on counseling (as described above in Assessment and Plan/Instructions) /coordinating the care.    Esme Queen MD  Neurology                  Again, thank you for allowing me to participate in the care of your patient.        Sincerely,        Esme Queen MD

## 2018-09-10 NOTE — MR AVS SNAPSHOT
After Visit Summary   9/10/2018    Judith Sifuentes    MRN: [id number]           Patient Information     Date Of Birth          1960        Visit Information        Provider Department      9/10/2018 10:15 AM Esme Ann MD [facility]        Care Instructions    I am so glad to hear and see that Ofelia is stable.   Since she has a primary care provider and a provider at the living facility, I think you can just follow-up in neurology as needed.  Let us know if any new concerns arise.           Follow-ups after your visit        Who to contact     If you have questions or need follow up information about today's clinic visit or your schedule please contact [facility] directly at [phone].  Normal or non-critical lab and imaging results will be communicated to you by MyChart, letter or phone within 4 business days after the clinic has received the results. If you do not hear from us within 7 days, please contact the clinic through MyChart or phone. If you have a critical or abnormal lab result, we will notify you by phone as soon as possible.  Submit refill requests through [employer] or call your pharmacy and they will forward the refill request to us. Please allow 3 business days for your refill to be completed.          Additional Information About Your Visit        Care EveryWhere ID     This is your Care EveryWhere ID. This could be used by other organizations to access your [place] medical records  [id number]        Your Vitals Were     Pulse Temperature Respirations Last Period BMI (Body Mass Index)       75 97.6  F (36.4  C) (Tympanic) 12 09/18/2013 21.65 kg/m2        Blood Pressure from Last 3 Encounters:   09/10/18 97/61   03/07/18 112/69   11/17/17 139/88    Weight from Last 3 Encounters:   09/10/18 52 kg (114 lb 9.6 oz)   03/07/18 48.4 kg (106 lb 9.6 oz)   11/17/17 44.9 kg (99 lb)              Today, you had the following     No  orders found for display       Primary Care Provider Office Phone # Fax #    Erika Davey [phone] [address] [address]        Equal Access to Services     DARRYL GUERRA : Hadii aad ku hadjanetteo Soaracelisali, waaxda luqadaha, qaybta kaalmada adeegyada, ayaan siennain hayaayari carpenterjuana kaplanladan copeland. So [facility] [phone].    ATENCIÓN: Si habla español, tiene a mcclendon disposición servicios gratuitos de asistencia lingüística. Llame al [phone].    We comply with applicable federal civil rights laws and [place] laws. We do not discriminate on the basis of race, color, national origin, age, disability, sex, sexual orientation, or gender identity.            Thank you!     Thank you for choosing [facility]  for your care. Our goal is always to provide you with excellent care. Hearing back from our patients is one way we can continue to improve our services. Please take a few minutes to complete the written survey that you may receive in the mail after your visit with us. Thank you!             Your Updated Medication List - Protect others around you: Learn how to safely use, store and throw away your medicines at www.disposemymeds.org.          This list is accurate as of 9/10/18 10:44 AM.  Always use your most recent med list.                   Brand Name Dispense Instructions for use Diagnosis    bisacodyl 10 MG Suppository    DULCOLAX     Place 10 mg rectally every 12 hours as needed for constipation        divalproex sodium delayed-release 125 MG DR tablet    DEPAKOTE     Take 250 mg by mouth 3 times daily        docusate sodium 100 MG capsule    COLACE     Take by mouth 2 times daily as needed for constipation        DULoxetine 60 MG EC capsule    CYMBALTA     Take 60 mg by mouth daily        * gabapentin 100 MG capsule    NEURONTIN     Take 200 mg by mouth 4 times daily        * gabapentin 250 MG/5ML solution    NEURONTIN     Take 50 mg by mouth every 4 hours as  needed        MILK OF MAGNESIA 400 MG/5ML suspension   Generic drug:  magnesium hydroxide      Take 30 mLs by mouth daily as needed for constipation or heartburn        mirtazapine 15 MG tablet    REMERON     Take 15 mg by mouth At Bedtime        NUEDEXTA 20-10 MG per capsule   Generic drug:  dextromethorphan-quiNIDine      Take 1 capsule by mouth 2 times daily        RISPERIDONE PO      Take 0.25 mg by mouth 4 times daily And as needed        senna-docusate 8.6-50 MG per tablet    SENOKOT-S;PERICOLACE    100 tablet    Take 1 tablet by mouth 2 times daily    Slow transit constipation       traZODone 50 MG tablet    DESYREL     Take 50 mg by mouth nightly as needed for sleep (May repeat 1 tablet before 3am if pt not sleeping)        TYLENOL 325 MG tablet   Generic drug:  acetaminophen      Take 650 mg by mouth 4 times daily as needed for mild pain        VITAMIN D (CHOLECALCIFEROL) PO      Take by mouth daily        * Notice:  This list has 2 medication(s) that are the same as other medications prescribed for you. Read the directions carefully, and ask your doctor or other care provider to review them with you.

## 2018-09-15 ENCOUNTER — RECORDS - HEALTHEAST (OUTPATIENT)
Dept: LAB | Facility: CLINIC | Age: 58
End: 2018-09-15

## 2018-09-15 LAB
ALBUMIN UR-MCNC: NEGATIVE MG/DL
APPEARANCE UR: CLEAR
BILIRUB UR QL STRIP: NEGATIVE
COLOR UR AUTO: NORMAL
GLUCOSE UR STRIP-MCNC: NEGATIVE MG/DL
HGB UR QL STRIP: NEGATIVE
KETONES UR STRIP-MCNC: NEGATIVE MG/DL
LEUKOCYTE ESTERASE UR QL STRIP: NEGATIVE
NITRATE UR QL: NEGATIVE
PH UR STRIP: 8 [PH] (ref 4.5–8)
SP GR UR STRIP: 1.01 (ref 1–1.03)
UROBILINOGEN UR STRIP-ACNC: NORMAL

## 2018-09-16 LAB — BACTERIA SPEC CULT: NO GROWTH

## 2018-10-16 ENCOUNTER — HOSPITAL ENCOUNTER (EMERGENCY)
Facility: CLINIC | Age: 58
Discharge: HOME | End: 2018-10-16
Attending: EMERGENCY MEDICINE | Admitting: EMERGENCY MEDICINE
Payer: COMMERCIAL

## 2018-10-16 ENCOUNTER — MEDICAL CORRESPONDENCE (OUTPATIENT)
Dept: HEALTH INFORMATION MANAGEMENT | Facility: CLINIC | Age: 58
End: 2018-10-16

## 2018-10-16 VITALS
RESPIRATION RATE: 16 BRPM | OXYGEN SATURATION: 99 % | HEART RATE: 77 BPM | DIASTOLIC BLOOD PRESSURE: 83 MMHG | TEMPERATURE: 97.4 F | SYSTOLIC BLOOD PRESSURE: 133 MMHG | HEIGHT: 62 IN | WEIGHT: 113 LBS | BODY MASS INDEX: 20.8 KG/M2

## 2018-10-16 DIAGNOSIS — L03.012 PARONYCHIA OF LEFT RING FINGER: ICD-10-CM

## 2018-10-16 DIAGNOSIS — S09.90XA CLOSED HEAD INJURY, INITIAL ENCOUNTER: ICD-10-CM
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
## 2018-10-16 PROCEDURE — 99283 EMERGENCY DEPT VISIT LOW MDM: CPT

## 2018-10-16 PROCEDURE — 99283 EMERGENCY DEPT VISIT LOW MDM: CPT | Mod: Z6 | Performed by: EMERGENCY MEDICINE

## 2018-10-16 RX ORDER — GINSENG 100 MG
CAPSULE ORAL DAILY
COMMUNITY

## 2018-10-16 RX ORDER — RISPERIDONE 0.5 MG/1
0.5 TABLET ORAL 2 TIMES DAILY
COMMUNITY

## 2018-10-16 RX ORDER — RISPERIDONE 0.25 MG/1
0.25 TABLET ORAL EVERY 4 HOURS PRN
COMMUNITY

## 2018-10-16 NOTE — ED AVS SNAPSHOT
[facility] Emergency Department    [address] [address]    Phone:  [phone]    Fax:  [phone]                                       Judith Sifuentes   MRN: [id number]    Department:  [facility] Emergency Department   Date of Visit:  10/16/2018           After Visit Summary Signature Page     I have received my discharge instructions, and my questions have been answered. I have discussed any challenges I see with this plan with the nurse or doctor.    ..........................................................................................................................................  Patient/Patient Representative Signature      ..........................................................................................................................................  Patient Representative Print Name and Relationship to Patient    ..................................................               ................................................  Date                                   Time    ..........................................................................................................................................  Reviewed by Signature/Title    ...................................................              ..............................................  Date                                               Time          22EPIC Rev 08/18

## 2018-10-16 NOTE — ED NOTES
Pt tripped and fell, hit head, no LOC, Pt has advanced dimentia, reporting no pain,  would like her checked and for her L fingers to be examined due to redness at finger tips, concern for infection.

## 2018-10-16 NOTE — ED AVS SNAPSHOT
[facility] Emergency Department    [address] [address]    Phone:  [phone]    Fax:  [phone]                                       Judith Sifuentes   MRN: [id number]    Department:  [facility] Emergency Department   Date of Visit:  10/16/2018           Patient Information     Date Of Birth          1960        Your diagnoses for this visit were:     Fall, initial encounter     Closed head injury, initial encounter     Paronychia of left ring finger        You were seen by Victor Hugo Youngblood MD.        Discharge Instructions       Soak the left finger in warm water for 10-15 minutes at a time several times per day.  Continue taking the antibiotics that have been prescribed.  Return to the emergency department if you have rapidly spreading rash, increased pain, fevers, repeated vomiting, or other concerns.  Otherwise follow-up in clinic.    24 Hour Appointment Hotline       To make an appointment at any [facility], call [phone] ([phone]). If you don't have a family doctor or clinic, we will help you find one. [place] clinics are conveniently located to serve the needs of you and your family.             Review of your medicines      Our records show that you are taking the medicines listed below. If these are incorrect, please call your family doctor or clinic.        Dose / Directions Last dose taken    bisacodyl 10 MG Suppository   Commonly known as:  DULCOLAX   Dose:  10 mg        Place 10 mg rectally every 12 hours as needed for constipation   Refills:  0        divalproex sodium delayed-release 125 MG DR tablet   Commonly known as:  DEPAKOTE   Dose:  250 mg        Take 250 mg by mouth 3 times daily   Refills:  0        docusate sodium 100 MG capsule   Commonly known as:  COLACE        Take by mouth 2 times daily as needed for constipation   Refills:  0        DULoxetine 60 MG EC capsule   Commonly known as:  CYMBALTA   Dose:  60 mg         Take 60 mg by mouth daily   Refills:  0        * gabapentin 100 MG capsule   Commonly known as:  NEURONTIN   Dose:  200 mg        Take 200 mg by mouth 4 times daily   Refills:  0        * gabapentin 250 MG/5ML solution   Commonly known as:  NEURONTIN   Dose:  50 mg        Take 50 mg by mouth every 4 hours as needed   Refills:  0        MILK OF MAGNESIA 400 MG/5ML suspension   Dose:  30 mL   Generic drug:  magnesium hydroxide        Take 30 mLs by mouth daily as needed for constipation or heartburn   Refills:  0        mirtazapine 15 MG tablet   Commonly known as:  REMERON   Dose:  15 mg        Take 15 mg by mouth At Bedtime   Refills:  0        NUEDEXTA 20-10 MG per capsule   Dose:  1 capsule   Generic drug:  dextromethorphan-quiNIDine        Take 1 capsule by mouth 2 times daily   Refills:  0        RISPERIDONE PO   Dose:  0.25 mg        Take 0.25 mg by mouth 4 times daily And as needed   Refills:  0        senna-docusate 8.6-50 MG per tablet   Commonly known as:  SENOKOT-S;PERICOLACE   Dose:  1 tablet   Quantity:  100 tablet        Take 1 tablet by mouth 2 times daily   Refills:  0        traZODone 50 MG tablet   Commonly known as:  DESYREL   Dose:  50 mg        Take 50 mg by mouth nightly as needed for sleep (May repeat 1 tablet before 3am if pt not sleeping)   Refills:  0        TYLENOL 325 MG tablet   Dose:  650 mg   Generic drug:  acetaminophen        Take 650 mg by mouth 4 times daily as needed for mild pain   Refills:  0        VITAMIN D (CHOLECALCIFEROL) PO        Take by mouth daily   Refills:  0        * Notice:  This list has 2 medication(s) that are the same as other medications prescribed for you. Read the directions carefully, and ask your doctor or other care provider to review them with you.            Orders Needing Specimen Collection     None      Pending Results     No orders found from 10/14/2018 to 10/17/2018.            Pending Culture Results     No orders found from 10/14/2018 to  10/17/2018.            Pending Results Instructions     If you had any lab results that were not finalized at the time of your Discharge, you can call the ED Lab Result RN at [phone]. You will be contacted by this team for any positive Lab results or changes in treatment. The nurses are available 7 days a week from 10A to 6:30P.  You can leave a message 24 hours per day and they will return your call.        Test Results From Your Hospital Stay               Thank you for choosing [facility]       Thank you for choosing [facility] for your care. Our goal is always to provide you with excellent care. Hearing back from our patients is one way we can continue to improve our services. Please take a few minutes to complete the written survey that you may receive in the mail after you visit with us. Thank you!        Care EveryWhere ID     This is your Care EveryWhere ID. This could be used by other organizations to access your [place] [place] medical records  [id number]        Equal Access to Services     DARRYL GUERRA : Anuja Gillis, laine veloz, ayaan matos . So [facility] [phone].    ATENCIÓN: Si habla español, tiene a mcclendon disposición servicios gratuitos de asistencia lingüística. Llame al [phone].    We comply with applicable federal civil rights laws and [place] laws. We do not discriminate on the basis of race, color, national origin, age, disability, sex, sexual orientation, or gender identity.            After Visit Summary       This is your record. Keep this with you and show to your community pharmacist(s) and doctor(s) at your next visit.

## 2018-10-16 NOTE — ED PROVIDER NOTES
History     Chief Complaint   Patient presents with     Fall     Patient lives at nursing home and has severe dementia - fell this morning and broke her glasses - patient is nonverbal in triage and will stare off - this is normal per her  who drove her in. NH would like eval of fingers as well as they appear to be infected left ring finger and pinkey.     HPI  Judith Sifuentes is a 57 year old female who presents for evaluation of left finger swelling as well as a fall.  The patient lives in a nursing home and has severe dementia, history obtained through the patient, nursing home records, and the patient's .  About 2 hours prior to arrival the patient fell and hit the left forehead.  No loss of consciousness.  No vomiting.  She is acting her norm per the .  She is not on any blood thinners.  Also has had left ring finger swelling and pain over the past several days.  It started draining pus and she was started on doxycycline for this today.  She denies any headache, nausea, vomiting, difficulty breathing, or pain anywhere.    Problem List:    Patient Active Problem List    Diagnosis Date Noted     Delirium 07/07/2017     Priority: Medium     Dementia without behavioral disturbance, unspecified dementia type 05/24/2016     Priority: Medium     Advanced directives, counseling/discussion 05/12/2014     Priority: Medium     Patient was given to fill out and return to [facility]       Prediabetes 12/18/2013     Priority: Medium     A1C      6.2   7/17/2012  A1C      5.7   2/1/2012  A1C      6.0   10/25/2011  A1C      6.1   5/18/2011         Hyperlipidemia LDL goal <130 12/18/2013     Priority: Medium     Hair loss 12/18/2013     Priority: Medium        Past Medical History:    Past Medical History:   Diagnosis Date     Dementia      Major neurocognitive disorder      Spinal puncture headache 5/24/2016       Past Surgical History:    No past surgical history on file.    Family History:    Family  "History   Problem Relation Age of Onset     Diabetes Mother      Hypertension Mother      Diabetes Father      Hypertension Maternal Grandmother      Diabetes Maternal Grandmother      Thyroid Disease Daughter        Social History:  Marital Status:   [2]  Social History   Substance Use Topics     Smoking status: Never Smoker     Smokeless tobacco: Never Used     Alcohol use Yes      Comment: rare        Medications:      acetaminophen (TYLENOL) 325 MG tablet   bisacodyl (DULCOLAX) 10 MG Suppository   dextromethorphan-quiNIDine (NUEDEXTA) 20-10 MG per capsule   divalproex sodium delayed-release (DEPAKOTE) 125 MG DR tablet   docusate sodium (COLACE) 100 MG capsule   DULoxetine (CYMBALTA) 60 MG EC capsule   gabapentin (NEURONTIN) 100 MG capsule   gabapentin (NEURONTIN) 250 MG/5ML solution   magnesium hydroxide (MILK OF MAGNESIA) 400 MG/5ML suspension   mirtazapine (REMERON) 15 MG tablet   RISPERIDONE PO   senna-docusate (SENOKOT-S;PERICOLACE) 8.6-50 MG per tablet   traZODone (DESYREL) 50 MG tablet   VITAMIN D, CHOLECALCIFEROL, PO         Review of Systems  Pertinent positives and negatives listed in the HPI, all other systems reviewed and are negative.    Physical Exam   BP: 133/83  Pulse: 77  Temp: 97.4  F (36.3  C)  Resp: 16  Height: 157.5 cm (5' 2\")  Weight: 51.3 kg (113 lb)  SpO2: 99 %      Physical Exam  /83  Pulse 77  Temp 97.4  F (36.3  C) (Temporal)  Resp 16  Ht 1.575 m (5' 2\")  Wt 51.3 kg (113 lb)  LMP 09/18/2013  SpO2 99%  BMI 20.67 kg/m2   GENERAL: Alert, cooperative, no distress  HEAD: No scalp laceration, hematoma, or abrasion.  No tenderness.  EYES: Conjunctivae clear, EOM intact. PERLL, Pupils are 3 mm.  HEENT:  Normal external ears, normal TM's without evidence of hemotympanum.  No nasal discharge or evidence of septal hematoma. No facial instability or asymmetry. No evidence of intraoral trauma.  Intact bite without malalignment.  NECK: No midline tenderness, no lateral " tenderness.  CHEST:  No crepitus or tenderness with AP or lateral compression  CARDIOVASCULAR : Nml rate, distal pulses are normal and symmetric  LUNGS: No respiratory distress.  GI: Soft, ND, NT.   PELVIS: No crepitus or tenderness with AP or lateral compression  BACK: No step-offs palpated, no tenderness to palpation of thoracic or lumbar spine.  EXTREMITIES: No obvious deformities, no tenderness to palpation. Left ring finger has erythema and dried pus along the fingernail bed along the ulnar aspect  NEUROLOGICAL : Awake, alert, and oriented x 1.  Cranial nerves II-XII grossly intact. Moving all extremities.   SKIN : Normal color, no jaundice or rash. No abrasions.      ED Course     ED Course     Procedures               Critical Care time:  none               No results found for this or any previous visit (from the past 24 hour(s)).    Medications - No data to display    Assessments & Plan (with Medical Decision Making)   57-year-old female presents for evaluation of left finger redness and swelling as well as evaluation after a fall.  Left ring finger has some dried pus around the fingernail, I applied some pressure and remove the dried pus but was unable to expel any further pus.  It is likely paronychia, no signs of felon, no signs of spreading cellulitis.  The nursing home was given instructions to soak this in warm water several times per day, continue the antibiotics as have been ordered, return if worse.  Concerning the patient's fall, this was a mild ground-level fall without signs of trauma here.  She is acting at her baseline per the  a bite there is very limited evaluation of her mental status given her severe dementia.  I discussed goals of care with the , he says that the patient would like to be comfort cares only.  Per her wishes when she was less severely demented she did not want any aggressive resuscitation as her dementia worsened.  Considering this and with the low risk injury  and no signs of trauma here and her lack of complaints, I do not believe that further investigation is warranted.  No indication for CT of the head or neck at this time and she is safe to discharge with instructions to return if she has worsening symptoms or other concerns, otherwise follow-up in clinic.  The patient's  is in agreement with this plan.    I have reviewed the nursing notes.    I have reviewed the findings, diagnosis, plan and need for follow up with the patient.       New Prescriptions    No medications on file       Final diagnoses:   Fall, initial encounter   Closed head injury, initial encounter   Paronychia of left ring finger       10/16/2018   [facility] EMERGENCY DEPARTMENT     Victor Hugo Youngblood MD  10/16/18 0216

## 2018-10-16 NOTE — DISCHARGE INSTRUCTIONS
Soak the left finger in warm water for 10-15 minutes at a time several times per day.  Continue taking the antibiotics that have been prescribed.  Return to the emergency department if you have rapidly spreading rash, increased pain, fevers, repeated vomiting, or other concerns.  Otherwise follow-up in clinic.
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
## 2020-10-29 NOTE — ED NOTES
Report given to EMS team.  Pt had large BM prior to leaving with EMS team.  Pt states relief.  D/c paperwork given to EMS team.    Safe sheath inserted over the wire.

## 2020-11-16 NOTE — PLAN OF CARE
Problem: Goal Outcome Summary  Goal: Goal Outcome Summary  Outcome: No Change  VS:  VSS, Pt slept well, 1:1  NST reported Pt called out while sleeping 2-3 times but fell back to sleep.    Output:  Voiding spontaneously    Activity:  SBA  ambulates with gait belt    Pain:  denies   CMS:  Intact   Dressing:  none   Diet:  Tolerating regular diet, requires setup   LDA:  IV patent and SL   Equipment:  Gait belt   Plan:  Possible placement to memory care. Continue plan of care.                  Closure 3 Information: This tab is for additional flaps and grafts above and beyond our usual structured repairs.  Please note if you enter information here it will not currently bill and you will need to add the billing information manually.

## 2021-01-27 NOTE — PROGRESS NOTES
SPIRITUAL HEALTH SERVICES  SPIRITUAL ASSESSMENT Progress Note  [facility] 8A    REFERRAL SOURCE:  Initiated    Pt, Judith, was very confused during the visit. While we were talking, a worried look came over her and she called out to Mal who is her son. I asked about this and she came back to reality and said she was wondering when he was going to visit. Her face lightened up and she smiled when she spoke of her .     PLAN: Will follow while on unit    Krystin Samano   Intern  Pager [phone]   Face Mask

## 2024-12-23 NOTE — TELEPHONE ENCOUNTER
Optimize BG readings without insulin stacking or hypoglycemia   See above.   On Farxiga and Lisinopril    Pt  called back left  stating pt is doing good and is back at the nursing home now.   This is an FYI only    Olga Ivan  Specialty CSS